# Patient Record
Sex: MALE | Race: WHITE | Employment: OTHER | URBAN - METROPOLITAN AREA
[De-identification: names, ages, dates, MRNs, and addresses within clinical notes are randomized per-mention and may not be internally consistent; named-entity substitution may affect disease eponyms.]

---

## 2017-01-05 ENCOUNTER — TELEPHONE (OUTPATIENT)
Dept: INTERNAL MEDICINE CLINIC | Age: 67
End: 2017-01-05

## 2017-01-30 ENCOUNTER — TELEPHONE (OUTPATIENT)
Dept: INTERNAL MEDICINE CLINIC | Age: 67
End: 2017-01-30

## 2017-01-30 DIAGNOSIS — M31.6 TEMPORAL ARTERITIS (HCC): Primary | ICD-10-CM

## 2017-02-07 ENCOUNTER — TELEPHONE (OUTPATIENT)
Dept: INTERNAL MEDICINE CLINIC | Age: 67
End: 2017-02-07

## 2017-02-07 RX ORDER — PREDNISONE 1 MG/1
TABLET ORAL
Qty: 60 TABLET | Refills: 3 | Status: SHIPPED | OUTPATIENT
Start: 2017-02-07 | End: 2017-03-23 | Stop reason: SDUPTHER

## 2017-02-08 RX ORDER — METHOCARBAMOL 750 MG/1
750 TABLET, FILM COATED ORAL 2 TIMES DAILY PRN
Qty: 30 TABLET | Refills: 1 | Status: SHIPPED | OUTPATIENT
Start: 2017-02-08 | End: 2017-02-18

## 2017-03-02 ENCOUNTER — OFFICE VISIT (OUTPATIENT)
Dept: INTERNAL MEDICINE CLINIC | Age: 67
End: 2017-03-02

## 2017-03-02 VITALS — HEART RATE: 70 BPM | SYSTOLIC BLOOD PRESSURE: 110 MMHG | DIASTOLIC BLOOD PRESSURE: 70 MMHG

## 2017-03-02 DIAGNOSIS — K21.9 GASTROESOPHAGEAL REFLUX DISEASE WITHOUT ESOPHAGITIS: ICD-10-CM

## 2017-03-02 DIAGNOSIS — R73.02 GLUCOSE INTOLERANCE (IMPAIRED GLUCOSE TOLERANCE): ICD-10-CM

## 2017-03-02 DIAGNOSIS — M31.6 TEMPORAL ARTERITIS (HCC): Primary | ICD-10-CM

## 2017-03-02 PROCEDURE — 99213 OFFICE O/P EST LOW 20 MIN: CPT | Performed by: INTERNAL MEDICINE

## 2017-03-02 RX ORDER — IPRATROPIUM BROMIDE 21 UG/1
2 SPRAY, METERED NASAL 2 TIMES DAILY
Qty: 3 BOTTLE | Refills: 3 | Status: SHIPPED | OUTPATIENT
Start: 2017-03-02 | End: 2019-11-14 | Stop reason: SDUPTHER

## 2017-03-20 LAB — SEDIMENTATION RATE, ERYTHROCYTE: 8 MM/HR (ref 0–20)

## 2017-03-23 RX ORDER — PREDNISONE 1 MG/1
TABLET ORAL
Qty: 60 TABLET | Refills: 3
Start: 2017-03-23 | End: 2017-09-25

## 2017-04-03 LAB — SEDIMENTATION RATE, ERYTHROCYTE: 9 MM/HR (ref 0–20)

## 2017-04-06 ENCOUNTER — TELEPHONE (OUTPATIENT)
Dept: INTERNAL MEDICINE CLINIC | Age: 67
End: 2017-04-06

## 2017-04-26 LAB — SEDIMENTATION RATE, ERYTHROCYTE: 13 MM/HR (ref 0–20)

## 2017-05-16 LAB — SEDIMENTATION RATE, ERYTHROCYTE: 10 MM/HR (ref 0–20)

## 2017-06-15 RX ORDER — CYCLOBENZAPRINE HCL 10 MG
TABLET ORAL
Qty: 30 TABLET | Refills: 5 | Status: SHIPPED | OUTPATIENT
Start: 2017-06-15 | End: 2017-09-25

## 2017-09-25 ENCOUNTER — OFFICE VISIT (OUTPATIENT)
Dept: INTERNAL MEDICINE CLINIC | Age: 67
End: 2017-09-25

## 2017-09-25 ENCOUNTER — HOSPITAL ENCOUNTER (OUTPATIENT)
Dept: GENERAL RADIOLOGY | Age: 67
Discharge: HOME | End: 2017-09-25
Attending: INTERNAL MEDICINE | Admitting: INTERNAL MEDICINE

## 2017-09-25 VITALS
SYSTOLIC BLOOD PRESSURE: 130 MMHG | HEART RATE: 76 BPM | HEIGHT: 67 IN | BODY MASS INDEX: 33.09 KG/M2 | DIASTOLIC BLOOD PRESSURE: 70 MMHG | WEIGHT: 210.8 LBS

## 2017-09-25 DIAGNOSIS — M31.6 TEMPORAL ARTERITIS (HCC): Primary | ICD-10-CM

## 2017-09-25 DIAGNOSIS — D50.9 IRON DEFICIENCY ANEMIA, UNSPECIFIED IRON DEFICIENCY ANEMIA TYPE: ICD-10-CM

## 2017-09-25 DIAGNOSIS — G89.29 CHRONIC RIGHT SHOULDER PAIN: ICD-10-CM

## 2017-09-25 DIAGNOSIS — M25.511 CHRONIC RIGHT SHOULDER PAIN: ICD-10-CM

## 2017-09-25 DIAGNOSIS — D12.6 ADENOMATOUS POLYP OF COLON, UNSPECIFIED PART OF COLON: ICD-10-CM

## 2017-09-25 DIAGNOSIS — R73.02 GLUCOSE INTOLERANCE (IMPAIRED GLUCOSE TOLERANCE): ICD-10-CM

## 2017-09-25 DIAGNOSIS — E03.4 HYPOTHYROIDISM DUE TO ACQUIRED ATROPHY OF THYROID: ICD-10-CM

## 2017-09-25 DIAGNOSIS — R00.2 PALPITATIONS: ICD-10-CM

## 2017-09-25 DIAGNOSIS — E55.9 VITAMIN D DEFICIENCY: ICD-10-CM

## 2017-09-25 DIAGNOSIS — E78.2 MIXED HYPERLIPIDEMIA: ICD-10-CM

## 2017-09-25 DIAGNOSIS — M48.061 LUMBAR SPINAL STENOSIS: ICD-10-CM

## 2017-09-25 DIAGNOSIS — K21.9 GASTROESOPHAGEAL REFLUX DISEASE WITHOUT ESOPHAGITIS: ICD-10-CM

## 2017-09-25 DIAGNOSIS — Z23 NEED FOR VACCINATION FOR PNEUMOCOCCUS: ICD-10-CM

## 2017-09-25 DIAGNOSIS — C61 PROSTATE CANCER (HCC): ICD-10-CM

## 2017-09-25 LAB
A/G RATIO: 1.2 (ref 1.1–2.2)
ALBUMIN SERPL-MCNC: 4 G/DL (ref 3.4–5)
ALP BLD-CCNC: 100 U/L (ref 40–129)
ALT SERPL-CCNC: 6 U/L (ref 10–40)
ANION GAP SERPL CALCULATED.3IONS-SCNC: 10 MMOL/L (ref 3–16)
AST SERPL-CCNC: 14 U/L (ref 15–37)
BASOPHILS ABSOLUTE: 0.1 K/UL (ref 0–0.2)
BASOPHILS RELATIVE PERCENT: 1.1 %
BILIRUB SERPL-MCNC: 0.5 MG/DL (ref 0–1)
BILIRUBIN URINE: NEGATIVE
BLOOD, URINE: NEGATIVE
BUN BLDV-MCNC: 15 MG/DL (ref 7–20)
CALCIUM SERPL-MCNC: 9.7 MG/DL (ref 8.3–10.6)
CHLORIDE BLD-SCNC: 103 MMOL/L (ref 99–110)
CHOLESTEROL, TOTAL: 150 MG/DL (ref 0–199)
CLARITY: CLEAR
CO2: 29 MMOL/L (ref 21–32)
COLOR: YELLOW
CREAT SERPL-MCNC: 1 MG/DL (ref 0.8–1.3)
EOSINOPHILS ABSOLUTE: 0.4 K/UL (ref 0–0.6)
EOSINOPHILS RELATIVE PERCENT: 5.7 %
EPITHELIAL CELLS, UA: 0 /HPF (ref 0–5)
GFR AFRICAN AMERICAN: >60
GFR NON-AFRICAN AMERICAN: >60
GLOBULIN: 3.3 G/DL
GLUCOSE BLD-MCNC: 88 MG/DL (ref 70–99)
GLUCOSE URINE: NEGATIVE MG/DL
HCT VFR BLD CALC: 40 % (ref 40.5–52.5)
HDLC SERPL-MCNC: 43 MG/DL (ref 40–60)
HEMOGLOBIN: 13.5 G/DL (ref 13.5–17.5)
HYALINE CASTS: 1 /LPF (ref 0–8)
KETONES, URINE: NEGATIVE MG/DL
LDL CHOLESTEROL CALCULATED: 90 MG/DL
LEUKOCYTE ESTERASE, URINE: NEGATIVE
LYMPHOCYTES ABSOLUTE: 1.8 K/UL (ref 1–5.1)
LYMPHOCYTES RELATIVE PERCENT: 26.2 %
MCH RBC QN AUTO: 30.5 PG (ref 26–34)
MCHC RBC AUTO-ENTMCNC: 33.8 G/DL (ref 31–36)
MCV RBC AUTO: 90.4 FL (ref 80–100)
MICROSCOPIC EXAMINATION: NORMAL
MONOCYTES ABSOLUTE: 0.6 K/UL (ref 0–1.3)
MONOCYTES RELATIVE PERCENT: 8.7 %
NEUTROPHILS ABSOLUTE: 3.9 K/UL (ref 1.7–7.7)
NEUTROPHILS RELATIVE PERCENT: 58.3 %
NITRITE, URINE: NEGATIVE
PDW BLD-RTO: 13.1 % (ref 12.4–15.4)
PH UA: 5.5
PLATELET # BLD: 222 K/UL (ref 135–450)
PMV BLD AUTO: 8.2 FL (ref 5–10.5)
POTASSIUM SERPL-SCNC: 4.3 MMOL/L (ref 3.5–5.1)
PROSTATE SPECIFIC ANTIGEN: 0.01 NG/ML (ref 0–4)
PROTEIN UA: NEGATIVE MG/DL
RBC # BLD: 4.43 M/UL (ref 4.2–5.9)
RBC UA: 2 /HPF (ref 0–4)
SEDIMENTATION RATE, ERYTHROCYTE: 39 MM/HR (ref 0–20)
SODIUM BLD-SCNC: 142 MMOL/L (ref 136–145)
SPECIFIC GRAVITY UA: 1.02
TOTAL PROTEIN: 7.3 G/DL (ref 6.4–8.2)
TRIGL SERPL-MCNC: 83 MG/DL (ref 0–150)
TSH SERPL DL<=0.05 MIU/L-ACNC: 1.71 UIU/ML (ref 0.27–4.2)
UROBILINOGEN, URINE: 0.2 E.U./DL
VITAMIN D 25-HYDROXY: 39.1 NG/ML
VLDLC SERPL CALC-MCNC: 17 MG/DL
WBC # BLD: 6.7 K/UL (ref 4–11)
WBC UA: 0 /HPF (ref 0–5)

## 2017-09-25 PROCEDURE — G0009 ADMIN PNEUMOCOCCAL VACCINE: HCPCS | Performed by: INTERNAL MEDICINE

## 2017-09-25 PROCEDURE — 90670 PCV13 VACCINE IM: CPT | Performed by: INTERNAL MEDICINE

## 2017-09-25 PROCEDURE — 81001 URINALYSIS AUTO W/SCOPE: CPT | Performed by: INTERNAL MEDICINE

## 2017-09-25 PROCEDURE — 36415 COLL VENOUS BLD VENIPUNCTURE: CPT | Performed by: INTERNAL MEDICINE

## 2017-09-25 PROCEDURE — 93000 ELECTROCARDIOGRAM COMPLETE: CPT | Performed by: INTERNAL MEDICINE

## 2017-09-25 PROCEDURE — 99215 OFFICE O/P EST HI 40 MIN: CPT | Performed by: INTERNAL MEDICINE

## 2017-09-25 RX ORDER — VITAMIN B COMPLEX
1 CAPSULE ORAL EVERY MORNING
COMMUNITY

## 2017-09-25 ASSESSMENT — PATIENT HEALTH QUESTIONNAIRE - PHQ9
SUM OF ALL RESPONSES TO PHQ QUESTIONS 1-9: 0
1. LITTLE INTEREST OR PLEASURE IN DOING THINGS: 0
2. FEELING DOWN, DEPRESSED OR HOPELESS: 0
SUM OF ALL RESPONSES TO PHQ9 QUESTIONS 1 & 2: 0

## 2017-09-25 NOTE — MR AVS SNAPSHOT
After Visit Summary             Patti Diego   2017 9:00 AM   Office Visit    Description:  Male : 1950   Provider:  Mariama Cotton MD   Department:  [facility] Internal Medicine              Your Follow-Up and Future Appointments         Below is a list of your follow-up and future appointments. This may not be a complete list as you may have made appointments directly with providers that we are not aware of or your providers may have made some for you. Please call your providers to confirm appointments. It is important to keep your appointments. Please bring your current insurance card, photo ID, co-pay, and all medication bottles to your appointment. If self-pay, payment is expected at the time of service. Your To-Do List     Future Appointments Provider Department Dept Phone    10/9/2017 9:40 AM Mariama Cotton MD [facility] Medicine [phone]    Please arrive 15 minutes prior to appointment, bring photo ID and insurance card. Information from Your Visit        Department     Name Address Phone Fax    [facility] Internal Medicine [facility] [facility] [address] [phone]      You Were Seen for:         Comments    Temporal arteritis [facility])   [[id number]]         Vital Signs     Blood Pressure Pulse Height Weight Body Mass Index Smoking Status    140/74 76 5' 6.75\" (1.695 m) 210 lb 12.8 oz (95.6 kg) 33.26 kg/m2 Never Smoker      Additional Information about your Body Mass Index (BMI)           Your BMI as listed above is considered obese (30 or more). BMI is an estimate of body fat, calculated from your height and weight. The higher your BMI, the greater your risk of heart disease, high blood pressure, type 2 diabetes, stroke, gallstones, arthritis, sleep apnea, and certain cancers. BMI is not perfect. It may overestimate body fat in athletes and people who are more muscular.   Even a small weight loss (between 5 and 10 percent of your current weight) by decreasing your calorie intake and becoming more physically active will help lower your risk of developing or worsening diseases associated with obesity. Learn more at: [employer]          Instructions    Debrox (ear wax softener) one drop to right ear at bedtime for one week            Today's Medication Changes          These changes are accurate as of: 9/25/17  9:40 AM.  If you have any questions, ask your nurse or doctor. STOP taking these medications           albuterol sulfate  (90 Base) MCG/ACT inhaler   Commonly known as:  PROAIR HFA   Stopped by:  Etelvina Hagan MD       cyclobenzaprine 10 MG tablet   Commonly known as:  FLEXERIL   Stopped by:  Etelvina Hagan MD       predniSONE 1 MG tablet   Commonly known as:  Liane Herb by:  Etelvina Hagan MD       predniSONE 10 MG tablet   Commonly known as:  Liane Herb by:  Etelvina Hagan MD       predniSONE 5 MG tablet   Commonly known as:  Liane Herb by:  Etelvina Hagan MD       promethazine-codeine 6.25-10 MG/5ML syrup   Commonly known as:  PHENERGAN with CODEINE   Stopped by:  Etelvina Hagan MD       triamcinolone 55 MCG/ACT nasal inhaler   Commonly known as:  NASACORT AQ   Stopped by:  Etelvina Hagan MD               Your Current Medications Are              b complex vitamins capsule Take 1 capsule by mouth daily    aspirin 81 MG tablet Take 81 mg by mouth daily    ipratropium (ATROVENT) 0.03 % nasal spray 2 sprays by Nasal route 2 times daily    PEPCID 20 MG tablet Take 1 tablet by mouth 2 times daily    Krill Oil 300 MG CAPS Take 1 capsule by mouth daily    Cholecalciferol (VITAMIN D3) 2000 UNITS CAPS Take  by mouth daily. ALLEGRA 180 MG tablet Take 1 tablet by mouth daily.       Allergies              Famotidine     Rash    Fexofenadine     Skin itches/feels prickly    Ranitidine Hcl     Swelling 3. Enter your Rad Access Code exactly as it appears below. You will not need to use this code after youve completed the sign-up process. If you do not sign up before the expiration date, you must request a new code. Rad Access Code: [email or web address]  Expires: 11/24/2017  9:40 AM    4. Enter your Social Security Number ([id number]) and Date of Birth (mm/dd/yyyy) as indicated and click Submit. You will be taken to the next sign-up page. 5. Create a datatrackert ID. This will be your Rad login ID and cannot be changed, so think of one that is secure and easy to remember. 6. Create a Rad password. You can change your password at any time. 7. Enter your Password Reset Question and Answer. This can be used at a later time if you forget your password. 8. Enter your e-mail address. You will receive e-mail notification when new information is available in [address]. 9. Click Sign Up. You can now view your medical record. Additional Information  If you have questions, please contact the physician practice where you receive care. Remember, Rad is NOT to be used for urgent needs. For medical emergencies, dial 911. For questions regarding your Rad account call [phone]. If you have a clinical question, please call your doctor's office.

## 2017-09-25 NOTE — PROGRESS NOTES
mouth daily.  ALLEGRA 180 MG tablet Take 1 tablet by mouth daily. 90 tablet 3     No current facility-administered medications for this visit. ALL:  Zantac angioedema, generic pepcid caused a rash. SH:  No smoking or alcohol history. Has two glasses of tea a day. FH:  Mother  age 80 of pancreatic cancer. Father is 80years old of dementia and prostate cancer. ROS: General: no weight loss or anorexia, no fever or night sweats            Head:       No headache, voice change, hoarseness, or swallowing difficulties            Resp:       No wheezing, coughing, or hemoptysis            CV:           No rest or exertional chest pain. No orthopnea or PND. He has noted palpitations. GI:            No abdominal pain, change in bowel habits, hematochezia, or melanotic stool. :          No frequency, urgency, dysuria, or hematuria. Neuro:     No symptoms of cranial nerve dysfunction, gait difficulties, or extremity weakness. Immun:    Up to date on TD, pneumovax, and influenza. Needs Prevnar & Zoster. PE:    Vitals:      Blood pressure 130/70, pulse 76, height 5' 6.75\" (1.695 m), weight 210 lb 12.8 oz (95.6 kg). GEN:       No acute distress, alert and oriented x 3. HENNT:  TMs and auditory canals are clear. Pupils equal and reactive to light. EOMs intact. Fundi are clear and discs are flat. Oropharynx is clear. No facial rash or lesions. Tenderness over both temporalis muscles. Temporal artery pulsation normal and not tender. NECK:     Tender posterior neck muscles without palpable lymph nodes. Thyroid exam is normal.            LUNGS:   Clear to auscultation. CV:          Regular pulse. Normal S1 & S2. No murmur. No carotid bruits. Axilla:      No palpable nodes. ABD:       Bowel sounds normal. There is no distention.  No abdominal bruits. No tenderness, guarding, rebound, masses, or organomegaly. BECKA:      No hernia or palpable lymphadenopathy. RECT:    Sphincter tone was normal. No masses palpated. Stool nonmelanotic and quaiac negative. EXT:       Bilateral trace edema with varicose veins. Distal pulses are normal. Right shoulder tenderness and crepitus. SKIN:      No abnormal skin lesions were seen. NEURO: Cranial nerves II-XII grossly intact. Gait was normal. Moving all extremities well. IMP:      1. Hyorthyroidism               2. Hyperlipidemia               3. Colonic polyps, adenoma               4. Prostate cancer s/p resection               5. GERD               6. Cervical spine DDD               7. Temporal arteritis                8. Adenomatous colonic polyp               9. Glucose intolerance      PLAN:  1.  Comp chem cbc tsh lipid ua psa vitamin D level sed rate and EKG              2. Hgba1c & sed rate              3. Right shoulder xray              4. Prevnar-13 immunization given               5. Return in two weeks

## 2017-09-26 LAB
ESTIMATED AVERAGE GLUCOSE: 114 MG/DL
HBA1C MFR BLD: 5.6 %

## 2017-10-06 ENCOUNTER — TELEPHONE (OUTPATIENT)
Dept: INTERNAL MEDICINE CLINIC | Age: 67
End: 2017-10-06

## 2017-10-09 ENCOUNTER — OFFICE VISIT (OUTPATIENT)
Dept: INTERNAL MEDICINE CLINIC | Age: 67
End: 2017-10-09

## 2017-10-09 VITALS — HEART RATE: 72 BPM | SYSTOLIC BLOOD PRESSURE: 110 MMHG | DIASTOLIC BLOOD PRESSURE: 70 MMHG

## 2017-10-09 DIAGNOSIS — Z23 NEEDS FLU SHOT: ICD-10-CM

## 2017-10-09 DIAGNOSIS — M19.011 DJD OF RIGHT AC (ACROMIOCLAVICULAR) JOINT: ICD-10-CM

## 2017-10-09 DIAGNOSIS — K21.9 GASTROESOPHAGEAL REFLUX DISEASE WITHOUT ESOPHAGITIS: ICD-10-CM

## 2017-10-09 DIAGNOSIS — R73.02 GLUCOSE INTOLERANCE (IMPAIRED GLUCOSE TOLERANCE): ICD-10-CM

## 2017-10-09 DIAGNOSIS — M31.6 TEMPORAL ARTERITIS (HCC): Primary | ICD-10-CM

## 2017-10-09 LAB — SEDIMENTATION RATE, ERYTHROCYTE: 40 MM/HR (ref 0–20)

## 2017-10-09 PROCEDURE — 99213 OFFICE O/P EST LOW 20 MIN: CPT | Performed by: INTERNAL MEDICINE

## 2017-10-09 PROCEDURE — 36415 COLL VENOUS BLD VENIPUNCTURE: CPT | Performed by: INTERNAL MEDICINE

## 2017-10-09 PROCEDURE — 90662 IIV NO PRSV INCREASED AG IM: CPT | Performed by: INTERNAL MEDICINE

## 2017-10-09 PROCEDURE — G0008 ADMIN INFLUENZA VIRUS VAC: HCPCS | Performed by: INTERNAL MEDICINE

## 2017-10-09 NOTE — PROGRESS NOTES
Vaccine Information Sheet, \"Influenza - Inactivated\"  given to Yuliya Read, or parent/legal guardian of  Yuliya Read and verbalized understanding. Patient responses:    Have you ever had a reaction to a flu vaccine? NO  Are you able to eat eggs without adverse effects? YES  Do you have any current illness? NO  Have you ever had Guillian Smith Syndrome? NO    Flu vaccine given per order. Please see immunization tab.     Dr Sánchez Cleverly ordered for a lab draw to be done-patient easily drawn from the left antecubital space-band-aid applied-tolerated well  1 lav ,tube sent to the lab

## 2017-10-13 ENCOUNTER — TELEPHONE (OUTPATIENT)
Dept: INTERNAL MEDICINE CLINIC | Age: 67
End: 2017-10-13

## 2017-10-13 NOTE — TELEPHONE ENCOUNTER
Patient stated that he is having tightness in the jaw area. He is not sure if this is anything to worry about. And he is having stiffness in feet and ankles mostly when he gets up but it is better after he starts to walk for a few min.

## 2017-10-19 NOTE — PROGRESS NOTES
S: Patient presents for followup on temporal arteritis, GERD, and glucose intolerance. He is currently off his prednisone and without symptoms. No headache, chest pain, or breathing difficulties. No fever, chills, or night sweats. No palpitations, dizziness, or syncope. He has been having right shoulder pain with no trauma. GERD symptoms controlled with no swallowing difficulties. O:Blood pressure 110/70, pulse 72. HEENT: TMs and canals were clear, oral pharynx clear      Neck: No palpable lymph nodes, normal thyroid examination. Lungs: clear      Cardio: reg pulse      Abd: non tender, BS normal, no distention      Ext: right shoulder anterior tenderness and crepitus.         Labs.: from 9/25/17 CBC & UA normal, Sed Rate 39, Hba1c 5.6%, LDL 90 HDL 43 Trig 83, Lytes Renal Liver normal, Glucose 88, PSA 0.01, TSH 1.71, Vit D 39        EKG:  normal         Right Shoulder Xray: moderate right AC joint DJD    A: Temporal arteritis hx off prednisone       Glucose intolerance      GERD      Right AC joint DJD      Prostate cancer s/p prostatectomy     P:  Copy of labs and EKG given       Repeat the sed rate       High Dose Flu immunization given       Compounded antiinflammatory Cream to shoulder tid       OV in 6M with Basic chem Hgba1c lipid liver sed rate cbc       H&P one year

## 2017-11-10 ENCOUNTER — TELEPHONE (OUTPATIENT)
Dept: INTERNAL MEDICINE CLINIC | Age: 67
End: 2017-11-10

## 2017-11-10 DIAGNOSIS — M31.6 TEMPORAL ARTERITIS (HCC): Primary | ICD-10-CM

## 2017-11-10 NOTE — TELEPHONE ENCOUNTER
Pt called in requesting lab orders for a sed rate. Orders pended to you.     Pt will  orders Monday 11/13

## 2017-11-13 LAB — SEDIMENTATION RATE, ERYTHROCYTE: 11 MM/HR (ref 0–20)

## 2017-12-26 RX ORDER — FAMOTIDINE 20 MG
20 TABLET ORAL 2 TIMES DAILY
Qty: 180 TABLET | Refills: 3 | Status: SHIPPED | OUTPATIENT
Start: 2017-12-26 | End: 2018-12-10 | Stop reason: SDUPTHER

## 2018-03-28 DIAGNOSIS — D50.9 IRON DEFICIENCY ANEMIA, UNSPECIFIED IRON DEFICIENCY ANEMIA TYPE: ICD-10-CM

## 2018-03-28 DIAGNOSIS — R73.02 GLUCOSE INTOLERANCE (IMPAIRED GLUCOSE TOLERANCE): Primary | ICD-10-CM

## 2018-03-28 DIAGNOSIS — E78.2 MIXED HYPERLIPIDEMIA: ICD-10-CM

## 2018-03-28 DIAGNOSIS — E03.4 HYPOTHYROIDISM DUE TO ACQUIRED ATROPHY OF THYROID: ICD-10-CM

## 2018-04-02 LAB
A/G RATIO: 1.4 (CALC) (ref 0.8–2.6)
ALBUMIN SERPL-MCNC: 4.3 GM/DL (ref 3.5–5.2)
ALP BLD-CCNC: 105 U/L (ref 23–144)
ALT SERPL-CCNC: 6 U/L (ref 0–60)
AST SERPL-CCNC: 15 U/L (ref 0–55)
BASOPHILS ABSOLUTE: 0.1 K/MM3 (ref 0–0.3)
BASOPHILS RELATIVE PERCENT: 0.8 % (ref 0–2)
BILIRUB SERPL-MCNC: 0.6 MG/DL (ref 0–1.2)
BILIRUBIN DIRECT: 0.1 MG/DL (ref 0–0.4)
BILIRUBIN, INDIRECT: 0.5 MG/DL (CALC) (ref 0–1.2)
BUN / CREAT RATIO: 12 (CALC) (ref 7–25)
BUN BLDV-MCNC: 13 MG/DL (ref 3–29)
CALCIUM SERPL-MCNC: 9.6 MG/DL (ref 8.5–10.5)
CHLORIDE BLD-SCNC: 103 MEQ/L (ref 96–110)
CHOLESTEROL, TOTAL: 162 MG/DL
CO2: 31 MEQ/L (ref 19–32)
CREAT SERPL-MCNC: 1.1 MG/DL
EOSINOPHILS ABSOLUTE: 0.5 K/MM3 (ref 0–0.6)
EOSINOPHILS RELATIVE PERCENT: 6.6 % (ref 0–7)
GFR SERPL CREATININE-BSD FRML MDRD: 68 ML/MIN/1.73M2
GLOBULIN: 3 GM/DL (CALC) (ref 1.9–3.6)
GLUCOSE BLD-MCNC: 87 MG/DL
HBA1C MFR BLD: 5.3 % TOTAL HGB
HCT VFR BLD CALC: 41.5 % (ref 41–50)
HDLC SERPL-MCNC: 47 MG/DL
HEMOGLOBIN: 13.9 G/DL (ref 13.8–17.2)
LDL CHOLESTEROL: 94 MG/DL (CALC)
LEUKOCYTES, UA: 8.2 K/MM3 (ref 3.8–10.8)
LYMPHOCYTES ABSOLUTE: 2.4 K/MM3 (ref 0.9–4.1)
LYMPHOCYTES RELATIVE PERCENT: 29 % (ref 14–51)
MCH RBC QN AUTO: 30.5 PG (ref 27–33)
MCHC RBC AUTO-ENTMCNC: 33.5 G/DL (ref 32–36)
MCV RBC AUTO: 91.1 FL (ref 80–100)
MONOCYTES ABSOLUTE: 0.7 K/MM3 (ref 0.2–1.1)
MONOCYTES RELATIVE PERCENT: 8.7 % (ref 0–14)
NEUTROPHILS ABSOLUTE: 4.5 K/MM3 (ref 1.5–7.8)
PDW BLD-RTO: 13.1 % (ref 9–15)
PLATELET # BLD: 239 K/MM3 (ref 130–400)
POTASSIUM SERPL-SCNC: 4.9 MEQ/L (ref 3.4–5.3)
RBC # BLD: 4.55 M/MM3 (ref 4.4–5.8)
SEDIMENTATION RATE, ERYTHROCYTE: 12 MM/HR (ref 0–20)
SEGMENTED NEUTROPHILS RELATIVE PERCENT: 54.9 % (ref 40–76)
SODIUM BLD-SCNC: 147 MEQ/L (ref 135–148)
TOTAL CK: 75 U/L (ref 0–250)
TOTAL PROTEIN: 7.3 GM/DL (ref 6–8.3)
TRIGL SERPL-MCNC: 105 MG/DL
VLDLC SERPL CALC-MCNC: 21 MG/DL (CALC) (ref 4–38)

## 2018-04-09 ENCOUNTER — OFFICE VISIT (OUTPATIENT)
Dept: INTERNAL MEDICINE CLINIC | Age: 68
End: 2018-04-09

## 2018-04-09 VITALS
HEART RATE: 84 BPM | SYSTOLIC BLOOD PRESSURE: 120 MMHG | BODY MASS INDEX: 33.55 KG/M2 | DIASTOLIC BLOOD PRESSURE: 70 MMHG | WEIGHT: 212.6 LBS

## 2018-04-09 DIAGNOSIS — E78.2 MIXED HYPERLIPIDEMIA: Primary | ICD-10-CM

## 2018-04-09 DIAGNOSIS — K21.9 GASTROESOPHAGEAL REFLUX DISEASE WITHOUT ESOPHAGITIS: ICD-10-CM

## 2018-04-09 DIAGNOSIS — R73.02 GLUCOSE INTOLERANCE (IMPAIRED GLUCOSE TOLERANCE): ICD-10-CM

## 2018-04-09 DIAGNOSIS — Z87.39 HISTORY OF TEMPORAL ARTERITIS: ICD-10-CM

## 2018-04-09 PROCEDURE — 1123F ACP DISCUSS/DSCN MKR DOCD: CPT | Performed by: INTERNAL MEDICINE

## 2018-04-09 PROCEDURE — 4040F PNEUMOC VAC/ADMIN/RCVD: CPT | Performed by: INTERNAL MEDICINE

## 2018-04-09 PROCEDURE — 1036F TOBACCO NON-USER: CPT | Performed by: INTERNAL MEDICINE

## 2018-04-09 PROCEDURE — 3017F COLORECTAL CA SCREEN DOC REV: CPT | Performed by: INTERNAL MEDICINE

## 2018-04-09 PROCEDURE — 99213 OFFICE O/P EST LOW 20 MIN: CPT | Performed by: INTERNAL MEDICINE

## 2018-04-09 PROCEDURE — G8427 DOCREV CUR MEDS BY ELIG CLIN: HCPCS | Performed by: INTERNAL MEDICINE

## 2018-04-09 PROCEDURE — G8417 CALC BMI ABV UP PARAM F/U: HCPCS | Performed by: INTERNAL MEDICINE

## 2018-10-16 ENCOUNTER — OFFICE VISIT (OUTPATIENT)
Dept: INTERNAL MEDICINE CLINIC | Age: 68
End: 2018-10-16
Payer: COMMERCIAL

## 2018-10-16 VITALS
WEIGHT: 213.2 LBS | BODY MASS INDEX: 32.31 KG/M2 | SYSTOLIC BLOOD PRESSURE: 110 MMHG | DIASTOLIC BLOOD PRESSURE: 70 MMHG | HEIGHT: 68 IN | HEART RATE: 68 BPM

## 2018-10-16 DIAGNOSIS — K21.9 GASTROESOPHAGEAL REFLUX DISEASE WITHOUT ESOPHAGITIS: Primary | ICD-10-CM

## 2018-10-16 DIAGNOSIS — R73.09 ELEVATED HEMOGLOBIN A1C: ICD-10-CM

## 2018-10-16 DIAGNOSIS — Z23 NEED FOR PROPHYLACTIC VACCINATION AND INOCULATION AGAINST VARICELLA: ICD-10-CM

## 2018-10-16 DIAGNOSIS — Z23 NEED FOR PROPHYLACTIC VACCINATION AGAINST DIPHTHERIA-TETANUS-PERTUSSIS (DTP): ICD-10-CM

## 2018-10-16 DIAGNOSIS — E03.4 HYPOTHYROIDISM DUE TO ACQUIRED ATROPHY OF THYROID: ICD-10-CM

## 2018-10-16 DIAGNOSIS — E55.9 VITAMIN D DEFICIENCY: ICD-10-CM

## 2018-10-16 DIAGNOSIS — M31.6 TEMPORAL ARTERITIS (HCC): ICD-10-CM

## 2018-10-16 DIAGNOSIS — R00.2 PALPITATIONS: ICD-10-CM

## 2018-10-16 DIAGNOSIS — C61 PROSTATE CANCER (HCC): ICD-10-CM

## 2018-10-16 DIAGNOSIS — Z23 NEEDS FLU SHOT: ICD-10-CM

## 2018-10-16 DIAGNOSIS — E78.2 MIXED HYPERLIPIDEMIA: ICD-10-CM

## 2018-10-16 DIAGNOSIS — D12.3 ADENOMATOUS POLYP OF TRANSVERSE COLON: ICD-10-CM

## 2018-10-16 LAB
A/G RATIO: 2 (ref 1.1–2.2)
ALBUMIN SERPL-MCNC: 4.9 G/DL (ref 3.4–5)
ALP BLD-CCNC: 83 U/L (ref 40–129)
ALT SERPL-CCNC: 8 U/L (ref 10–40)
ANION GAP SERPL CALCULATED.3IONS-SCNC: 15 MMOL/L (ref 3–16)
AST SERPL-CCNC: 18 U/L (ref 15–37)
BASOPHILS ABSOLUTE: 0.1 K/UL (ref 0–0.2)
BASOPHILS RELATIVE PERCENT: 1.2 %
BILIRUB SERPL-MCNC: 0.5 MG/DL (ref 0–1)
BILIRUBIN URINE: NEGATIVE
BLOOD, URINE: NEGATIVE
BUN BLDV-MCNC: 12 MG/DL (ref 7–20)
CALCIUM SERPL-MCNC: 9.6 MG/DL (ref 8.3–10.6)
CHLORIDE BLD-SCNC: 105 MMOL/L (ref 99–110)
CHOLESTEROL, TOTAL: 152 MG/DL (ref 0–199)
CLARITY: CLEAR
CO2: 26 MMOL/L (ref 21–32)
COLOR: YELLOW
CREAT SERPL-MCNC: 1.1 MG/DL (ref 0.8–1.3)
EOSINOPHILS ABSOLUTE: 0.5 K/UL (ref 0–0.6)
EOSINOPHILS RELATIVE PERCENT: 7.5 %
EPITHELIAL CELLS, UA: 0 /HPF (ref 0–5)
GFR AFRICAN AMERICAN: >60
GFR NON-AFRICAN AMERICAN: >60
GLOBULIN: 2.5 G/DL
GLUCOSE BLD-MCNC: 95 MG/DL (ref 70–99)
GLUCOSE URINE: NEGATIVE MG/DL
HCT VFR BLD CALC: 42 % (ref 40.5–52.5)
HDLC SERPL-MCNC: 50 MG/DL (ref 40–60)
HEMOGLOBIN: 14.1 G/DL (ref 13.5–17.5)
HYALINE CASTS: 0 /LPF (ref 0–8)
KETONES, URINE: NEGATIVE MG/DL
LDL CHOLESTEROL CALCULATED: 86 MG/DL
LEUKOCYTE ESTERASE, URINE: NEGATIVE
LYMPHOCYTES ABSOLUTE: 2.1 K/UL (ref 1–5.1)
LYMPHOCYTES RELATIVE PERCENT: 29.9 %
MCH RBC QN AUTO: 31.4 PG (ref 26–34)
MCHC RBC AUTO-ENTMCNC: 33.5 G/DL (ref 31–36)
MCV RBC AUTO: 93.7 FL (ref 80–100)
MICROSCOPIC EXAMINATION: ABNORMAL
MONOCYTES ABSOLUTE: 0.6 K/UL (ref 0–1.3)
MONOCYTES RELATIVE PERCENT: 7.8 %
NEUTROPHILS ABSOLUTE: 3.8 K/UL (ref 1.7–7.7)
NEUTROPHILS RELATIVE PERCENT: 53.6 %
NITRITE, URINE: NEGATIVE
PDW BLD-RTO: 13 % (ref 12.4–15.4)
PH UA: 5.5
PLATELET # BLD: 223 K/UL (ref 135–450)
PMV BLD AUTO: 8.3 FL (ref 5–10.5)
POTASSIUM SERPL-SCNC: 4.4 MMOL/L (ref 3.5–5.1)
PROSTATE SPECIFIC ANTIGEN: 0.01 NG/ML (ref 0–4)
PROTEIN UA: NEGATIVE MG/DL
RBC # BLD: 4.48 M/UL (ref 4.2–5.9)
RBC UA: 5 /HPF (ref 0–4)
SEDIMENTATION RATE, ERYTHROCYTE: 23 MM/HR (ref 0–20)
SODIUM BLD-SCNC: 146 MMOL/L (ref 136–145)
SPECIFIC GRAVITY UA: 1.02
TOTAL PROTEIN: 7.4 G/DL (ref 6.4–8.2)
TRIGL SERPL-MCNC: 80 MG/DL (ref 0–150)
TSH SERPL DL<=0.05 MIU/L-ACNC: 2.75 UIU/ML (ref 0.27–4.2)
UROBILINOGEN, URINE: 0.2 E.U./DL
VITAMIN D 25-HYDROXY: 41.8 NG/ML
VLDLC SERPL CALC-MCNC: 16 MG/DL
WBC # BLD: 7.1 K/UL (ref 4–11)
WBC UA: 1 /HPF (ref 0–5)

## 2018-10-16 PROCEDURE — 4040F PNEUMOC VAC/ADMIN/RCVD: CPT | Performed by: INTERNAL MEDICINE

## 2018-10-16 PROCEDURE — 90662 IIV NO PRSV INCREASED AG IM: CPT | Performed by: INTERNAL MEDICINE

## 2018-10-16 PROCEDURE — 81001 URINALYSIS AUTO W/SCOPE: CPT | Performed by: INTERNAL MEDICINE

## 2018-10-16 PROCEDURE — 1123F ACP DISCUSS/DSCN MKR DOCD: CPT | Performed by: INTERNAL MEDICINE

## 2018-10-16 PROCEDURE — G8482 FLU IMMUNIZE ORDER/ADMIN: HCPCS | Performed by: INTERNAL MEDICINE

## 2018-10-16 PROCEDURE — G8417 CALC BMI ABV UP PARAM F/U: HCPCS | Performed by: INTERNAL MEDICINE

## 2018-10-16 PROCEDURE — 99215 OFFICE O/P EST HI 40 MIN: CPT | Performed by: INTERNAL MEDICINE

## 2018-10-16 PROCEDURE — 3017F COLORECTAL CA SCREEN DOC REV: CPT | Performed by: INTERNAL MEDICINE

## 2018-10-16 PROCEDURE — 1101F PT FALLS ASSESS-DOCD LE1/YR: CPT | Performed by: INTERNAL MEDICINE

## 2018-10-16 PROCEDURE — 1036F TOBACCO NON-USER: CPT | Performed by: INTERNAL MEDICINE

## 2018-10-16 PROCEDURE — 36415 COLL VENOUS BLD VENIPUNCTURE: CPT | Performed by: INTERNAL MEDICINE

## 2018-10-16 PROCEDURE — 93000 ELECTROCARDIOGRAM COMPLETE: CPT | Performed by: INTERNAL MEDICINE

## 2018-10-16 PROCEDURE — 90471 IMMUNIZATION ADMIN: CPT | Performed by: INTERNAL MEDICINE

## 2018-10-16 PROCEDURE — G8427 DOCREV CUR MEDS BY ELIG CLIN: HCPCS | Performed by: INTERNAL MEDICINE

## 2018-10-16 ASSESSMENT — PATIENT HEALTH QUESTIONNAIRE - PHQ9
SUM OF ALL RESPONSES TO PHQ9 QUESTIONS 1 & 2: 0
2. FEELING DOWN, DEPRESSED OR HOPELESS: 0
SUM OF ALL RESPONSES TO PHQ QUESTIONS 1-9: 0
1. LITTLE INTEREST OR PLEASURE IN DOING THINGS: 0
SUM OF ALL RESPONSES TO PHQ QUESTIONS 1-9: 0

## 2018-10-16 NOTE — PROGRESS NOTES
Vaccine Information Sheet, \"Influenza - Inactivated\"  given to Lisa Needles, or parent/legal guardian of  Lisa Needles and verbalized understanding. Patient responses:    Have you ever had a reaction to a flu vaccine? NO  Are you able to eat eggs without adverse effects? YES  Do you have any current illness? NO  Have you ever had Guillian Rescue Syndrome? NO    Flu vaccine given per order. Please see immunization tab.

## 2018-10-16 NOTE — PROGRESS NOTES
Comprehensive Exam      PI:        The patient is a 77year old white male who presents with a problem of chronic allergic rhinitis that is controlled with Allegra 180 mg qday and Atrovent nasal spray. Eye exams with Dr Cecilio Reeves yearly have been normal.                There is a problem of prostate cancer treated by prostatectomy by Dr Gita Mohan in 8/03. PSAs since then has been <0.1. No symptoms of urinary incontinence and he is doing his Kegel exercises. He has a problem of colonic polyps that are adenomatous. Last colonoscopy was is 8/2013 by Dr Bridget Bratholomew where one adenomatous polyp was removed. A five year followup was recommended. No abdominal pain, change in bowel habits, hematochezia, or melanotic stools. His GERD is controlled with Pepcid and avoidance methods. . No swallowing difficulties or voice change. There was a problem of temporal arteritis on prednisone. His prednisone dose has been tapered with no return of symptoms. No fever, chills, or night sweats. No visual disturbance or headache. PMH:    History of temporal arteritis, GERD, colonic polyps, prostate cancer, lumbar spinal stenosis, and allergic rhinitis. No history of HTN, diabetes, cardiac, or pulmonary disorder. Surgical history of prostate resection, left rotator cuff repair, right inguinal hernia repair, right maxillary sinus surgery, and right testicular release. MED:   Current Outpatient Prescriptions   Medication Sig Dispense Refill    PEPCID 20 MG tablet TAKE 1 TABLET BY MOUTH 2 TIMES DAILY 180 tablet 3    b complex vitamins capsule Take 1 capsule by mouth daily      aspirin 81 MG tablet Take 81 mg by mouth daily      ipratropium (ATROVENT) 0.03 % nasal spray 2 sprays by Nasal route 2 times daily 3 Bottle 3    Krill Oil 300 MG CAPS Take 1 capsule by mouth daily      Cholecalciferol (VITAMIN D3) 2000 UNITS CAPS Take  by mouth daily.  ALLEGRA 180 MG tablet Take 1 tablet by mouth daily.  80

## 2018-10-17 LAB
ESTIMATED AVERAGE GLUCOSE: 105.4 MG/DL
HBA1C MFR BLD: 5.3 %

## 2018-10-30 ENCOUNTER — OFFICE VISIT (OUTPATIENT)
Dept: INTERNAL MEDICINE CLINIC | Age: 68
End: 2018-10-30
Payer: COMMERCIAL

## 2018-10-30 VITALS — HEART RATE: 64 BPM | SYSTOLIC BLOOD PRESSURE: 120 MMHG | DIASTOLIC BLOOD PRESSURE: 70 MMHG

## 2018-10-30 DIAGNOSIS — K21.9 GASTROESOPHAGEAL REFLUX DISEASE WITHOUT ESOPHAGITIS: Primary | ICD-10-CM

## 2018-10-30 DIAGNOSIS — C61 PROSTATE CANCER (HCC): ICD-10-CM

## 2018-10-30 DIAGNOSIS — E78.2 MIXED HYPERLIPIDEMIA: ICD-10-CM

## 2018-10-30 PROCEDURE — G8417 CALC BMI ABV UP PARAM F/U: HCPCS | Performed by: INTERNAL MEDICINE

## 2018-10-30 PROCEDURE — 99213 OFFICE O/P EST LOW 20 MIN: CPT | Performed by: INTERNAL MEDICINE

## 2018-10-30 PROCEDURE — G8482 FLU IMMUNIZE ORDER/ADMIN: HCPCS | Performed by: INTERNAL MEDICINE

## 2018-10-30 PROCEDURE — G8427 DOCREV CUR MEDS BY ELIG CLIN: HCPCS | Performed by: INTERNAL MEDICINE

## 2018-10-30 PROCEDURE — 1101F PT FALLS ASSESS-DOCD LE1/YR: CPT | Performed by: INTERNAL MEDICINE

## 2018-10-30 PROCEDURE — 4040F PNEUMOC VAC/ADMIN/RCVD: CPT | Performed by: INTERNAL MEDICINE

## 2018-10-30 PROCEDURE — 1036F TOBACCO NON-USER: CPT | Performed by: INTERNAL MEDICINE

## 2018-10-30 PROCEDURE — 3017F COLORECTAL CA SCREEN DOC REV: CPT | Performed by: INTERNAL MEDICINE

## 2018-10-30 PROCEDURE — 1123F ACP DISCUSS/DSCN MKR DOCD: CPT | Performed by: INTERNAL MEDICINE

## 2018-11-04 NOTE — PROGRESS NOTES
S: Patient presents for followup on temporal arteritis, GERD, Prostate cancer, hyperlipidemia, and glucose intolerance. He has been treated for temporal arteritis with prednisone. Since tapering off his prednisone he has been doing well with no fever, chills, or night sweats. No headache, chest pain, or breathing difficulties. GERD symptoms are controlled with pepcid and avoidance. No swallowing difficulties. O:Blood pressure 120/70, pulse 64. HEENT: TMs and canals were clear, oral pharynx clear      Neck: No palpable lymph nodes, normal thyroid examination. Lungs: clear      Cardio: reg pulse      Abd: non tender, BS normal, no distention      Ext: no edema         Labs.: from 10/16/18 CBC & UA normal, LDL 86 HDL 50 Trig 80, Vit D 42, Sed Rate 23, Hgba1c 5.3%, TSH 2.75, PSA 0.01, Lytes Renal Liver normal, Glucose 95   A: Temporal arteritis hx, off prednisone with normal sed rate        EKG: sinus bradycardia    A: Glucose intolerance controlled       GERD controlled      Hyperlipidemia controlled      Prostate Cancer history with PSA of 0.01      Temporal arteritis history with sed rate 23       BMI 33     P:  Copy of labs & EKG given       Continue present medications and diet.        Daily walking        Discussed need to reduce BMI <30       H&P in one year

## 2018-11-09 ENCOUNTER — ANESTHESIA EVENT (OUTPATIENT)
Dept: OPERATING ROOM | Age: 68
End: 2018-11-09
Payer: MEDICARE

## 2018-11-09 NOTE — ANESTHESIA PRE PROCEDURE
from Last 3 Encounters:   10/16/18 213 lb 3.2 oz (96.7 kg)   04/09/18 212 lb 9.6 oz (96.4 kg)   09/25/17 210 lb 12.8 oz (95.6 kg)     There is no height or weight on file to calculate BMI.    CBC:   Lab Results   Component Value Date    WBC 7.1 10/16/2018    RBC 4.48 10/16/2018    HGB 14.1 10/16/2018    HCT 42.0 10/16/2018    MCV 93.7 10/16/2018    RDW 13.0 10/16/2018     10/16/2018       CMP:   Lab Results   Component Value Date     10/16/2018    K 4.4 10/16/2018     10/16/2018    CO2 26 10/16/2018    BUN 12 10/16/2018    CREATININE 1.1 10/16/2018    GFRAA >60 10/16/2018    GFRAA >60 04/01/2013    AGRATIO 2.0 10/16/2018    LABGLOM >60 10/16/2018    LABGLOM 68 04/02/2018    GLUCOSE 95 10/16/2018    PROT 7.4 10/16/2018    PROT 6.7 01/27/2012    CALCIUM 9.6 10/16/2018    BILITOT 0.5 10/16/2018    ALKPHOS 83 10/16/2018    AST 18 10/16/2018    ALT 8 10/16/2018       POC Tests: No results for input(s): POCGLU, POCNA, POCK, POCCL, POCBUN, POCHEMO, POCHCT in the last 72 hours. Coags: No results found for: PROTIME, INR, APTT    HCG (If Applicable): No results found for: PREGTESTUR, PREGSERUM, HCG, HCGQUANT     ABGs: No results found for: PHART, PO2ART, PKU4GSF, UCW1YIW, BEART, H7NHPQKD     Type & Screen (If Applicable):  No results found for: LABABO, [address]    Anesthesia Evaluation  Patient summary reviewed   history of anesthetic complications: PONV. Airway: Mallampati: II  TM distance: >3 FB   Neck ROM: full  Mouth opening: > = 3 FB Dental: normal exam         Pulmonary:normal exam                               Cardiovascular:  Exercise tolerance: good (>4 METS),            Beta Blocker:  Not on Beta Blocker         Neuro/Psych:               GI/Hepatic/Renal:   (+) GERD:, bowel prep, morbid obesity          Endo/Other:    (+) malignancy/cancer (prostate cancer).                  Abdominal:   (+) obese,         Vascular:                                      Anesthesia Plan      MAC     ASA 3

## 2018-11-14 ENCOUNTER — ANESTHESIA (OUTPATIENT)
Dept: OPERATING ROOM | Age: 68
End: 2018-11-14
Payer: MEDICARE

## 2018-11-14 ENCOUNTER — HOSPITAL ENCOUNTER (OUTPATIENT)
Age: 68
Setting detail: OUTPATIENT SURGERY
Discharge: HOME | End: 2018-11-14
Attending: SPECIALIST | Admitting: SPECIALIST
Payer: MEDICARE

## 2018-11-14 VITALS
HEIGHT: 67 IN | BODY MASS INDEX: 32.8 KG/M2 | SYSTOLIC BLOOD PRESSURE: 111 MMHG | WEIGHT: 209 LBS | HEART RATE: 74 BPM | DIASTOLIC BLOOD PRESSURE: 81 MMHG | TEMPERATURE: 97.6 F | RESPIRATION RATE: 16 BRPM | OXYGEN SATURATION: 96 %

## 2018-11-14 VITALS
OXYGEN SATURATION: 96 % | TEMPERATURE: 98.6 F | DIASTOLIC BLOOD PRESSURE: 65 MMHG | SYSTOLIC BLOOD PRESSURE: 97 MMHG | RESPIRATION RATE: 12 BRPM

## 2018-11-14 PROCEDURE — 2500000003 HC RX 250 WO HCPCS: Performed by: SPECIALIST

## 2018-11-14 PROCEDURE — 7100000010 HC PHASE II RECOVERY - FIRST 15 MIN: Performed by: SPECIALIST

## 2018-11-14 PROCEDURE — 2580000003 HC RX 258: Performed by: SPECIALIST

## 2018-11-14 PROCEDURE — 2709999900 HC NON-CHARGEABLE SUPPLY: Performed by: SPECIALIST

## 2018-11-14 PROCEDURE — 88305 TISSUE EXAM BY PATHOLOGIST: CPT

## 2018-11-14 PROCEDURE — 7100000011 HC PHASE II RECOVERY - ADDTL 15 MIN: Performed by: SPECIALIST

## 2018-11-14 PROCEDURE — 2720000010 HC SURG SUPPLY STERILE: Performed by: SPECIALIST

## 2018-11-14 PROCEDURE — 3700000000 HC ANESTHESIA ATTENDED CARE: Performed by: SPECIALIST

## 2018-11-14 PROCEDURE — 3609009900 HC COLONOSCOPY W/CONTROL BLEEDING ANY METHOD: Performed by: SPECIALIST

## 2018-11-14 PROCEDURE — 6360000002 HC RX W HCPCS: Performed by: NURSE ANESTHETIST, CERTIFIED REGISTERED

## 2018-11-14 PROCEDURE — 2500000003 HC RX 250 WO HCPCS: Performed by: NURSE ANESTHETIST, CERTIFIED REGISTERED

## 2018-11-14 PROCEDURE — 3700000001 HC ADD 15 MINUTES (ANESTHESIA): Performed by: SPECIALIST

## 2018-11-14 RX ORDER — FENTANYL CITRATE 50 UG/ML
INJECTION, SOLUTION INTRAMUSCULAR; INTRAVENOUS PRN
Status: DISCONTINUED | OUTPATIENT
Start: 2018-11-14 | End: 2018-11-14 | Stop reason: SDUPTHER

## 2018-11-14 RX ORDER — SODIUM CHLORIDE, SODIUM LACTATE, POTASSIUM CHLORIDE, CALCIUM CHLORIDE 600; 310; 30; 20 MG/100ML; MG/100ML; MG/100ML; MG/100ML
INJECTION, SOLUTION INTRAVENOUS CONTINUOUS
Status: DISCONTINUED | OUTPATIENT
Start: 2018-11-14 | End: 2018-11-14 | Stop reason: HOSPADM

## 2018-11-14 RX ORDER — PROPOFOL 10 MG/ML
INJECTION, EMULSION INTRAVENOUS PRN
Status: DISCONTINUED | OUTPATIENT
Start: 2018-11-14 | End: 2018-11-14 | Stop reason: SDUPTHER

## 2018-11-14 RX ORDER — LIDOCAINE HYDROCHLORIDE 20 MG/ML
INJECTION, SOLUTION EPIDURAL; INFILTRATION; INTRACAUDAL; PERINEURAL PRN
Status: DISCONTINUED | OUTPATIENT
Start: 2018-11-14 | End: 2018-11-14 | Stop reason: SDUPTHER

## 2018-11-14 RX ADMIN — FENTANYL CITRATE 25 MCG: 50 INJECTION INTRAMUSCULAR; INTRAVENOUS at 13:14

## 2018-11-14 RX ADMIN — FENTANYL CITRATE 50 MCG: 50 INJECTION INTRAMUSCULAR; INTRAVENOUS at 13:09

## 2018-11-14 RX ADMIN — PROPOFOL 200 MG: 10 INJECTION, EMULSION INTRAVENOUS at 12:55

## 2018-11-14 RX ADMIN — SODIUM CHLORIDE, POTASSIUM CHLORIDE, SODIUM LACTATE AND CALCIUM CHLORIDE: 600; 310; 30; 20 INJECTION, SOLUTION INTRAVENOUS at 12:46

## 2018-11-14 RX ADMIN — LIDOCAINE HYDROCHLORIDE 100 MG: 20 INJECTION, SOLUTION EPIDURAL; INFILTRATION; INTRACAUDAL; PERINEURAL at 12:50

## 2018-11-14 RX ADMIN — FENTANYL CITRATE 25 MCG: 50 INJECTION INTRAMUSCULAR; INTRAVENOUS at 13:11

## 2018-11-14 RX ADMIN — SODIUM CHLORIDE, POTASSIUM CHLORIDE, SODIUM LACTATE AND CALCIUM CHLORIDE: 600; 310; 30; 20 INJECTION, SOLUTION INTRAVENOUS at 10:24

## 2018-11-14 RX ADMIN — PROPOFOL 60 MG: 10 INJECTION, EMULSION INTRAVENOUS at 12:50

## 2018-11-14 ASSESSMENT — PAIN - FUNCTIONAL ASSESSMENT: PAIN_FUNCTIONAL_ASSESSMENT: 0-10

## 2018-11-14 ASSESSMENT — PAIN SCALES - GENERAL: PAINLEVEL_OUTOF10: 0

## 2018-12-10 RX ORDER — FAMOTIDINE 20 MG
20 TABLET ORAL 2 TIMES DAILY
Qty: 180 TABLET | Refills: 3 | Status: SHIPPED | OUTPATIENT
Start: 2018-12-10 | End: 2019-10-17 | Stop reason: SDUPTHER

## 2019-08-07 ENCOUNTER — TELEPHONE (OUTPATIENT)
Dept: INTERNAL MEDICINE CLINIC | Age: 69
End: 2019-08-07

## 2019-08-07 RX ORDER — CEFUROXIME AXETIL 250 MG/1
250 TABLET ORAL 2 TIMES DAILY
Qty: 20 TABLET | Refills: 0 | Status: SHIPPED | OUTPATIENT
Start: 2019-08-07 | End: 2019-08-17

## 2019-10-17 RX ORDER — FAMOTIDINE 20 MG
20 TABLET ORAL 2 TIMES DAILY
Qty: 180 TABLET | Refills: 3 | Status: SHIPPED | OUTPATIENT
Start: 2019-10-17 | End: 2020-10-22

## 2019-11-04 ENCOUNTER — OFFICE VISIT (OUTPATIENT)
Dept: INTERNAL MEDICINE CLINIC | Age: 69
End: 2019-11-04
Payer: MEDICARE

## 2019-11-04 VITALS
WEIGHT: 209.4 LBS | HEIGHT: 68 IN | SYSTOLIC BLOOD PRESSURE: 125 MMHG | DIASTOLIC BLOOD PRESSURE: 76 MMHG | BODY MASS INDEX: 31.74 KG/M2 | HEART RATE: 62 BPM

## 2019-11-04 DIAGNOSIS — M48.061 SPINAL STENOSIS OF LUMBAR REGION WITHOUT NEUROGENIC CLAUDICATION: ICD-10-CM

## 2019-11-04 DIAGNOSIS — C61 PROSTATE CANCER (HCC): ICD-10-CM

## 2019-11-04 DIAGNOSIS — E78.2 MIXED HYPERLIPIDEMIA: ICD-10-CM

## 2019-11-04 DIAGNOSIS — D12.6 ADENOMATOUS POLYP OF COLON, UNSPECIFIED PART OF COLON: ICD-10-CM

## 2019-11-04 DIAGNOSIS — Z23 NEED FOR INFLUENZA VACCINATION: ICD-10-CM

## 2019-11-04 DIAGNOSIS — R00.2 PALPITATIONS: ICD-10-CM

## 2019-11-04 DIAGNOSIS — E55.9 VITAMIN D DEFICIENCY: ICD-10-CM

## 2019-11-04 DIAGNOSIS — R73.09 ELEVATED HEMOGLOBIN A1C: ICD-10-CM

## 2019-11-04 DIAGNOSIS — K21.9 GASTROESOPHAGEAL REFLUX DISEASE WITHOUT ESOPHAGITIS: ICD-10-CM

## 2019-11-04 DIAGNOSIS — M19.90 ARTHRITIS: Primary | ICD-10-CM

## 2019-11-04 DIAGNOSIS — D50.9 IRON DEFICIENCY ANEMIA, UNSPECIFIED IRON DEFICIENCY ANEMIA TYPE: ICD-10-CM

## 2019-11-04 PROCEDURE — G8417 CALC BMI ABV UP PARAM F/U: HCPCS | Performed by: INTERNAL MEDICINE

## 2019-11-04 PROCEDURE — 1123F ACP DISCUSS/DSCN MKR DOCD: CPT | Performed by: INTERNAL MEDICINE

## 2019-11-04 PROCEDURE — G8427 DOCREV CUR MEDS BY ELIG CLIN: HCPCS | Performed by: INTERNAL MEDICINE

## 2019-11-04 PROCEDURE — 93000 ELECTROCARDIOGRAM COMPLETE: CPT | Performed by: INTERNAL MEDICINE

## 2019-11-04 PROCEDURE — 99215 OFFICE O/P EST HI 40 MIN: CPT | Performed by: INTERNAL MEDICINE

## 2019-11-04 PROCEDURE — 4040F PNEUMOC VAC/ADMIN/RCVD: CPT | Performed by: INTERNAL MEDICINE

## 2019-11-04 PROCEDURE — 1036F TOBACCO NON-USER: CPT | Performed by: INTERNAL MEDICINE

## 2019-11-04 PROCEDURE — G8482 FLU IMMUNIZE ORDER/ADMIN: HCPCS | Performed by: INTERNAL MEDICINE

## 2019-11-04 PROCEDURE — 90653 IIV ADJUVANT VACCINE IM: CPT | Performed by: INTERNAL MEDICINE

## 2019-11-04 PROCEDURE — G0008 ADMIN INFLUENZA VIRUS VAC: HCPCS | Performed by: INTERNAL MEDICINE

## 2019-11-04 PROCEDURE — 3017F COLORECTAL CA SCREEN DOC REV: CPT | Performed by: INTERNAL MEDICINE

## 2019-11-04 ASSESSMENT — PATIENT HEALTH QUESTIONNAIRE - PHQ9
SUM OF ALL RESPONSES TO PHQ QUESTIONS 1-9: 0
2. FEELING DOWN, DEPRESSED OR HOPELESS: 0
SUM OF ALL RESPONSES TO PHQ QUESTIONS 1-9: 0
1. LITTLE INTEREST OR PLEASURE IN DOING THINGS: 0
SUM OF ALL RESPONSES TO PHQ9 QUESTIONS 1 & 2: 0

## 2019-11-06 DIAGNOSIS — K21.9 GASTROESOPHAGEAL REFLUX DISEASE WITHOUT ESOPHAGITIS: ICD-10-CM

## 2019-11-06 DIAGNOSIS — C61 PROSTATE CANCER (HCC): ICD-10-CM

## 2019-11-06 DIAGNOSIS — R00.2 PALPITATIONS: ICD-10-CM

## 2019-11-06 DIAGNOSIS — D50.9 IRON DEFICIENCY ANEMIA, UNSPECIFIED IRON DEFICIENCY ANEMIA TYPE: ICD-10-CM

## 2019-11-06 DIAGNOSIS — E78.2 MIXED HYPERLIPIDEMIA: ICD-10-CM

## 2019-11-06 DIAGNOSIS — E55.9 VITAMIN D DEFICIENCY: ICD-10-CM

## 2019-11-06 DIAGNOSIS — R73.09 ELEVATED HEMOGLOBIN A1C: ICD-10-CM

## 2019-11-06 DIAGNOSIS — M19.90 ARTHRITIS: ICD-10-CM

## 2019-11-06 LAB
A/G RATIO: 2 (ref 1.1–2.2)
ALBUMIN SERPL-MCNC: 4.5 G/DL (ref 3.4–5)
ALP BLD-CCNC: 74 U/L (ref 40–129)
ALT SERPL-CCNC: <5 U/L (ref 10–40)
ANION GAP SERPL CALCULATED.3IONS-SCNC: 13 MMOL/L (ref 3–16)
AST SERPL-CCNC: 13 U/L (ref 15–37)
BASOPHILS ABSOLUTE: 0.1 K/UL (ref 0–0.2)
BASOPHILS RELATIVE PERCENT: 1.1 %
BILIRUB SERPL-MCNC: 0.5 MG/DL (ref 0–1)
BILIRUBIN URINE: NEGATIVE
BLOOD, URINE: NEGATIVE
BUN BLDV-MCNC: 18 MG/DL (ref 7–20)
CALCIUM SERPL-MCNC: 9.4 MG/DL (ref 8.3–10.6)
CHLORIDE BLD-SCNC: 102 MMOL/L (ref 99–110)
CHOLESTEROL, TOTAL: 152 MG/DL (ref 0–199)
CLARITY: CLEAR
CO2: 25 MMOL/L (ref 21–32)
COLOR: YELLOW
CREAT SERPL-MCNC: 1.1 MG/DL (ref 0.8–1.3)
EOSINOPHILS ABSOLUTE: 0.5 K/UL (ref 0–0.6)
EOSINOPHILS RELATIVE PERCENT: 7.3 %
EPITHELIAL CELLS, UA: 0 /HPF (ref 0–5)
GFR AFRICAN AMERICAN: >60
GFR NON-AFRICAN AMERICAN: >60
GLOBULIN: 2.2 G/DL
GLUCOSE BLD-MCNC: 92 MG/DL (ref 70–99)
GLUCOSE URINE: NEGATIVE MG/DL
HCT VFR BLD CALC: 40 % (ref 40.5–52.5)
HDLC SERPL-MCNC: 51 MG/DL (ref 40–60)
HEMOGLOBIN: 13.8 G/DL (ref 13.5–17.5)
HYALINE CASTS: 0 /LPF (ref 0–8)
KETONES, URINE: NEGATIVE MG/DL
LDL CHOLESTEROL CALCULATED: 85 MG/DL
LEUKOCYTE ESTERASE, URINE: NEGATIVE
LYMPHOCYTES ABSOLUTE: 2.1 K/UL (ref 1–5.1)
LYMPHOCYTES RELATIVE PERCENT: 31.7 %
MCH RBC QN AUTO: 32 PG (ref 26–34)
MCHC RBC AUTO-ENTMCNC: 34.5 G/DL (ref 31–36)
MCV RBC AUTO: 92.8 FL (ref 80–100)
MICROSCOPIC EXAMINATION: NORMAL
MONOCYTES ABSOLUTE: 0.6 K/UL (ref 0–1.3)
MONOCYTES RELATIVE PERCENT: 8.9 %
NEUTROPHILS ABSOLUTE: 3.4 K/UL (ref 1.7–7.7)
NEUTROPHILS RELATIVE PERCENT: 51 %
NITRITE, URINE: NEGATIVE
PDW BLD-RTO: 12.9 % (ref 12.4–15.4)
PH UA: 6 (ref 5–8)
PLATELET # BLD: 190 K/UL (ref 135–450)
PMV BLD AUTO: 8.2 FL (ref 5–10.5)
POTASSIUM SERPL-SCNC: 4.6 MMOL/L (ref 3.5–5.1)
PROSTATE SPECIFIC ANTIGEN: <0.01 NG/ML (ref 0–4)
PROTEIN UA: NEGATIVE MG/DL
RBC # BLD: 4.31 M/UL (ref 4.2–5.9)
RBC UA: 2 /HPF (ref 0–4)
RHEUMATOID FACTOR: <10 IU/ML
SEDIMENTATION RATE, ERYTHROCYTE: 15 MM/HR (ref 0–20)
SODIUM BLD-SCNC: 140 MMOL/L (ref 136–145)
SPECIFIC GRAVITY UA: 1.01 (ref 1–1.03)
TOTAL PROTEIN: 6.7 G/DL (ref 6.4–8.2)
TRIGL SERPL-MCNC: 80 MG/DL (ref 0–150)
TSH SERPL DL<=0.05 MIU/L-ACNC: 2.07 UIU/ML (ref 0.27–4.2)
URIC ACID, SERUM: 5.6 MG/DL (ref 3.5–7.2)
URINE TYPE: NORMAL
UROBILINOGEN, URINE: 0.2 E.U./DL
VITAMIN D 25-HYDROXY: 40.8 NG/ML
VLDLC SERPL CALC-MCNC: 16 MG/DL
WBC # BLD: 6.6 K/UL (ref 4–11)
WBC UA: 0 /HPF (ref 0–5)

## 2019-11-07 LAB
ESTIMATED AVERAGE GLUCOSE: 102.5 MG/DL
HBA1C MFR BLD: 5.2 %

## 2019-11-14 ENCOUNTER — OFFICE VISIT (OUTPATIENT)
Dept: INTERNAL MEDICINE CLINIC | Age: 69
End: 2019-11-14
Payer: MEDICARE

## 2019-11-14 VITALS — HEART RATE: 62 BPM | DIASTOLIC BLOOD PRESSURE: 70 MMHG | SYSTOLIC BLOOD PRESSURE: 114 MMHG

## 2019-11-14 DIAGNOSIS — E74.39 GLUCOSE INTOLERANCE: ICD-10-CM

## 2019-11-14 DIAGNOSIS — K21.9 GASTROESOPHAGEAL REFLUX DISEASE WITHOUT ESOPHAGITIS: Primary | ICD-10-CM

## 2019-11-14 DIAGNOSIS — E78.2 MIXED HYPERLIPIDEMIA: ICD-10-CM

## 2019-11-14 DIAGNOSIS — R09.81 SINUS CONGESTION: ICD-10-CM

## 2019-11-14 PROCEDURE — G8427 DOCREV CUR MEDS BY ELIG CLIN: HCPCS | Performed by: INTERNAL MEDICINE

## 2019-11-14 PROCEDURE — 1123F ACP DISCUSS/DSCN MKR DOCD: CPT | Performed by: INTERNAL MEDICINE

## 2019-11-14 PROCEDURE — 4040F PNEUMOC VAC/ADMIN/RCVD: CPT | Performed by: INTERNAL MEDICINE

## 2019-11-14 PROCEDURE — 1036F TOBACCO NON-USER: CPT | Performed by: INTERNAL MEDICINE

## 2019-11-14 PROCEDURE — G8482 FLU IMMUNIZE ORDER/ADMIN: HCPCS | Performed by: INTERNAL MEDICINE

## 2019-11-14 PROCEDURE — 99213 OFFICE O/P EST LOW 20 MIN: CPT | Performed by: INTERNAL MEDICINE

## 2019-11-14 PROCEDURE — 3017F COLORECTAL CA SCREEN DOC REV: CPT | Performed by: INTERNAL MEDICINE

## 2019-11-14 PROCEDURE — G8417 CALC BMI ABV UP PARAM F/U: HCPCS | Performed by: INTERNAL MEDICINE

## 2019-11-14 RX ORDER — IPRATROPIUM BROMIDE 21 UG/1
2 SPRAY, METERED NASAL 2 TIMES DAILY
Qty: 3 BOTTLE | Refills: 3 | Status: SHIPPED | OUTPATIENT
Start: 2019-11-14 | End: 2020-11-18 | Stop reason: SDUPTHER

## 2019-11-20 PROBLEM — D12.6 ADENOMATOUS COLON POLYP: Status: ACTIVE | Noted: 2019-11-20

## 2019-11-20 PROBLEM — E55.9 VITAMIN D DEFICIENCY: Status: ACTIVE | Noted: 2019-11-20

## 2020-11-18 RX ORDER — IPRATROPIUM BROMIDE 21 UG/1
2 SPRAY, METERED NASAL 2 TIMES DAILY
Qty: 3 BOTTLE | Refills: 0 | Status: SHIPPED | OUTPATIENT
Start: 2020-11-18 | End: 2021-02-11

## 2020-11-30 ENCOUNTER — OFFICE VISIT (OUTPATIENT)
Dept: INTERNAL MEDICINE CLINIC | Age: 70
End: 2020-11-30
Payer: MEDICARE

## 2020-11-30 VITALS
HEIGHT: 68 IN | HEART RATE: 66 BPM | WEIGHT: 208 LBS | TEMPERATURE: 97.3 F | DIASTOLIC BLOOD PRESSURE: 78 MMHG | SYSTOLIC BLOOD PRESSURE: 138 MMHG | BODY MASS INDEX: 31.52 KG/M2 | OXYGEN SATURATION: 98 %

## 2020-11-30 PROCEDURE — G8484 FLU IMMUNIZE NO ADMIN: HCPCS | Performed by: FAMILY MEDICINE

## 2020-11-30 PROCEDURE — G8417 CALC BMI ABV UP PARAM F/U: HCPCS | Performed by: FAMILY MEDICINE

## 2020-11-30 PROCEDURE — 4040F PNEUMOC VAC/ADMIN/RCVD: CPT | Performed by: FAMILY MEDICINE

## 2020-11-30 PROCEDURE — 90694 VACC AIIV4 NO PRSRV 0.5ML IM: CPT | Performed by: FAMILY MEDICINE

## 2020-11-30 PROCEDURE — G8427 DOCREV CUR MEDS BY ELIG CLIN: HCPCS | Performed by: FAMILY MEDICINE

## 2020-11-30 PROCEDURE — 1123F ACP DISCUSS/DSCN MKR DOCD: CPT | Performed by: FAMILY MEDICINE

## 2020-11-30 PROCEDURE — G0008 ADMIN INFLUENZA VIRUS VAC: HCPCS | Performed by: FAMILY MEDICINE

## 2020-11-30 PROCEDURE — 3017F COLORECTAL CA SCREEN DOC REV: CPT | Performed by: FAMILY MEDICINE

## 2020-11-30 PROCEDURE — 99204 OFFICE O/P NEW MOD 45 MIN: CPT | Performed by: FAMILY MEDICINE

## 2020-11-30 PROCEDURE — 1036F TOBACCO NON-USER: CPT | Performed by: FAMILY MEDICINE

## 2020-11-30 ASSESSMENT — PATIENT HEALTH QUESTIONNAIRE - PHQ9
SUM OF ALL RESPONSES TO PHQ9 QUESTIONS 1 & 2: 0
SUM OF ALL RESPONSES TO PHQ QUESTIONS 1-9: 0
2. FEELING DOWN, DEPRESSED OR HOPELESS: 0
SUM OF ALL RESPONSES TO PHQ QUESTIONS 1-9: 0
SUM OF ALL RESPONSES TO PHQ QUESTIONS 1-9: 0
1. LITTLE INTEREST OR PLEASURE IN DOING THINGS: 0

## 2020-11-30 ASSESSMENT — ENCOUNTER SYMPTOMS
CONSTIPATION: 0
ABDOMINAL PAIN: 0
DIARRHEA: 0
CHEST TIGHTNESS: 0
NAUSEA: 0
EYES NEGATIVE: 1
SHORTNESS OF BREATH: 0
BLOOD IN STOOL: 0

## 2020-11-30 NOTE — PROGRESS NOTES
Aguilar Waller  1950  65 y.o.  male    Chief Complaint   Patient presents with    [facility]    H&P         History of Present Illness  Aguilar Waller is a 65 y.o. male who presents today for new patient visit/physical. Previous pt of Dr. Linnea Key. Last seen 11/2019. Patient Active Problem List    Diagnosis Date Noted    History of temporal arteritis 12/06/2020    Vasomotor rhinitis     Vitamin D deficiency 11/20/2019    Adenomatous colon polyp 11/20/2019    Allergic rhinitis 12/15/2011    GERD  12/15/2011    Lumbar spinal stenosis 12/15/2011    Prostate cancer-s/p resection 8-2003 12/15/2011   -Pt with history of prostate cancer. He was seen by Dr. Rose Mary Lewis- Urology.   -He had a colonoscopy 11/2018 with Dr. Jian Persaud. Mixed tubular adenoma/hyperplastic polyp. Recall 3 years  -Hx of temporal arteritis. He has been off of Prednisone for about 3 years and his sed rate has been stable. He had a positive biopsy in 2014  -GERD-Stable on Brand Pepcid. He can't take the generic   Vitamin D def., Allergic rhinitis- stable  -Chronic R knee pain. He has noticed more symptoms. No known injury  -DDD-lumbar. Lumbar spinal stenosis per chart. He has chronic back pain with current slight increase in symptoms. He will feel pain into the posterior R buttocks     Review of Systems   Constitutional: Negative for chills, diaphoresis and fever. HENT: Negative. Eyes: Negative. Respiratory: Negative for cough, chest tightness and shortness of breath. Cardiovascular: Negative for chest pain and palpitations. Gastrointestinal: Negative for abdominal pain, blood in stool, constipation, diarrhea and nausea. Genitourinary: Negative for difficulty urinating and dysuria. Musculoskeletal: Positive for arthralgias and back pain. Negative for neck pain. Skin: Negative. Neurological: Negative for dizziness, light-headedness and headaches. Psychiatric/Behavioral: Negative for dysphoric mood.        Allergies Allergen Reactions    Famotidine      Rash    Fexofenadine      Skin itches/feels prickly    Ranitidine Hcl      Swelling       Past Medical History:   Diagnosis Date    Allergic rhinitis     Colon polyp     adenoma    GERD (gastroesophageal reflux disease) 7/1996    by UGI    One undescended testicle     right testicle as a child    PONV (postoperative nausea and vomiting)     Prostate cancer ([facility] [address] 8/2003    S/P resection    Spinal stenosis, lumbar 11/1994    by MRI    Temporal arteritis ([facility] [address] 6/2014    Vasomotor rhinitis        Past Surgical History:   Procedure Laterality Date    ARTERY BIOPSY  6/2/2014    Temporal artery    COLONOSCOPY  8/20/2013,5/16/2008, 2004    COLONOSCOPY  11/14/2018    6mm sessile residual poluyp in hepatic flexure, grade 2 hemorrhoids    COLONOSCOPY N/A 11/14/2018    COLONOSCOPY CONTROL HEMORRHAGE with eleview injection and polypectomy with application of two hemoclips performed by Francois Palma MD at [address] [facility] [address]      right    OTHER SURGICAL HISTORY      pt had abscesses on on and leg removed and drain placed, years ago   [facility]  2/2005    left    SINUS SURGERY      right maxillary    TESTICLE SURGERY      right testicular release       Family History   Problem Relation Age of Onset    Cancer Mother         pancreatic     Cancer Father         prostate    Dementia Father        Social History     Tobacco Use    Smoking status: Never Smoker    Smokeless tobacco: Never Used   Substance Use Topics    Alcohol use: No    Drug use: No       Current Outpatient Medications   Medication Sig Dispense Refill    ipratropium (ATROVENT) 0.03 % nasal spray 2 sprays by Nasal route 2 times daily 3 Bottle 0    PEPCID 20 MG tablet TAKE 1 TABLET BY MOUTH TWICE A  tablet 0    b complex vitamins capsule Take 1 capsule by mouth every morning       aspirin 81 MG tablet Take 81 mg by mouth three times a week  Krill Oil 300 MG CAPS Take 1 capsule by mouth nightly       Cholecalciferol (VITAMIN D3) 2000 UNITS CAPS Take by mouth every morning       ALLEGRA 180 MG tablet Take 1 tablet by mouth daily. (Patient taking differently: Take 180 mg by mouth nightly ) 90 tablet 3     No current facility-administered medications for this visit. OBJECTIVE:    /78   Pulse 66   Temp 97.3 °F (36.3 °C)   Ht 5' 8\" (1.727 m)   Wt 208 lb (94.3 kg)   SpO2 98%   BMI 31.63 kg/m²     Physical Exam  Vitals signs reviewed. Constitutional:       General: He is not in acute distress. Appearance: He is well-developed. HENT:      Head: Normocephalic and atraumatic. Right Ear: Tympanic membrane normal.      Left Ear: Tympanic membrane normal.      Nose: Nose normal.      Mouth/Throat:      Mouth: Mucous membranes are moist.   Eyes:      Extraocular Movements: Extraocular movements intact. Pupils: Pupils are equal, round, and reactive to light. Neck:      Musculoskeletal: Neck supple. Cardiovascular:      Rate and Rhythm: Normal rate and regular rhythm. Pulmonary:      Effort: Pulmonary effort is normal. No respiratory distress. Breath sounds: Normal breath sounds. Abdominal:      General: Bowel sounds are normal. There is no distension. Palpations: Abdomen is soft. Tenderness: There is no abdominal tenderness. Musculoskeletal:         General: Tenderness (R knee and lower back) present. Right lower leg: No edema. Left lower leg: No edema. Skin:     General: Skin is warm and dry. Neurological:      Mental Status: He is alert and oriented to person, place, and time. Cranial Nerves: No cranial nerve deficit. Sensory: No sensory deficit. Motor: No weakness. Psychiatric:         Mood and Affect: Mood normal.         ASSESSMENT:  1. Gastroesophageal reflux disease without esophagitis    2. DDD (degenerative disc disease), lumbar    3.  Chronic pain of right knee 4. Arthralgia, unspecified joint    5. Non-seasonal allergic rhinitis, unspecified trigger    6. Vitamin D deficiency    7. Personal history of prostate cancer    8. Need for immunization against influenza        PLAN:    Orders Placed This Encounter   Procedures    XR KNEE RIGHT (3 VIEWS)    XR LUMBAR SPINE (MIN 4 VIEWS)    INFLUENZA, QUADV, ADJUVANTED, 65 YRS =, IM, PF, PREFILL SYR, 0.5ML (FLUAD)    SEDIMENTATION RATE    CBC Auto Differential    Comprehensive Metabolic Panel    Urinalysis with Microscopic    VITAMIN D 25 HYDROXY    PSA, Prostatic Specific Antigen   Recent Epic reports reviewed/summarized  Obtain lab  Obtain Xrays  Continue medications  The patient is asked to make an attempt to improve diet and exercise patterns to aid in medical management of this problem.   Persist RTO or call             Return for Follow up in January for physical.    Electronically Signed by Doroteo Cortes DO

## 2020-12-02 ENCOUNTER — HOSPITAL ENCOUNTER (OUTPATIENT)
Dept: GENERAL RADIOLOGY | Age: 70
Discharge: HOME | End: 2020-12-02
Payer: MEDICARE

## 2020-12-02 ENCOUNTER — HOSPITAL ENCOUNTER (OUTPATIENT)
Age: 70
Discharge: HOME | End: 2020-12-02
Payer: MEDICARE

## 2020-12-02 LAB
ALBUMIN SERPL-MCNC: 4.5 GM/DL (ref 3.4–5)
ALP BLD-CCNC: 73 IU/L (ref 40–129)
ALT SERPL-CCNC: 6 U/L (ref 10–40)
ANION GAP SERPL CALCULATED.3IONS-SCNC: 11 MMOL/L (ref 4–16)
AST SERPL-CCNC: 16 IU/L (ref 15–37)
BACTERIA: NEGATIVE /HPF
BASOPHILS ABSOLUTE: 0.1 K/CU MM
BASOPHILS RELATIVE PERCENT: 0.9 % (ref 0–1)
BILIRUB SERPL-MCNC: 0.7 MG/DL (ref 0–1)
BILIRUBIN URINE: NEGATIVE MG/DL
BLOOD, URINE: NEGATIVE
BUN BLDV-MCNC: 13 MG/DL (ref 6–23)
CALCIUM SERPL-MCNC: 9.2 MG/DL (ref 8.3–10.6)
CHLORIDE BLD-SCNC: 101 MMOL/L (ref 99–110)
CLARITY: CLEAR
CO2: 27 MMOL/L (ref 21–32)
COLOR: YELLOW
CREAT SERPL-MCNC: 1.1 MG/DL (ref 0.9–1.3)
DIFFERENTIAL TYPE: ABNORMAL
EOSINOPHILS ABSOLUTE: 0.4 K/CU MM
EOSINOPHILS RELATIVE PERCENT: 6.3 % (ref 0–3)
ERYTHROCYTE SEDIMENTATION RATE: 20 MM/HR (ref 0–20)
GFR AFRICAN AMERICAN: >60 ML/MIN/1.73M2
GFR NON-AFRICAN AMERICAN: >60 ML/MIN/1.73M2
GLUCOSE BLD-MCNC: 86 MG/DL (ref 70–99)
GLUCOSE, URINE: NEGATIVE MG/DL
HCT VFR BLD CALC: 43.9 % (ref 42–52)
HEMOGLOBIN: 14.6 GM/DL (ref 13.5–18)
IMMATURE NEUTROPHIL %: 0.1 % (ref 0–0.43)
KETONES, URINE: NEGATIVE MG/DL
LEUKOCYTE ESTERASE, URINE: NEGATIVE
LYMPHOCYTES ABSOLUTE: 2.4 K/CU MM
LYMPHOCYTES RELATIVE PERCENT: 34.5 % (ref 24–44)
MCH RBC QN AUTO: 31.5 PG (ref 27–31)
MCHC RBC AUTO-ENTMCNC: 33.3 % (ref 32–36)
MCV RBC AUTO: 94.6 FL (ref 78–100)
MONOCYTES ABSOLUTE: 0.5 K/CU MM
MONOCYTES RELATIVE PERCENT: 7.9 % (ref 0–4)
MUCUS: ABNORMAL HPF
NITRITE URINE, QUANTITATIVE: NEGATIVE
NUCLEATED RBC %: 0 %
PDW BLD-RTO: 11.9 % (ref 11.7–14.9)
PH, URINE: 6 (ref 5–8)
PLATELET # BLD: 221 K/CU MM (ref 140–440)
PMV BLD AUTO: 10.2 FL (ref 7.5–11.1)
POTASSIUM SERPL-SCNC: 4.1 MMOL/L (ref 3.5–5.1)
PROSTATE SPECIFIC ANTIGEN: 0.01 NG/ML (ref 0–4)
PROTEIN UA: NEGATIVE MG/DL
RBC # BLD: 4.64 M/CU MM (ref 4.6–6.2)
RBC URINE: <1 /HPF (ref 0–3)
SEGMENTED NEUTROPHILS ABSOLUTE COUNT: 3.5 K/CU MM
SEGMENTED NEUTROPHILS RELATIVE PERCENT: 50.3 % (ref 36–66)
SODIUM BLD-SCNC: 139 MMOL/L (ref 135–145)
SPECIFIC GRAVITY UA: 1.01 (ref 1–1.03)
SQUAMOUS EPITHELIAL: <1 /HPF
TOTAL IMMATURE NEUTOROPHIL: 0.01 K/CU MM
TOTAL NUCLEATED RBC: 0 K/CU MM
TOTAL PROTEIN: 7.2 GM/DL (ref 6.4–8.2)
TRICHOMONAS: ABNORMAL /HPF
UROBILINOGEN, URINE: NORMAL MG/DL (ref 0.2–1)
VITAMIN D 25-HYDROXY: 35.49 NG/ML
WBC # BLD: 6.9 K/CU MM (ref 4–10.5)
WBC UA: <1 /HPF (ref 0–2)

## 2020-12-02 PROCEDURE — 85025 COMPLETE CBC W/AUTO DIFF WBC: CPT

## 2020-12-02 PROCEDURE — 36415 COLL VENOUS BLD VENIPUNCTURE: CPT

## 2020-12-02 PROCEDURE — 85652 RBC SED RATE AUTOMATED: CPT

## 2020-12-02 PROCEDURE — 82306 VITAMIN D 25 HYDROXY: CPT

## 2020-12-02 PROCEDURE — 72110 X-RAY EXAM L-2 SPINE 4/>VWS: CPT

## 2020-12-02 PROCEDURE — 80053 COMPREHEN METABOLIC PANEL: CPT

## 2020-12-02 PROCEDURE — G0103 PSA SCREENING: HCPCS

## 2020-12-02 PROCEDURE — 81001 URINALYSIS AUTO W/SCOPE: CPT

## 2020-12-02 PROCEDURE — 73562 X-RAY EXAM OF KNEE 3: CPT

## 2020-12-06 PROBLEM — Z87.39 HISTORY OF TEMPORAL ARTERITIS: Status: ACTIVE | Noted: 2020-12-06

## 2020-12-06 ASSESSMENT — ENCOUNTER SYMPTOMS
BACK PAIN: 1
COUGH: 0

## 2021-01-19 ENCOUNTER — OFFICE VISIT (OUTPATIENT)
Dept: INTERNAL MEDICINE CLINIC | Age: 71
End: 2021-01-19
Payer: MEDICARE

## 2021-01-19 VITALS
OXYGEN SATURATION: 98 % | BODY MASS INDEX: 32.65 KG/M2 | HEIGHT: 67 IN | SYSTOLIC BLOOD PRESSURE: 118 MMHG | WEIGHT: 208 LBS | HEART RATE: 74 BPM | DIASTOLIC BLOOD PRESSURE: 72 MMHG | TEMPERATURE: 97.5 F

## 2021-01-19 DIAGNOSIS — R94.31 ABNORMAL EKG: ICD-10-CM

## 2021-01-19 DIAGNOSIS — M51.36 DDD (DEGENERATIVE DISC DISEASE), LUMBAR: ICD-10-CM

## 2021-01-19 DIAGNOSIS — R19.00 REDUCIBLE BULGE OF ABDOMINAL WALL: ICD-10-CM

## 2021-01-19 DIAGNOSIS — K21.9 GASTROESOPHAGEAL REFLUX DISEASE WITHOUT ESOPHAGITIS: Primary | ICD-10-CM

## 2021-01-19 DIAGNOSIS — J30.89 NON-SEASONAL ALLERGIC RHINITIS, UNSPECIFIED TRIGGER: ICD-10-CM

## 2021-01-19 DIAGNOSIS — E55.9 VITAMIN D DEFICIENCY: ICD-10-CM

## 2021-01-19 DIAGNOSIS — Z85.46 PERSONAL HISTORY OF PROSTATE CANCER: ICD-10-CM

## 2021-01-19 PROCEDURE — G8417 CALC BMI ABV UP PARAM F/U: HCPCS | Performed by: FAMILY MEDICINE

## 2021-01-19 PROCEDURE — 1123F ACP DISCUSS/DSCN MKR DOCD: CPT | Performed by: FAMILY MEDICINE

## 2021-01-19 PROCEDURE — 4040F PNEUMOC VAC/ADMIN/RCVD: CPT | Performed by: FAMILY MEDICINE

## 2021-01-19 PROCEDURE — G8484 FLU IMMUNIZE NO ADMIN: HCPCS | Performed by: FAMILY MEDICINE

## 2021-01-19 PROCEDURE — 99214 OFFICE O/P EST MOD 30 MIN: CPT | Performed by: FAMILY MEDICINE

## 2021-01-19 PROCEDURE — 3017F COLORECTAL CA SCREEN DOC REV: CPT | Performed by: FAMILY MEDICINE

## 2021-01-19 PROCEDURE — G8427 DOCREV CUR MEDS BY ELIG CLIN: HCPCS | Performed by: FAMILY MEDICINE

## 2021-01-19 PROCEDURE — 1036F TOBACCO NON-USER: CPT | Performed by: FAMILY MEDICINE

## 2021-01-19 PROCEDURE — 93000 ELECTROCARDIOGRAM COMPLETE: CPT | Performed by: FAMILY MEDICINE

## 2021-01-19 ASSESSMENT — ENCOUNTER SYMPTOMS
ABDOMINAL PAIN: 0
CONSTIPATION: 0
DIARRHEA: 0
BACK PAIN: 0
NAUSEA: 0
COUGH: 0
BLOOD IN STOOL: 0
SHORTNESS OF BREATH: 0
EYES NEGATIVE: 1

## 2021-01-19 ASSESSMENT — PATIENT HEALTH QUESTIONNAIRE - PHQ9
SUM OF ALL RESPONSES TO PHQ9 QUESTIONS 1 & 2: 0
SUM OF ALL RESPONSES TO PHQ QUESTIONS 1-9: 0
1. LITTLE INTEREST OR PLEASURE IN DOING THINGS: 0

## 2021-01-19 NOTE — PROGRESS NOTES
Zac Ponce  1950  65 y.o.  male    Chief Complaint   Patient presents with    H&P         History of Present Illness  Zac Ponce is a 65 y.o. male who presents today for a physical. Last C-scope 11/2018 with Dr. Dm Mcgill. +Polyps. Recall 3 years. Last labs reviewed. Hx of prostate cancer and resection  Patient Active Problem List    Diagnosis Date Noted    History of temporal arteritis 12/06/2020    Vasomotor rhinitis     Vitamin D deficiency 11/20/2019    Adenomatous colon polyp 11/20/2019    Allergic rhinitis 12/15/2011    GERD  12/15/2011    Lumbar spinal stenosis 12/15/2011    Prostate cancer-s/p resection 8-2003 12/15/2011   - Pt requesting EKG as his previous PCP monitored for 'changes'. No symptoms  -GERD, Vitamin D deficiency, DDD, allergies-stable  -He c/o of a bulge in his abdomen when he raise from laying down. Marialuisa Aguilar He is interested in surgical correction    Review of Systems   Constitutional: Negative for chills, diaphoresis and fever. HENT: Negative. Eyes: Negative. Respiratory: Negative for cough and shortness of breath. Cardiovascular: Negative for chest pain and palpitations. Gastrointestinal: Negative for abdominal pain, blood in stool, constipation, diarrhea and nausea. Genitourinary: Negative for dysuria. Musculoskeletal: Negative for back pain. Neurological: Negative for dizziness, light-headedness and headaches. Psychiatric/Behavioral: Negative for dysphoric mood.        Allergies   Allergen Reactions    Famotidine      Rash    Fexofenadine      Skin itches/feels prickly    Ranitidine Hcl      Swelling       Past Medical History:   Diagnosis Date    Allergic rhinitis     Colon polyp     adenoma    GERD (gastroesophageal reflux disease) 7/1996    by UGI    One undescended testicle     right testicle as a child    PONV (postoperative nausea and vomiting)     Prostate cancer ([facility] [address] 8/2003    S/P resection    Spinal stenosis, lumbar 11/1994    by MRI    Temporal arteritis ([facility] [address] 6/2014    Vasomotor rhinitis        Past Surgical History:   Procedure Laterality Date    ARTERY BIOPSY  6/2/2014    Temporal artery    COLONOSCOPY  8/20/2013,5/16/2008, 2004    COLONOSCOPY  11/14/2018    6mm sessile residual poluyp in hepatic flexure, grade 2 hemorrhoids    COLONOSCOPY N/A 11/14/2018    COLONOSCOPY CONTROL HEMORRHAGE with eleview injection and polypectomy with application of two hemoclips performed by Roro Jensen MD at [address] [facility] [address] [facility] [address]      right    OTHER SURGICAL HISTORY      pt had abscesses on on and leg removed and drain placed, years ago   [facility]  2/2005    left    SINUS SURGERY      right maxillary    TESTICLE SURGERY      right testicular release       Family History   Problem Relation Age of Onset    Cancer Mother         pancreatic     Cancer Father         prostate    Dementia Father        Social History     Tobacco Use    Smoking status: Never Smoker    Smokeless tobacco: Never Used   Substance Use Topics    Alcohol use: No    Drug use: No       Current Outpatient Medications   Medication Sig Dispense Refill    Zinc Sulfate (ZINC 15 PO) Take by mouth      ipratropium (ATROVENT) 0.03 % nasal spray 2 sprays by Nasal route 2 times daily 3 Bottle 0    PEPCID 20 MG tablet TAKE 1 TABLET BY MOUTH TWICE A  tablet 0    b complex vitamins capsule Take 1 capsule by mouth every morning       aspirin 81 MG tablet Take 81 mg by mouth three times a week       Krill Oil 300 MG CAPS Take 1 capsule by mouth nightly       Cholecalciferol (VITAMIN D3) 2000 UNITS CAPS Take by mouth every morning       ALLEGRA 180 MG tablet Take 1 tablet by mouth daily. (Patient taking differently: Take 180 mg by mouth nightly ) 90 tablet 3     No current facility-administered medications for this visit.         OBJECTIVE:    /72   Pulse 74   Temp 97.5 °F (36.4 °C)   Ht 5' 7\" (1.702 m)   Wt 208 lb (94.3 kg)   SpO2 98%   BMI 32.58 kg/m²     Physical Exam  Vitals signs reviewed. Constitutional:       General: He is not in acute distress. HENT:      Right Ear: Tympanic membrane normal.      Left Ear: Tympanic membrane normal.      Nose: Nose normal.      Mouth/Throat:      Mouth: Mucous membranes are moist.   Eyes:      Extraocular Movements: Extraocular movements intact. Pupils: Pupils are equal, round, and reactive to light. Neck:      Musculoskeletal: Neck supple. Cardiovascular:      Rate and Rhythm: Normal rate and regular rhythm. Pulmonary:      Effort: Pulmonary effort is normal. No respiratory distress. Breath sounds: Normal breath sounds. Abdominal:      General: Bowel sounds are normal. There is no distension. Palpations: Abdomen is soft. Tenderness: There is no abdominal tenderness. Comments: Linear bulge in abdomen -midline   Musculoskeletal:      Right lower leg: No edema. Left lower leg: No edema. Skin:     General: Skin is warm and dry. Neurological:      Mental Status: He is alert and oriented to person, place, and time. Cranial Nerves: No cranial nerve deficit. Psychiatric:         Mood and Affect: Mood normal.         ASSESSMENT:  1. Gastroesophageal reflux disease without esophagitis    2. DDD (degenerative disc disease), lumbar    3. Non-seasonal allergic rhinitis, unspecified trigger    4. Vitamin D deficiency    5. Reducible bulge of abdominal wall    6. Abnormal EKG    7. Personal history of prostate cancer        PLAN:    Orders Placed This Encounter   Procedures    EKG 12 Lead   Per orders  Continue medications  Pt will like to see a surgeon-will refer to a provider on his plan. He may have RAD +/- hernia  The patient is asked to make an attempt to improve diet and exercise patterns to aid in medical management of this problem. Return in about 6 months (around 7/19/2021) for Check up.     Electronically Signed by Hawa Pacheco Héctor Thorne

## 2021-01-24 ENCOUNTER — TELEPHONE (OUTPATIENT)
Dept: INTERNAL MEDICINE CLINIC | Age: 71
End: 2021-01-24

## 2021-01-24 DIAGNOSIS — K43.9 HERNIA OF ABDOMINAL WALL: Primary | ICD-10-CM

## 2021-02-04 ENCOUNTER — HOSPITAL ENCOUNTER (OUTPATIENT)
Dept: CT IMAGING | Age: 71
Discharge: HOME | End: 2021-02-04
Payer: MEDICARE

## 2021-02-04 DIAGNOSIS — K43.9 HERNIA OF ABDOMINAL WALL: ICD-10-CM

## 2021-02-04 PROCEDURE — 74176 CT ABD & PELVIS W/O CONTRAST: CPT

## 2021-02-07 DIAGNOSIS — R19.00 REDUCIBLE BULGE OF ABDOMINAL WALL: Primary | ICD-10-CM

## 2021-02-11 RX ORDER — IPRATROPIUM BROMIDE 21 UG/1
SPRAY, METERED NASAL
Qty: 1 BOTTLE | Refills: 2 | Status: SHIPPED | OUTPATIENT
Start: 2021-02-11

## 2021-07-20 ENCOUNTER — OFFICE VISIT (OUTPATIENT)
Dept: INTERNAL MEDICINE CLINIC | Age: 71
End: 2021-07-20
Payer: MEDICARE

## 2021-07-20 VITALS
SYSTOLIC BLOOD PRESSURE: 134 MMHG | HEIGHT: 67 IN | BODY MASS INDEX: 32.08 KG/M2 | WEIGHT: 204.4 LBS | DIASTOLIC BLOOD PRESSURE: 80 MMHG | HEART RATE: 64 BPM | OXYGEN SATURATION: 98 %

## 2021-07-20 DIAGNOSIS — K76.89 LIVER CYST: ICD-10-CM

## 2021-07-20 DIAGNOSIS — J30.89 NON-SEASONAL ALLERGIC RHINITIS, UNSPECIFIED TRIGGER: ICD-10-CM

## 2021-07-20 DIAGNOSIS — K21.9 GASTROESOPHAGEAL REFLUX DISEASE WITHOUT ESOPHAGITIS: Primary | ICD-10-CM

## 2021-07-20 DIAGNOSIS — K42.9 UMBILICAL HERNIA WITHOUT OBSTRUCTION AND WITHOUT GANGRENE: ICD-10-CM

## 2021-07-20 DIAGNOSIS — K40.90 UNILATERAL INGUINAL HERNIA WITHOUT OBSTRUCTION OR GANGRENE, RECURRENCE NOT SPECIFIED: ICD-10-CM

## 2021-07-20 PROCEDURE — G8417 CALC BMI ABV UP PARAM F/U: HCPCS | Performed by: FAMILY MEDICINE

## 2021-07-20 PROCEDURE — G8427 DOCREV CUR MEDS BY ELIG CLIN: HCPCS | Performed by: FAMILY MEDICINE

## 2021-07-20 PROCEDURE — 4040F PNEUMOC VAC/ADMIN/RCVD: CPT | Performed by: FAMILY MEDICINE

## 2021-07-20 PROCEDURE — 99213 OFFICE O/P EST LOW 20 MIN: CPT | Performed by: FAMILY MEDICINE

## 2021-07-20 PROCEDURE — 1036F TOBACCO NON-USER: CPT | Performed by: FAMILY MEDICINE

## 2021-07-20 PROCEDURE — 1123F ACP DISCUSS/DSCN MKR DOCD: CPT | Performed by: FAMILY MEDICINE

## 2021-07-20 PROCEDURE — 3017F COLORECTAL CA SCREEN DOC REV: CPT | Performed by: FAMILY MEDICINE

## 2021-07-20 SDOH — ECONOMIC STABILITY: FOOD INSECURITY: WITHIN THE PAST 12 MONTHS, THE FOOD YOU BOUGHT JUST DIDN'T LAST AND YOU DIDN'T HAVE MONEY TO GET MORE.: NEVER TRUE

## 2021-07-20 SDOH — ECONOMIC STABILITY: FOOD INSECURITY: WITHIN THE PAST 12 MONTHS, YOU WORRIED THAT YOUR FOOD WOULD RUN OUT BEFORE YOU GOT MONEY TO BUY MORE.: NEVER TRUE

## 2021-07-20 ASSESSMENT — ENCOUNTER SYMPTOMS
CHEST TIGHTNESS: 0
COUGH: 0
ABDOMINAL PAIN: 0
BACK PAIN: 0
SHORTNESS OF BREATH: 0
NAUSEA: 0

## 2021-07-20 ASSESSMENT — PATIENT HEALTH QUESTIONNAIRE - PHQ9
SUM OF ALL RESPONSES TO PHQ QUESTIONS 1-9: 0
SUM OF ALL RESPONSES TO PHQ9 QUESTIONS 1 & 2: 0
SUM OF ALL RESPONSES TO PHQ QUESTIONS 1-9: 0
2. FEELING DOWN, DEPRESSED OR HOPELESS: 0
SUM OF ALL RESPONSES TO PHQ QUESTIONS 1-9: 0
1. LITTLE INTEREST OR PLEASURE IN DOING THINGS: 0

## 2021-07-20 ASSESSMENT — SOCIAL DETERMINANTS OF HEALTH (SDOH): HOW HARD IS IT FOR YOU TO PAY FOR THE VERY BASICS LIKE FOOD, HOUSING, MEDICAL CARE, AND HEATING?: NOT HARD AT ALL

## 2021-07-20 NOTE — PROGRESS NOTES
palpitations. Gastrointestinal: Negative for abdominal pain and nausea. Genitourinary: Negative for difficulty urinating. Musculoskeletal: Negative for back pain. Neurological: Negative for dizziness and headaches. Psychiatric/Behavioral: Negative for dysphoric mood. Objective    Vitals:    07/20/21 1113   BP: 134/80   Site: Right Upper Arm   Position: Sitting   Cuff Size: Medium Adult   Pulse: 64   SpO2: 98%   Weight: 204 lb 6.4 oz (92.7 kg)   Height: 5' 7\" (1.702 m)         Physical Exam  Vitals reviewed. Constitutional:       General: He is not in acute distress. Eyes:      Conjunctiva/sclera: Conjunctivae normal.   Cardiovascular:      Rate and Rhythm: Normal rate and regular rhythm. Pulmonary:      Effort: Pulmonary effort is normal. No respiratory distress. Breath sounds: Normal breath sounds. Abdominal:      General: Bowel sounds are normal.      Palpations: Abdomen is soft. Tenderness: There is no abdominal tenderness. Hernia: A hernia is present. Musculoskeletal:      Cervical back: Neck supple. Right lower leg: No edema. Left lower leg: No edema. Neurological:      Mental Status: He is alert and oriented to person, place, and time. Cranial Nerves: No cranial nerve deficit. Psychiatric:         Mood and Affect: Mood normal.              An electronic signature was used to authenticate this note.     --Wilbur Friday, DO

## 2021-09-07 ENCOUNTER — TELEPHONE (OUTPATIENT)
Dept: INTERNAL MEDICINE CLINIC | Age: 71
End: 2021-09-07

## 2021-09-07 NOTE — TELEPHONE ENCOUNTER
[facility] on [address] called him and said they were going to change his Pepcid to something else and were sending you a message about it. He does not want anything else, says he still has some left and will wait for them to get.  pLEASE CALL

## 2021-12-08 RX ORDER — FAMOTIDINE 20 MG
TABLET ORAL
Qty: 180 TABLET | Refills: 3 | Status: SHIPPED | OUTPATIENT
Start: 2021-12-08 | End: 2022-01-05 | Stop reason: SDUPTHER

## 2022-01-03 ENCOUNTER — HOSPITAL ENCOUNTER (OUTPATIENT)
Age: 72
Setting detail: SPECIMEN
Discharge: HOME | End: 2022-01-03
Payer: MEDICARE

## 2022-01-03 ENCOUNTER — TELEPHONE (OUTPATIENT)
Dept: INTERNAL MEDICINE CLINIC | Age: 72
End: 2022-01-03

## 2022-01-03 DIAGNOSIS — R53.81 MALAISE: ICD-10-CM

## 2022-01-03 DIAGNOSIS — R09.89 CHEST CONGESTION: Primary | ICD-10-CM

## 2022-01-03 PROCEDURE — U0003 INFECTIOUS AGENT DETECTION BY NUCLEIC ACID (DNA OR RNA); SEVERE ACUTE RESPIRATORY SYNDROME CORONAVIRUS 2 (SARS-COV-2) (CORONAVIRUS DISEASE [COVID-19]), AMPLIFIED PROBE TECHNIQUE, MAKING USE OF HIGH THROUGHPUT TECHNOLOGIES AS DESCRIBED BY CMS-2020-01-R: HCPCS

## 2022-01-03 PROCEDURE — U0005 INFEC AGEN DETEC AMPLI PROBE: HCPCS

## 2022-01-04 LAB
SARS-COV-2: NOT DETECTED
SOURCE: NORMAL

## 2022-01-05 RX ORDER — FAMOTIDINE 20 MG
TABLET ORAL
Qty: 180 TABLET | Refills: 0 | Status: SHIPPED | OUTPATIENT
Start: 2022-01-05 | End: 2022-01-12 | Stop reason: SDUPTHER

## 2022-01-12 ENCOUNTER — OFFICE VISIT (OUTPATIENT)
Dept: INTERNAL MEDICINE CLINIC | Age: 72
End: 2022-01-12
Payer: MEDICARE

## 2022-01-12 VITALS
DIASTOLIC BLOOD PRESSURE: 70 MMHG | SYSTOLIC BLOOD PRESSURE: 120 MMHG | HEIGHT: 67 IN | TEMPERATURE: 97.7 F | WEIGHT: 208 LBS | OXYGEN SATURATION: 98 % | BODY MASS INDEX: 32.65 KG/M2 | HEART RATE: 62 BPM

## 2022-01-12 DIAGNOSIS — Z85.46 PERSONAL HISTORY OF PROSTATE CANCER: ICD-10-CM

## 2022-01-12 DIAGNOSIS — J01.90 ACUTE BACTERIAL SINUSITIS: ICD-10-CM

## 2022-01-12 DIAGNOSIS — M25.50 ARTHRALGIA, UNSPECIFIED JOINT: ICD-10-CM

## 2022-01-12 DIAGNOSIS — J30.0 VASOMOTOR RHINITIS: ICD-10-CM

## 2022-01-12 DIAGNOSIS — R94.31 ABNORMAL EKG: ICD-10-CM

## 2022-01-12 DIAGNOSIS — K21.9 GASTROESOPHAGEAL REFLUX DISEASE WITHOUT ESOPHAGITIS: Primary | ICD-10-CM

## 2022-01-12 DIAGNOSIS — Z79.899 LONG TERM USE OF DRUG: ICD-10-CM

## 2022-01-12 DIAGNOSIS — E55.9 VITAMIN D DEFICIENCY: ICD-10-CM

## 2022-01-12 DIAGNOSIS — B96.89 ACUTE BACTERIAL SINUSITIS: ICD-10-CM

## 2022-01-12 DIAGNOSIS — M51.36 DDD (DEGENERATIVE DISC DISEASE), LUMBAR: ICD-10-CM

## 2022-01-12 DIAGNOSIS — Z86.010 HISTORY OF COLON POLYPS: ICD-10-CM

## 2022-01-12 LAB
BASOPHILS ABSOLUTE: 0 K/UL (ref 0–0.2)
BASOPHILS RELATIVE PERCENT: 0.7 %
EOSINOPHILS ABSOLUTE: 0.4 K/UL (ref 0–0.6)
EOSINOPHILS RELATIVE PERCENT: 5.1 %
HCT VFR BLD CALC: 40.5 % (ref 40.5–52.5)
HEMOGLOBIN: 14 G/DL (ref 13.5–17.5)
LYMPHOCYTES ABSOLUTE: 2 K/UL (ref 1–5.1)
LYMPHOCYTES RELATIVE PERCENT: 28.1 %
MCH RBC QN AUTO: 31.7 PG (ref 26–34)
MCHC RBC AUTO-ENTMCNC: 34.7 G/DL (ref 31–36)
MCV RBC AUTO: 91.5 FL (ref 80–100)
MONOCYTES ABSOLUTE: 0.6 K/UL (ref 0–1.3)
MONOCYTES RELATIVE PERCENT: 7.9 %
NEUTROPHILS ABSOLUTE: 4.1 K/UL (ref 1.7–7.7)
NEUTROPHILS RELATIVE PERCENT: 58.2 %
PDW BLD-RTO: 13 % (ref 12.4–15.4)
PLATELET # BLD: 196 K/UL (ref 135–450)
PMV BLD AUTO: 8.5 FL (ref 5–10.5)
RBC # BLD: 4.42 M/UL (ref 4.2–5.9)
SEDIMENTATION RATE, ERYTHROCYTE: 18 MM/HR (ref 0–20)
WBC # BLD: 7.1 K/UL (ref 4–11)

## 2022-01-12 PROCEDURE — 4040F PNEUMOC VAC/ADMIN/RCVD: CPT | Performed by: FAMILY MEDICINE

## 2022-01-12 PROCEDURE — 1123F ACP DISCUSS/DSCN MKR DOCD: CPT | Performed by: FAMILY MEDICINE

## 2022-01-12 PROCEDURE — G8427 DOCREV CUR MEDS BY ELIG CLIN: HCPCS | Performed by: FAMILY MEDICINE

## 2022-01-12 PROCEDURE — 1036F TOBACCO NON-USER: CPT | Performed by: FAMILY MEDICINE

## 2022-01-12 PROCEDURE — 3017F COLORECTAL CA SCREEN DOC REV: CPT | Performed by: FAMILY MEDICINE

## 2022-01-12 PROCEDURE — 93000 ELECTROCARDIOGRAM COMPLETE: CPT | Performed by: FAMILY MEDICINE

## 2022-01-12 PROCEDURE — 99214 OFFICE O/P EST MOD 30 MIN: CPT | Performed by: FAMILY MEDICINE

## 2022-01-12 PROCEDURE — 36415 COLL VENOUS BLD VENIPUNCTURE: CPT | Performed by: FAMILY MEDICINE

## 2022-01-12 PROCEDURE — G8417 CALC BMI ABV UP PARAM F/U: HCPCS | Performed by: FAMILY MEDICINE

## 2022-01-12 PROCEDURE — G8484 FLU IMMUNIZE NO ADMIN: HCPCS | Performed by: FAMILY MEDICINE

## 2022-01-12 RX ORDER — AZITHROMYCIN 250 MG/1
250 TABLET, FILM COATED ORAL SEE ADMIN INSTRUCTIONS
Qty: 6 TABLET | Refills: 0 | Status: SHIPPED | OUTPATIENT
Start: 2022-01-12 | End: 2022-01-17

## 2022-01-12 RX ORDER — ZINC GLUCONATE 50 MG
50 TABLET ORAL DAILY
COMMUNITY
End: 2022-10-10 | Stop reason: ALTCHOICE

## 2022-01-12 RX ORDER — FAMOTIDINE 20 MG
TABLET ORAL
Qty: 180 TABLET | Refills: 2 | Status: SHIPPED | OUTPATIENT
Start: 2022-01-12 | End: 2022-04-25 | Stop reason: SDUPTHER

## 2022-01-12 NOTE — PROGRESS NOTES
Rasheed Baird (:  1950) is a 66 y.o. male,Established patient, here for evaluation of the following chief complaint(s):  H&P, Gastroesophageal Reflux, and Other (chronic conditions)         ASSESSMENT/PLAN:  1. Gastroesophageal reflux disease without esophagitis, chronic  -     PEPCID 20 MG tablet; TAKE 1 TABLET BY MOUTH TWICE A DAY, Disp-180 tablet, R-2, DAWNormal  2. Acute bacterial sinusitis  -     azithromycin (ZITHROMAX) 250 MG tablet; Take 1 tablet by mouth See Admin Instructions for 5 days 500mg on day 1 followed by 250mg on days 2 - 5, Disp-6 tablet, R-0Normal  3. Vitamin D deficiency, chronic  -     VITAMIN D 25 HYDROXY  4. Personal history of prostate cancer  -     PSA, Prostatic Specific Antigen; Future  5. Vasomotor rhinitis  Continue medications  6. DDD (degenerative disc disease), lumbar  7. Arthralgia, unspecified joint  -     Sedimentation Rate  8. History of colon polyps  -     Wanda Quintero MD, Gastroenterology, [facility]  9. Long term use of drug  -     CBC Auto Differential  -     COMPREHENSIVE METABOLIC PANEL  10. Abnormal EKG  -     EKG 12 Lead    Keep f/u with specialists  On this date 2022 I have spent 30 minutes reviewing previous notes, test results and face to face with the patient discussing the diagnosis and importance of compliance with the treatment plan as well as documenting on the day of the visit. Return in about 1 year (around 2023) for physical.         Subjective   SUBJECTIVE/OBJECTIVE:  HPI: This  65 yo M here for the following  Patient Active Problem List    Diagnosis Date Noted    History of temporal arteritis 2020    Vasomotor rhinitis     Vitamin D deficiency 2019    Adenomatous colon polyp 2019    Allergic rhinitis 12/15/2011    GERD  12/15/2011    Lumbar spinal stenosis 12/15/2011     GERD- Stable on Pepcid (brand)  He c/o of sinus pressure, congestion, drainage for over a week.  No fever, chills, cough or other  Vasomotor rhinitis  Vitamin D deficiency  DDD- lumbar  Personal history of prostate cancer  Hx of abnl EKG. He would like to repeat today. No Cp or Sob. Review of Systems   Constitutional: Negative for chills, diaphoresis and fever. HENT: Positive for congestion, rhinorrhea and sinus pressure. Negative for sore throat. Eyes: Negative. Respiratory: Negative for cough and shortness of breath. Cardiovascular: Negative for chest pain and palpitations. Gastrointestinal: Negative for abdominal pain, blood in stool, constipation, diarrhea and nausea. Genitourinary: Negative for difficulty urinating and dysuria. Musculoskeletal: Positive for arthralgias and back pain (chronic-stable). Neurological: Negative for dizziness, light-headedness and headaches. Psychiatric/Behavioral: Negative for dysphoric mood. Allergies   Allergen Reactions    Famotidine      Rash    Fexofenadine      Skin itches/feels prickly    Ranitidine Hcl      Swelling     Current Outpatient Medications   Medication Sig Dispense Refill    zinc gluconate 50 MG tablet Take 50 mg by mouth daily      azithromycin (ZITHROMAX) 250 MG tablet Take 1 tablet by mouth See Admin Instructions for 5 days 500mg on day 1 followed by 250mg on days 2 - 5 6 tablet 0    PEPCID 20 MG tablet TAKE 1 TABLET BY MOUTH TWICE A  tablet 2    ipratropium (ATROVENT) 0.03 % nasal spray USE 2 SPRAYS IN EACH NOSTRIL TWICE A DAY 1 Bottle 2    b complex vitamins capsule Take 1 capsule by mouth every morning       aspirin 81 MG tablet Take 81 mg by mouth three times a week       Krill Oil 300 MG CAPS Take 1 capsule by mouth nightly       Cholecalciferol (VITAMIN D3) 2000 UNITS CAPS Take by mouth every morning       ALLEGRA 180 MG tablet Take 1 tablet by mouth daily. (Patient taking differently: Take 180 mg by mouth nightly ) 90 tablet 3     No current facility-administered medications for this visit.      Past Medical History: Diagnosis Date    Allergic rhinitis     Colon polyp     adenoma    Diverticulosis     GERD (gastroesophageal reflux disease) 07/1996    by UGI    Liver cyst     One undescended testicle     right testicle as a child    Periumbilical hernia     PONV (postoperative nausea and vomiting)     Prostate cancer ([facility] [address] 08/2003    S/P resection    Spinal stenosis, lumbar 11/1994    by MRI    Temporal arteritis ([facility] [address] 06/2014    Vasomotor rhinitis      Past Surgical History:   Procedure Laterality Date    ARTERY BIOPSY  6/2/2014    Temporal artery    COLONOSCOPY  8/20/2013,5/16/2008, 2004    COLONOSCOPY  11/14/2018    6mm sessile residual poluyp in hepatic flexure, grade 2 hemorrhoids    COLONOSCOPY N/A 11/14/2018    COLONOSCOPY CONTROL HEMORRHAGE with eleview injection and polypectomy with application of two hemoclips performed by Layla Knapp MD at [address] [facility] [address]      right    OTHER SURGICAL HISTORY      pt had abscesses on on and leg removed and drain placed, years ago    PROSTATECTOMY      ROTATOR CUFF REPAIR  2/2005    left    SINUS SURGERY      right maxillary    TESTICLE SURGERY      right testicular release     Social History     Tobacco Use    Smoking status: Never Smoker    Smokeless tobacco: Never Used   Vaping Use    Vaping Use: Never used   Substance Use Topics    Alcohol use: No    Drug use: No            Vitals:    01/12/22 1028   BP: 120/70   Site: Right Upper Arm   Position: Sitting   Cuff Size: Large Adult   Pulse: 62   Temp: 97.7 °F (36.5 °C)   SpO2: 98%   Weight: 208 lb (94.3 kg)   Height: 5' 7\" (1.702 m)     Objective   Physical Exam  Vitals reviewed. Constitutional:       General: He is not in acute distress. HENT:      Right Ear: Tympanic membrane normal.      Left Ear: Tympanic membrane normal.      Nose: Congestion (sinus pressure) present. Eyes:      Extraocular Movements: Extraocular movements intact.       Conjunctiva/sclera: Conjunctivae normal.      Pupils: Pupils are equal, round, and reactive to light. Cardiovascular:      Rate and Rhythm: Normal rate and regular rhythm. Pulmonary:      Effort: Pulmonary effort is normal. No respiratory distress. Breath sounds: Normal breath sounds. Abdominal:      General: Bowel sounds are normal.      Palpations: Abdomen is soft. Tenderness: There is no abdominal tenderness. Musculoskeletal:      Cervical back: Neck supple. Right lower leg: No edema. Left lower leg: No edema. Skin:     Findings: No rash. Neurological:      General: No focal deficit present. Mental Status: He is alert and oriented to person, place, and time. Psychiatric:         Mood and Affect: Mood normal.                An electronic signature was used to authenticate this note.     --Emilio Franco, DO

## 2022-01-13 ENCOUNTER — TELEPHONE (OUTPATIENT)
Dept: GASTROENTEROLOGY | Age: 72
End: 2022-01-13

## 2022-01-13 LAB
A/G RATIO: 1.7 (ref 1.1–2.2)
ALBUMIN SERPL-MCNC: 4.5 G/DL (ref 3.4–5)
ALP BLD-CCNC: 85 U/L (ref 40–129)
ALT SERPL-CCNC: <5 U/L (ref 10–40)
ANION GAP SERPL CALCULATED.3IONS-SCNC: 11 MMOL/L (ref 3–16)
AST SERPL-CCNC: 15 U/L (ref 15–37)
BILIRUB SERPL-MCNC: 0.7 MG/DL (ref 0–1)
BUN BLDV-MCNC: 16 MG/DL (ref 7–20)
CALCIUM SERPL-MCNC: 9.3 MG/DL (ref 8.3–10.6)
CHLORIDE BLD-SCNC: 103 MMOL/L (ref 99–110)
CO2: 26 MMOL/L (ref 21–32)
CREAT SERPL-MCNC: 1.3 MG/DL (ref 0.8–1.3)
GFR AFRICAN AMERICAN: >60
GFR NON-AFRICAN AMERICAN: 54
GLUCOSE BLD-MCNC: 92 MG/DL (ref 70–99)
POTASSIUM SERPL-SCNC: 3.9 MMOL/L (ref 3.5–5.1)
PROSTATE SPECIFIC ANTIGEN: <0.01 NG/ML (ref 0–4)
SODIUM BLD-SCNC: 140 MMOL/L (ref 136–145)
TOTAL PROTEIN: 7.1 G/DL (ref 6.4–8.2)
VITAMIN D 25-HYDROXY: 41.9 NG/ML

## 2022-01-16 ASSESSMENT — ENCOUNTER SYMPTOMS
RHINORRHEA: 1
BACK PAIN: 1
COUGH: 0
ABDOMINAL PAIN: 0
SINUS PRESSURE: 1
SHORTNESS OF BREATH: 0
EYES NEGATIVE: 1
NAUSEA: 0
DIARRHEA: 0
SORE THROAT: 0
BLOOD IN STOOL: 0
CONSTIPATION: 0

## 2022-01-20 ENCOUNTER — TELEPHONE (OUTPATIENT)
Dept: GASTROENTEROLOGY | Age: 72
End: 2022-01-20

## 2022-01-20 DIAGNOSIS — R94.31 ABNORMAL EKG: ICD-10-CM

## 2022-01-20 DIAGNOSIS — R06.09 DOE (DYSPNEA ON EXERTION): Primary | ICD-10-CM

## 2022-01-20 DIAGNOSIS — R94.4 DECREASED GFR: ICD-10-CM

## 2022-01-20 NOTE — TELEPHONE ENCOUNTER
Pt. retuning call regarding a referral for a colon screening. Completed H&P and informed pt. They would be called in 1-2 weeks to be scheduled. Pt. Stated understanding and H&P put in blue [facility] folder.

## 2022-01-21 ENCOUNTER — TELEPHONE (OUTPATIENT)
Dept: CARDIOLOGY CLINIC | Age: 72
End: 2022-01-21

## 2022-01-24 ENCOUNTER — TELEPHONE (OUTPATIENT)
Dept: CARDIOLOGY CLINIC | Age: 72
End: 2022-01-24

## 2022-01-26 ENCOUNTER — TELEPHONE (OUTPATIENT)
Dept: CARDIOLOGY CLINIC | Age: 72
End: 2022-01-26

## 2022-01-27 ENCOUNTER — TELEPHONE (OUTPATIENT)
Dept: GASTROENTEROLOGY | Age: 72
End: 2022-01-27

## 2022-01-28 ENCOUNTER — INITIAL CONSULT (OUTPATIENT)
Dept: CARDIOLOGY CLINIC | Age: 72
End: 2022-01-28
Payer: MEDICARE

## 2022-01-28 VITALS
BODY MASS INDEX: 32.07 KG/M2 | DIASTOLIC BLOOD PRESSURE: 72 MMHG | HEIGHT: 68 IN | WEIGHT: 211.6 LBS | SYSTOLIC BLOOD PRESSURE: 118 MMHG

## 2022-01-28 DIAGNOSIS — R06.09 DOE (DYSPNEA ON EXERTION): Primary | ICD-10-CM

## 2022-01-28 PROCEDURE — 4040F PNEUMOC VAC/ADMIN/RCVD: CPT | Performed by: INTERNAL MEDICINE

## 2022-01-28 PROCEDURE — G8427 DOCREV CUR MEDS BY ELIG CLIN: HCPCS | Performed by: INTERNAL MEDICINE

## 2022-01-28 PROCEDURE — 1036F TOBACCO NON-USER: CPT | Performed by: INTERNAL MEDICINE

## 2022-01-28 PROCEDURE — 1123F ACP DISCUSS/DSCN MKR DOCD: CPT | Performed by: INTERNAL MEDICINE

## 2022-01-28 PROCEDURE — G8484 FLU IMMUNIZE NO ADMIN: HCPCS | Performed by: INTERNAL MEDICINE

## 2022-01-28 PROCEDURE — 93000 ELECTROCARDIOGRAM COMPLETE: CPT | Performed by: INTERNAL MEDICINE

## 2022-01-28 PROCEDURE — 99204 OFFICE O/P NEW MOD 45 MIN: CPT | Performed by: INTERNAL MEDICINE

## 2022-01-28 PROCEDURE — G8417 CALC BMI ABV UP PARAM F/U: HCPCS | Performed by: INTERNAL MEDICINE

## 2022-01-28 PROCEDURE — 3017F COLORECTAL CA SCREEN DOC REV: CPT | Performed by: INTERNAL MEDICINE

## 2022-01-28 NOTE — PROGRESS NOTES
CARDIOLOGY NOTE      1/28/2022    RE: Elba Bay  (1950)                               TO:  Dr. Isis Samuel DO            Felicita Siegel is a 66 y.o. male who was seen today for management of  Abn ekg                                    HPI:                   Pt has h/o abnormal EKG, GERD seen today for seen by PCP referred here with an abnormal EKG. Pt has ears are coming up help out which per patient is chronic has no chest pain however saw his PCP and had an EKG done which showed an abnormality has been referred here patient has no cardiac risk factors that is no hypertension hyperlipidemia or diabetes has no history of smoking or any family history of heart disease    Elba Bay has the following history recorded in care path:  Patient Active Problem List    Diagnosis Date Noted    History of temporal arteritis 12/06/2020    Vasomotor rhinitis     Vitamin D deficiency 11/20/2019    Adenomatous colon polyp 11/20/2019    Allergic rhinitis 12/15/2011    GERD  12/15/2011    Lumbar spinal stenosis 12/15/2011     Current Outpatient Medications   Medication Sig Dispense Refill    zinc gluconate 50 MG tablet Take 50 mg by mouth daily      PEPCID 20 MG tablet TAKE 1 TABLET BY MOUTH TWICE A  tablet 2    ipratropium (ATROVENT) 0.03 % nasal spray USE 2 SPRAYS IN EACH NOSTRIL TWICE A DAY 1 Bottle 2    b complex vitamins capsule Take 1 capsule by mouth every morning       aspirin 81 MG tablet Take 81 mg by mouth three times a week       Krill Oil 300 MG CAPS Take 1 capsule by mouth nightly       Cholecalciferol (VITAMIN D3) 2000 UNITS CAPS Take by mouth every morning       ALLEGRA 180 MG tablet Take 1 tablet by mouth daily. (Patient taking differently: Take 180 mg by mouth nightly ) 90 tablet 3     No current facility-administered medications for this visit.      Allergies: Famotidine, Fexofenadine, and Ranitidine hcl  Past Medical History:   Diagnosis Date    Allergic rhinitis     Colon polyp     adenoma    Diverticulosis     GERD (gastroesophageal reflux disease) 07/1996    by UGI    Liver cyst     One undescended testicle     right testicle as a child    Periumbilical hernia     PONV (postoperative nausea and vomiting)     Prostate cancer ([facility] [address] 08/2003    S/P resection    Spinal stenosis, lumbar 11/1994    by MRI    Temporal arteritis ([facility] [address] 06/2014    Vasomotor rhinitis      Past Surgical History:   Procedure Laterality Date    ARTERY BIOPSY  6/2/2014    Temporal artery    COLONOSCOPY  8/20/2013,5/16/2008, 2004    COLONOSCOPY  11/14/2018    6mm sessile residual poluyp in hepatic flexure, grade 2 hemorrhoids    COLONOSCOPY N/A 11/14/2018    COLONOSCOPY CONTROL HEMORRHAGE with eleview injection and polypectomy with application of two hemoclips performed by Jairon Steele MD at [address] [facility] [address]      right    OTHER SURGICAL HISTORY      pt had abscesses on on and leg removed and drain placed, years ago   [facility]  2/2005    left    SINUS SURGERY      right maxillary    TESTICLE SURGERY      right testicular release      As reviewed   Family History   Problem Relation Age of Onset    Cancer Mother         pancreatic     Cancer Father         prostate    Dementia Father      Social History     Tobacco Use    Smoking status: Never Smoker    Smokeless tobacco: Never Used   Substance Use Topics    Alcohol use: No        Objective:    Vitals:    01/28/22 1037   BP: 118/72   Weight: 211 lb 9.6 oz (96 kg)   Height: 5' 8\" (1.727 m)     /72   Ht 5' 8\" (1.727 m)   Wt 211 lb 9.6 oz (96 kg)   BMI 32.17 kg/m²     No flowsheet data found. Wt Readings from Last 3 Encounters:   01/28/22 211 lb 9.6 oz (96 kg)   01/12/22 208 lb (94.3 kg)   07/20/21 204 lb 6.4 oz (92.7 kg)     Body mass index is 32.17 kg/m². GENERAL - Alert, oriented, pleasant, in no apparent distress.   EYES: No jaundice, no conjunctival pallor. SKIN: It is warm & dry. No rashes. No Echhymosis    HEENT  No clinically significant abnormalities seen. Neck - Supple. No jugular venous distention noted. No carotid bruits. Cardiovascular  Normal S1 and S2 without obvious murmur or gallop. Extremities - No cyanosis, clubbing, or significant edema. Pulmonary  No respiratory distress. No wheezes or rales. Abdomen  No masses, tenderness, or organomegaly. Musculoskeletal  No significant edema. No joint deformities. No muscle wasting. Neurologic  Cranial nerves II through XII are grossly intact. There were no gross focal neurologic abnormalities.     Lab Review   Lab Results   Component Value Date    CKTOTAL 75 04/02/2018     BNP:  No results found for: BNP  PT/INR:  No results found for: INR  Lab Results   Component Value Date    LABA1C 5.2 11/06/2019    LABA1C 5.3 10/16/2018     Lab Results   Component Value Date    WBC 7.1 01/12/2022    HCT 40.5 01/12/2022    MCV 91.5 01/12/2022     01/12/2022     Lab Results   Component Value Date    CHOL 152 11/06/2019    TRIG 80 11/06/2019    HDL 51 11/06/2019    LDLCALC 85 11/06/2019     Lab Results   Component Value Date    ALT <5 (L) 01/13/2022    AST 15 01/13/2022     BMP:    Lab Results   Component Value Date     01/13/2022    K 3.9 01/13/2022     01/13/2022    CO2 26 01/13/2022    BUN 16 01/13/2022    CREATININE 1.3 01/13/2022     CMP:   Lab Results   Component Value Date     01/13/2022    K 3.9 01/13/2022     01/13/2022    CO2 26 01/13/2022    BUN 16 01/13/2022    PROT 7.1 01/13/2022    PROT 6.7 01/27/2012     TSH:    Lab Results   Component Value Date    TSH 2.07 11/06/2019    TSHHS 0.020 12/23/2010           Assessment & Plan:    -Abnormal EKG  Patient has an abnormal EKG as mentioned above being referred and has been having exertional dyspnea has no chest pain however  Will obtain a regular treadmill stress test and echocardiogram for further clarification    -Repeat EKG done here showed that the patient has right bundle branch block    -Exertional dyspnea work-up will obtain an echocardiogram to evaluate his left ventricular systolic function and also for  any significant valvular heart disease    -Patient had a history of possible gastroesophageal reflux disease and has been on PPI however this can be an anginal equivalent hence stress test will further clarify if the regular treadmill stress test shows an abnormality he might need a nuclear scintigraphic study    Genaro Joseph MD    [facility]

## 2022-01-28 NOTE — PATIENT INSTRUCTIONS
**It is YOUR responsibilty to bring medication bottles and/or updated medication list to [address] [place] [address]. This will allow us to better serve you and all your healthcare needs**  Please be informed that if you contact our office outside of normal business hours the physician on call cannot help with any scheduling or rescheduling issues, procedure instruction questions or any type of medication issue. We advise you for any urgent/emergency that you go to the nearest emergency room! PLEASE CALL OUR OFFICE DURING NORMAL BUSINESS HOURS    Monday - Friday   8 am to 5 pm    [address]: [phone]    [place]:  [phone]  Please hold on to these instructions the  will call you within 1-9 business days when we receive authorization from your insurance. Echocardiogram    WHAT TO EXPECT:   ? This test will take approximately 45 minutes. ? It is an ultrasound of the heart. ? It can look at the valves and chambers inside the heart   ? There is no special instructions for this test.     If you are unable to keep this appointment, please notify us 24 hours prior to test at [phone]. Please hold on to these instructions the  will call you within 1-9 business days when we receive authorization from your insurance. Please hold on to these instructions the  will call you within 1-9 business days when we receive authorization from your insurance. Treadmill Stress test    WHAT TO EXPECT:     The exercise stress test is a test used to provide information about how the heart responds to exertion. It involves walking on a treadmill at increasing levels of difficulty, while electrocardiogram, heart rate, and blood pressure are monitored. ? This test will take approximately 1 hours: Please arrive at the office 5-10 min before the scheduled testing time.     ? Once you are taken back to the stress lab you will be asked to read and sign a consent form before proceeding with the test. At this time feel free to ask any question that you may have as the procedure is explained to you.   ? You will be attached to the EKG monitoring equipment, your blood pressure will be taken, and you will begin walking on the treadmill. The treadmill starts off slowly and every 3 minutes the treadmill speeds up and the elevation increases. The average person usually walks for a period of 6-8min. PREPARATION FOR TEST:    ? Eat a light meal such as juice and toast at least 2 hours prior to the procedure. ? AVOID CAFFEINE 24 HOURS PRIOR TO THE TEST: Including coffee, Tea, Iliana and other soft drinks even those labeled  caffeine free or decaffeinated. ? Please wear loose comfortable clothing and comfortable walking shoes. Please wear a short sleeved shirt. ? Please shower or bathe and do not apply powder or lotion to the skin prior to testing, as the electrodes will adhere better giving us a clearer visual EKG recording.

## 2022-01-31 ENCOUNTER — PROCEDURE VISIT (OUTPATIENT)
Dept: CARDIOLOGY CLINIC | Age: 72
End: 2022-01-31
Payer: MEDICARE

## 2022-01-31 DIAGNOSIS — R06.09 DOE (DYSPNEA ON EXERTION): ICD-10-CM

## 2022-01-31 LAB
LV EF: 58 %
LVEF MODALITY: NORMAL

## 2022-01-31 PROCEDURE — 93306 TTE W/DOPPLER COMPLETE: CPT | Performed by: INTERNAL MEDICINE

## 2022-02-08 ENCOUNTER — PROCEDURE VISIT (OUTPATIENT)
Dept: CARDIOLOGY CLINIC | Age: 72
End: 2022-02-08
Payer: MEDICARE

## 2022-02-08 DIAGNOSIS — R06.09 DOE (DYSPNEA ON EXERTION): ICD-10-CM

## 2022-02-08 PROCEDURE — 93015 CV STRESS TEST SUPVJ I&R: CPT | Performed by: INTERNAL MEDICINE

## 2022-02-09 ENCOUNTER — TELEPHONE (OUTPATIENT)
Dept: GASTROENTEROLOGY | Age: 72
End: 2022-02-09

## 2022-03-10 ENCOUNTER — OFFICE VISIT (OUTPATIENT)
Dept: CARDIOLOGY CLINIC | Age: 72
End: 2022-03-10
Payer: MEDICARE

## 2022-03-10 VITALS
DIASTOLIC BLOOD PRESSURE: 72 MMHG | HEIGHT: 68 IN | SYSTOLIC BLOOD PRESSURE: 116 MMHG | WEIGHT: 211.6 LBS | BODY MASS INDEX: 32.07 KG/M2 | HEART RATE: 63 BPM

## 2022-03-10 DIAGNOSIS — I77.810 DILATED AORTIC ROOT (HCC): Primary | ICD-10-CM

## 2022-03-10 PROCEDURE — 1036F TOBACCO NON-USER: CPT | Performed by: INTERNAL MEDICINE

## 2022-03-10 PROCEDURE — 4040F PNEUMOC VAC/ADMIN/RCVD: CPT | Performed by: INTERNAL MEDICINE

## 2022-03-10 PROCEDURE — 99214 OFFICE O/P EST MOD 30 MIN: CPT | Performed by: INTERNAL MEDICINE

## 2022-03-10 PROCEDURE — G8417 CALC BMI ABV UP PARAM F/U: HCPCS | Performed by: INTERNAL MEDICINE

## 2022-03-10 PROCEDURE — G8427 DOCREV CUR MEDS BY ELIG CLIN: HCPCS | Performed by: INTERNAL MEDICINE

## 2022-03-10 PROCEDURE — 3017F COLORECTAL CA SCREEN DOC REV: CPT | Performed by: INTERNAL MEDICINE

## 2022-03-10 PROCEDURE — G8484 FLU IMMUNIZE NO ADMIN: HCPCS | Performed by: INTERNAL MEDICINE

## 2022-03-10 PROCEDURE — 1123F ACP DISCUSS/DSCN MKR DOCD: CPT | Performed by: INTERNAL MEDICINE

## 2022-03-10 RX ORDER — MAGNESIUM OXIDE 400 MG/1
400 TABLET ORAL DAILY
Qty: 30 TABLET | Refills: 1 | Status: SHIPPED | OUTPATIENT
Start: 2022-03-10 | End: 2022-04-25

## 2022-03-11 ENCOUNTER — TELEPHONE (OUTPATIENT)
Dept: CARDIOLOGY CLINIC | Age: 72
End: 2022-03-11

## 2022-03-11 DIAGNOSIS — R06.09 DOE (DYSPNEA ON EXERTION): Primary | ICD-10-CM

## 2022-03-11 NOTE — TELEPHONE ENCOUNTER
When I spoke to Maria Esther Robles to give him the time/day of his CTA Chest, he had a question for me that he didn't ask when he was here in office. He wanted to know what he can/can't do with an enlarged aorta. If someone could give him a call back with a clinical answer, I'm sure he would appreciate it.

## 2022-03-11 NOTE — TELEPHONE ENCOUNTER
Per Rachael Funk NP, patient advised to avoid bearing down, avoid holding breath, no lifting over 40 lb

## 2022-03-15 ENCOUNTER — HOSPITAL ENCOUNTER (OUTPATIENT)
Dept: CT IMAGING | Age: 72
Discharge: HOME | End: 2022-03-15
Payer: MEDICARE

## 2022-03-15 DIAGNOSIS — I77.810 DILATED AORTIC ROOT (HCC): ICD-10-CM

## 2022-03-15 LAB
GFR AFRICAN AMERICAN: >60 ML/MIN/1.73M2
GFR NON-AFRICAN AMERICAN: 58 ML/MIN/1.73M2
POC CREATININE: 1.2 MG/DL (ref 0.9–1.3)

## 2022-03-15 PROCEDURE — 6360000004 HC RX CONTRAST MEDICATION: Performed by: INTERNAL MEDICINE

## 2022-03-15 PROCEDURE — 2580000003 HC RX 258: Performed by: INTERNAL MEDICINE

## 2022-03-15 PROCEDURE — 71275 CT ANGIOGRAPHY CHEST: CPT

## 2022-03-15 RX ORDER — SODIUM CHLORIDE 0.9 % (FLUSH) 0.9 %
10 SYRINGE (ML) INJECTION PRN
Status: DISCONTINUED | OUTPATIENT
Start: 2022-03-15 | End: 2022-03-16 | Stop reason: HOSPADM

## 2022-03-15 RX ADMIN — IOPAMIDOL 95 ML: 755 INJECTION, SOLUTION INTRAVENOUS at 12:58

## 2022-03-15 RX ADMIN — SODIUM CHLORIDE, PRESERVATIVE FREE 10 ML: 5 INJECTION INTRAVENOUS at 12:55

## 2022-03-21 ENCOUNTER — TELEPHONE (OUTPATIENT)
Dept: CARDIOLOGY CLINIC | Age: 72
End: 2022-03-21

## 2022-03-23 ENCOUNTER — OFFICE VISIT (OUTPATIENT)
Dept: CARDIOLOGY CLINIC | Age: 72
End: 2022-03-23
Payer: MEDICARE

## 2022-03-23 VITALS
HEIGHT: 68 IN | DIASTOLIC BLOOD PRESSURE: 70 MMHG | HEART RATE: 80 BPM | OXYGEN SATURATION: 95 % | SYSTOLIC BLOOD PRESSURE: 122 MMHG | BODY MASS INDEX: 31.67 KG/M2 | WEIGHT: 209 LBS

## 2022-03-23 DIAGNOSIS — I71.21 ANEURYSM OF ASCENDING AORTA: ICD-10-CM

## 2022-03-23 PROCEDURE — G8427 DOCREV CUR MEDS BY ELIG CLIN: HCPCS | Performed by: NURSE PRACTITIONER

## 2022-03-23 PROCEDURE — 99212 OFFICE O/P EST SF 10 MIN: CPT | Performed by: NURSE PRACTITIONER

## 2022-03-23 PROCEDURE — 1123F ACP DISCUSS/DSCN MKR DOCD: CPT | Performed by: NURSE PRACTITIONER

## 2022-03-23 PROCEDURE — 1036F TOBACCO NON-USER: CPT | Performed by: NURSE PRACTITIONER

## 2022-03-23 PROCEDURE — 3017F COLORECTAL CA SCREEN DOC REV: CPT | Performed by: NURSE PRACTITIONER

## 2022-03-23 PROCEDURE — G8484 FLU IMMUNIZE NO ADMIN: HCPCS | Performed by: NURSE PRACTITIONER

## 2022-03-23 PROCEDURE — 4040F PNEUMOC VAC/ADMIN/RCVD: CPT | Performed by: NURSE PRACTITIONER

## 2022-03-23 PROCEDURE — G8417 CALC BMI ABV UP PARAM F/U: HCPCS | Performed by: NURSE PRACTITIONER

## 2022-03-23 ASSESSMENT — ENCOUNTER SYMPTOMS
ORTHOPNEA: 0
SHORTNESS OF BREATH: 1

## 2022-03-23 NOTE — LETTER
Natalee Mena Led  1950  [id number]    Have you had any Chest Pain that is not new? - No    Have you had any Shortness of Breath - Yes  If Yes - When at rest and on exertion    Have you had any dizziness - No    Have you had any palpitations that are not new?  - No    Do you have any edema - swelling in ankles    If Yes - CHECK TO SEE IF THE EDEMA IS PITTING  How long have they been having edema - Years  If Yes - Have they worn compression stockings No    When did you have your last labs drawn 1/13/22    Do you have a surgery or procedure scheduled in the near future - No

## 2022-03-23 NOTE — PATIENT INSTRUCTIONS
Please be informed that if you contact our office outside of normal business hours the physician on call cannot help with any scheduling or rescheduling issues, procedure instruction questions or any type of medication issue. We advise you for any urgent/emergency that you go to the nearest emergency room! PLEASE CALL OUR OFFICE DURING NORMAL BUSINESS HOURS    Monday - Friday   8 am to 5 pm    [place]: [place]: [phone]    [place]:  [phone]      **It is YOUR responsibilty to bring medication bottles and/or updated medication list to [address] [place] [address].  This will allow us to better serve you and all your healthcare needs**

## 2022-03-23 NOTE — PROGRESS NOTES
3/23/2022  Primary cardiologist: Dr. Christina Aparicio  is an established 74 y.o.  male here for follow-up on CT results       SUBJECTIVE/OBJECTIVE:    HPI : Barber Leroy is a pleasant 57-year-old gentleman with a history of an abnormal EKG and GERD. He was initially seen as a consult in January 2002 for abnormal EKG and exertional shortness of breath. EKG showed right bundle branch block    She underwent noninvasive testing with echocardiogram that demonstrated normal left ventricular systolic function EF 55 to 60%. There is dilatation of the aortic root (3.7 cm (and the ascending aorta (5.2 cm). Exercise treadmill stress test was essentially normal.  He was noted to have a rare PVC he had appropriate rise in blood pressure and heart rate during exercise. No ischemia noted. Barber Leroy reports overall he is feeling well. He denies chest pain. He continues to have mild shortness of breath with rest and with exertion. Review of Systems   Constitutional: Negative for diaphoresis and malaise/fatigue. Cardiovascular: Positive for dyspnea on exertion. Negative for chest pain, claudication, irregular heartbeat, leg swelling, near-syncope, orthopnea, palpitations and paroxysmal nocturnal dyspnea. Respiratory: Positive for shortness of breath. Neurological: Negative for dizziness and light-headedness. Vitals:    03/23/22 1119   BP: 122/70   Site: Left Upper Arm   Position: Sitting   Cuff Size: Medium Adult   Pulse: 80   SpO2: 95%   Weight: 209 lb (94.8 kg)   Height: 5' 8\" (1.727 m)     No flowsheet data found. Wt Readings from Last 3 Encounters:   03/23/22 209 lb (94.8 kg)   03/10/22 211 lb 9.6 oz (96 kg)   01/28/22 211 lb 9.6 oz (96 kg)     Body mass index is 31.78 kg/m². Physical Exam  Vitals reviewed. Constitutional:       Appearance: Normal appearance. HENT:      Head: Atraumatic. Mouth/Throat:      Mouth: Mucous membranes are moist.   Neck:      Vascular: No carotid bruit.    Cardiovascular: Rate and Rhythm: Normal rate and regular rhythm. Pulses: Normal pulses. Heart sounds: Normal heart sounds. Pulmonary:      Effort: Pulmonary effort is normal.      Breath sounds: Normal breath sounds. Abdominal:      General: There is no distension. Palpations: Abdomen is soft. Tenderness: There is no abdominal tenderness. Musculoskeletal:      Right lower leg: No edema. Left lower leg: No edema. Skin:     Capillary Refill: Capillary refill takes less than 2 seconds. Neurological:      General: No focal deficit present. Mental Status: He is alert. Psychiatric:         Mood and Affect: Mood normal.                Current Outpatient Medications   Medication Sig Dispense Refill    magnesium oxide (MAG-OX) 400 MG tablet Take 1 tablet by mouth daily 30 tablet 1    zinc gluconate 50 MG tablet Take 50 mg by mouth daily      PEPCID 20 MG tablet TAKE 1 TABLET BY MOUTH TWICE A  tablet 2    ipratropium (ATROVENT) 0.03 % nasal spray USE 2 SPRAYS IN EACH NOSTRIL TWICE A DAY 1 Bottle 2    b complex vitamins capsule Take 1 capsule by mouth every morning       aspirin 81 MG tablet Take 81 mg by mouth three times a week       Krill Oil 300 MG CAPS Take 1 capsule by mouth nightly       Cholecalciferol (VITAMIN D3) 2000 UNITS CAPS Take by mouth every morning       ALLEGRA 180 MG tablet Take 1 tablet by mouth daily. (Patient taking differently: Take 180 mg by mouth nightly ) 90 tablet 3     No current facility-administered medications for this visit. All pertinent data reviewed and discussed with patient       ASSESSMENT/PLAN:    Abnormal EKG  Right bundle branch block  Noted to have PVCs during stress test and started on magnesium    Dilated aorta  Dilated aorta noted per echocardiogram:  Dilation of the aortic root (3.7cm)and the ascending aorta(5.2cm).     CTA of the chest with contrast March 15, 2022  Ascending aortic aneurysm measuring 5 cm.  No evidence of dissection or   other acute abnormality.  No evidence of occlusive disease. Recommend ongoing surveillance with every 6-month CT or MRI ( to decrease exposure to radiation). Medications reviewed and confirmed with patient     Tests ordered:  none      Follow-up  6 months      Signed:  CECILLE Howard CNP, 3/23/2022, 11:27 AM    An electronic signature was used to authenticate this note. Please note this report has been partially produced using speech recognition software and may contain errors related to that system including errors in grammar, punctuation, and spelling, as well as words and phrases that may be inappropriate. If there are any questions or concerns please feel free to contact the dictating provider for clarification.

## 2022-04-01 RX ORDER — MAGNESIUM OXIDE 400 MG/1
TABLET ORAL
Qty: 30 TABLET | Refills: 1 | OUTPATIENT
Start: 2022-04-01

## 2022-04-25 DIAGNOSIS — K21.9 GASTROESOPHAGEAL REFLUX DISEASE WITHOUT ESOPHAGITIS: ICD-10-CM

## 2022-04-25 RX ORDER — FAMOTIDINE 20 MG
TABLET ORAL
Qty: 180 TABLET | Refills: 2 | Status: SHIPPED | OUTPATIENT
Start: 2022-04-25

## 2022-04-25 RX ORDER — LANOLIN ALCOHOL/MO/W.PET/CERES
400 CREAM (GRAM) TOPICAL DAILY
Qty: 30 TABLET | Refills: 5 | Status: SHIPPED | OUTPATIENT
Start: 2022-04-25 | End: 2022-10-25

## 2022-09-27 ENCOUNTER — NURSE ONLY (OUTPATIENT)
Dept: INTERNAL MEDICINE CLINIC | Age: 72
End: 2022-09-27
Payer: MEDICARE

## 2022-09-27 DIAGNOSIS — R94.4 DECREASED GFR: Primary | ICD-10-CM

## 2022-09-27 DIAGNOSIS — R06.09 DOE (DYSPNEA ON EXERTION): ICD-10-CM

## 2022-09-27 LAB
ANION GAP SERPL CALCULATED.3IONS-SCNC: 13 MMOL/L (ref 3–16)
BUN BLDV-MCNC: 14 MG/DL (ref 7–20)
CALCIUM SERPL-MCNC: 9.1 MG/DL (ref 8.3–10.6)
CHLORIDE BLD-SCNC: 107 MMOL/L (ref 99–110)
CO2: 24 MMOL/L (ref 21–32)
CREAT SERPL-MCNC: 1.1 MG/DL (ref 0.8–1.3)
GFR AFRICAN AMERICAN: >60
GFR NON-AFRICAN AMERICAN: >60
GLUCOSE BLD-MCNC: 91 MG/DL (ref 70–99)
POTASSIUM SERPL-SCNC: 4.4 MMOL/L (ref 3.5–5.1)
SODIUM BLD-SCNC: 144 MMOL/L (ref 136–145)

## 2022-09-27 PROCEDURE — 36415 COLL VENOUS BLD VENIPUNCTURE: CPT | Performed by: FAMILY MEDICINE

## 2022-09-27 PROCEDURE — 99999 PR OFFICE/OUTPT VISIT,PROCEDURE ONLY: CPT | Performed by: FAMILY MEDICINE

## 2022-10-10 ENCOUNTER — OFFICE VISIT (OUTPATIENT)
Dept: CARDIOLOGY CLINIC | Age: 72
End: 2022-10-10
Payer: MEDICARE

## 2022-10-10 VITALS
HEART RATE: 96 BPM | WEIGHT: 206.6 LBS | DIASTOLIC BLOOD PRESSURE: 68 MMHG | RESPIRATION RATE: 18 BRPM | BODY MASS INDEX: 31.41 KG/M2 | SYSTOLIC BLOOD PRESSURE: 112 MMHG

## 2022-10-10 DIAGNOSIS — I71.21 ANEURYSM OF ASCENDING AORTA WITHOUT RUPTURE: Primary | ICD-10-CM

## 2022-10-10 PROCEDURE — G8484 FLU IMMUNIZE NO ADMIN: HCPCS | Performed by: INTERNAL MEDICINE

## 2022-10-10 PROCEDURE — G8427 DOCREV CUR MEDS BY ELIG CLIN: HCPCS | Performed by: INTERNAL MEDICINE

## 2022-10-10 PROCEDURE — 1123F ACP DISCUSS/DSCN MKR DOCD: CPT | Performed by: INTERNAL MEDICINE

## 2022-10-10 PROCEDURE — 1036F TOBACCO NON-USER: CPT | Performed by: INTERNAL MEDICINE

## 2022-10-10 PROCEDURE — G8417 CALC BMI ABV UP PARAM F/U: HCPCS | Performed by: INTERNAL MEDICINE

## 2022-10-10 PROCEDURE — 99214 OFFICE O/P EST MOD 30 MIN: CPT | Performed by: INTERNAL MEDICINE

## 2022-10-10 PROCEDURE — 3017F COLORECTAL CA SCREEN DOC REV: CPT | Performed by: INTERNAL MEDICINE

## 2022-10-10 NOTE — PROGRESS NOTES
CARDIOLOGY NOTE      10/10/2022    RE: Ricardo Huffman  (1950)                               TO:  Dr. Dara Espinal, DO Nic Tang is a 74 y.o. male who was seen today for management of  Abn ekg                                  For follow-up   HPI:                   Pt has h/o abnormal EKG, GERD seen today for seen by PCP referred here with an abnormal EKG. Had CT chest had 5 cm TAA here for FU has no CP, SOB  Patient was initially referred for some atypical symptoms had a treadmill stress test and then also had a echocardiogram which showed that the patient had dilated aorta we got CT chest showed that the patient has a 5 cm thoracic aortic aneurysm which will be monitored on a 6 monthly basis given that the patient is asymptomatic  Ricardo Huffman has the following history recorded in care path:  Patient Active Problem List    Diagnosis Date Noted    Aneurysm of ascending aorta 01/31/2022    History of temporal arteritis 12/06/2020    Vasomotor rhinitis     Vitamin D deficiency 11/20/2019    Adenomatous colon polyp 11/20/2019    Allergic rhinitis 12/15/2011    GERD  12/15/2011    Lumbar spinal stenosis 12/15/2011     Current Outpatient Medications   Medication Sig Dispense Refill    magnesium oxide (MAG-OX) 400 (240 Mg) MG tablet Take 1 tablet by mouth daily 30 tablet 5    PEPCID 20 MG tablet TAKE 1 TABLET BY MOUTH TWICE A  tablet 2    ipratropium (ATROVENT) 0.03 % nasal spray USE 2 SPRAYS IN EACH NOSTRIL TWICE A DAY 1 Bottle 2    b complex vitamins capsule Take 1 capsule by mouth every morning       aspirin 81 MG tablet Take 81 mg by mouth three times a week       Krill Oil 300 MG CAPS Take 1 capsule by mouth nightly       Cholecalciferol (VITAMIN D3) 2000 UNITS CAPS Take by mouth every morning       ALLEGRA 180 MG tablet Take 1 tablet by mouth daily.  (Patient taking differently: Take 180 mg by mouth nightly) 90 tablet 3     No current facility-administered medications for this visit. Allergies: Famotidine, Fexofenadine, and Ranitidine hcl  Past Medical History:   Diagnosis Date    Allergic rhinitis     Aneurysm of ascending aorta 01/31/2022    ascending aorta(5.2cm). Colon polyp     adenoma    Diverticulosis     GERD (gastroesophageal reflux disease) 07/1996    by UGI    Liver cyst     One undescended testicle     right testicle as a child    Periumbilical hernia     PONV (postoperative nausea and vomiting)     Prostate cancer ([facility] [address] 08/2003    S/P resection    Spinal stenosis, lumbar 11/1994    by MRI    Temporal arteritis ([facility] [address] 06/2014    Vasomotor rhinitis      Past Surgical History:   Procedure Laterality Date    ARTERY BIOPSY  6/2/2014    Temporal artery    COLONOSCOPY  8/20/2013,5/16/2008, 2004    COLONOSCOPY  11/14/2018    6mm sessile residual poluyp in hepatic flexure, grade 2 hemorrhoids    COLONOSCOPY N/A 11/14/2018    COLONOSCOPY CONTROL HEMORRHAGE with eleview injection and polypectomy with application of two hemoclips performed by Sherice Sullivan MD at [address]       right    OTHER SURGICAL HISTORY      pt had abscesses on on and leg removed and drain placed, years ago    [address] [place] [address] Blvd  2/2005    left    SINUS SURGERY      right maxillary    TESTICLE SURGERY      right testicular release      As reviewed   Family History   Problem Relation Age of Onset    Cancer Mother         pancreatic     Cancer Father         prostate    Dementia Father      Social History     Tobacco Use    Smoking status: Never    Smokeless tobacco: Never   Substance Use Topics    Alcohol use: No        Objective:    Vitals:    10/10/22 1204   BP: 112/68   Pulse: 96   Resp: 18   Weight: 206 lb 9.6 oz (93.7 kg)     /68   Pulse 96   Resp 18   Wt 206 lb 9.6 oz (93.7 kg)   BMI 31.41 kg/m²     No flowsheet data found.      Wt Readings from Last 3 Encounters:   10/10/22 206 lb 9.6 oz (93.7 kg)   03/23/22 209 lb (94.8 kg)   03/10/22 211 lb 9.6 oz (96 kg)     Body mass index is 31.41 kg/m². GENERAL - Alert, oriented, pleasant, in no apparent distress. EYES: No jaundice, no conjunctival pallor. SKIN: It is warm & dry. No rashes. No Echhymosis    HEENT - No clinically significant abnormalities seen. Neck - Supple. No jugular venous distention noted. No carotid bruits. Cardiovascular - Normal S1 and S2 without obvious murmur or gallop. Extremities - No cyanosis, clubbing, or significant edema. Pulmonary - No respiratory distress. No wheezes or rales. Abdomen - No masses, tenderness, or organomegaly. Musculoskeletal - No significant edema. No joint deformities. No muscle wasting. Neurologic - Cranial nerves II through XII are grossly intact. There were no gross focal neurologic abnormalities.     Lab Review   Lab Results   Component Value Date/Time    CKTOTAL 75 04/02/2018 10:48 AM     BNP:  No results found for: BNP  PT/INR:  No results found for: INR  Lab Results   Component Value Date    LABA1C 5.2 11/06/2019    LABA1C 5.3 10/16/2018     Lab Results   Component Value Date    WBC 7.1 01/12/2022    HCT 40.5 01/12/2022    MCV 91.5 01/12/2022     01/12/2022     Lab Results   Component Value Date    CHOL 152 11/06/2019    TRIG 80 11/06/2019    HDL 51 11/06/2019    LDLCALC 85 11/06/2019     Lab Results   Component Value Date    ALT <5 (L) 01/13/2022    AST 15 01/13/2022     BMP:    Lab Results   Component Value Date/Time     09/27/2022 10:10 AM    K 4.4 09/27/2022 10:10 AM     09/27/2022 10:10 AM    CO2 24 09/27/2022 10:10 AM    BUN 14 09/27/2022 10:10 AM    CREATININE 1.1 09/27/2022 10:10 AM     CMP:   Lab Results   Component Value Date/Time     09/27/2022 10:10 AM    K 4.4 09/27/2022 10:10 AM     09/27/2022 10:10 AM    CO2 24 09/27/2022 10:10 AM    BUN 14 09/27/2022 10:10 AM    PROT 7.1 01/13/2022 10:23 AM    PROT 6.7 01/27/2012 04:00 PM     TSH:    Lab Results   Component Value Date/Time    TSH 2.07 11/06/2019 08:34 AM    [facility] 0.020 12/23/2010 04:00 PM           Assessment & Plan:    -Abnormal EKG  Patient has an abnormal EKG as mentioned above being referred and has been having exertional dyspnea has no chest pain however  Stress test as follows    Summary    Overall Impression:    Normal Exercise Stress test DUKE Score +5 , low risk    Shortness of breath at early stage of exercise , indicating deconditioning    Rare PVCs noted      -Has a dilated aorta as mentioned in the echo below will need yearly follow-up  I will get a CT chest for further clarification  Guidelines for management of aortic aneurysm are as follows    -Has interatrial septal aneurysm which usually is of no clinical significance unless the patient is any other neurologic findings    -Exertional dyspnea ; echo as follows  Summary   Left ventricular function and size is normal, EF is estimated at 55-60%. Mild left ventricular hypertrophy. Normal diastolic filling pattern for age. No regional wall motion abnormalities were detected. Mildly dilated left atrium. Aneurysmal interatrial septum. Mild mitral and tricuspid regurgitation is present. RVSP is 34 mmHg. Dilation of the aortic root (3.7cm)and the ascending aorta(5.2cm). No evidence of pericardial effusion. yearly check on aorta      Signature      ------------------------------------------------------------------   Electronically signed by Diana Roy MD   (Interpreting physician) on 01/31/2022 at 09:40 AM        Megan Robertson MD    [facility]    Ascending aortic aneurysm measuring 5 cm. No evidence of dissection or   other acute abnormality. No evidence of occlusive disease. 2. No obvious aortic valve calcification. No evidence of mediastinal   lymphadenopathy.    3. No active lung parenchyma or pleural disease   Re ct in 6 mos

## 2022-10-26 RX ORDER — LANOLIN ALCOHOL/MO/W.PET/CERES
400 CREAM (GRAM) TOPICAL DAILY
Qty: 90 TABLET | Refills: 1 | Status: SHIPPED | OUTPATIENT
Start: 2022-10-26

## 2022-11-23 ENCOUNTER — HOSPITAL ENCOUNTER (OUTPATIENT)
Facility: HOSPITAL | Age: 72
Setting detail: OBSERVATION
Discharge: HOME | End: 2022-11-24
Attending: EMERGENCY MEDICINE | Admitting: EMERGENCY MEDICINE

## 2022-11-23 ENCOUNTER — APPOINTMENT (OUTPATIENT)
Dept: CT IMAGING | Facility: HOSPITAL | Age: 72
End: 2022-11-23

## 2022-11-23 DIAGNOSIS — R10.9 RIGHT FLANK PAIN: Primary | ICD-10-CM

## 2022-11-23 DIAGNOSIS — R31.1 BENIGN ESSENTIAL MICROSCOPIC HEMATURIA: ICD-10-CM

## 2022-11-23 DIAGNOSIS — K76.89 LIVER CYST: ICD-10-CM

## 2022-11-23 LAB
ALBUMIN SERPL-MCNC: 4.7 G/DL (ref 3.5–5.2)
ALBUMIN/GLOB SERPL: 1.7 G/DL
ALP SERPL-CCNC: 94 U/L (ref 39–117)
ALT SERPL W P-5'-P-CCNC: 6 U/L (ref 1–41)
ANION GAP SERPL CALCULATED.3IONS-SCNC: 11 MMOL/L (ref 5–15)
AST SERPL-CCNC: 18 U/L (ref 1–40)
BACTERIA UR QL AUTO: ABNORMAL /HPF
BASOPHILS # BLD AUTO: 0.07 10*3/MM3 (ref 0–0.2)
BASOPHILS NFR BLD AUTO: 0.6 % (ref 0–1.5)
BILIRUB SERPL-MCNC: 0.9 MG/DL (ref 0–1.2)
BILIRUB UR QL STRIP: NEGATIVE
BUN SERPL-MCNC: 12 MG/DL (ref 8–23)
BUN/CREAT SERPL: 8.5 (ref 7–25)
CALCIUM SPEC-SCNC: 9.8 MG/DL (ref 8.6–10.5)
CHLORIDE SERPL-SCNC: 102 MMOL/L (ref 98–107)
CLARITY UR: CLEAR
CO2 SERPL-SCNC: 27 MMOL/L (ref 22–29)
COLOR UR: YELLOW
CREAT SERPL-MCNC: 1.41 MG/DL (ref 0.76–1.27)
D-LACTATE SERPL-SCNC: 1.6 MMOL/L (ref 0.5–2)
DEPRECATED RDW RBC AUTO: 39.8 FL (ref 37–54)
EGFRCR SERPLBLD CKD-EPI 2021: 52.3 ML/MIN/1.73
EOSINOPHIL # BLD AUTO: 0.28 10*3/MM3 (ref 0–0.4)
EOSINOPHIL NFR BLD AUTO: 2.5 % (ref 0.3–6.2)
ERYTHROCYTE [DISTWIDTH] IN BLOOD BY AUTOMATED COUNT: 11.8 % (ref 12.3–15.4)
GLOBULIN UR ELPH-MCNC: 2.7 GM/DL
GLUCOSE SERPL-MCNC: 149 MG/DL (ref 65–99)
GLUCOSE UR STRIP-MCNC: NEGATIVE MG/DL
HAV IGM SERPL QL IA: NORMAL
HBV CORE IGM SERPL QL IA: NORMAL
HBV SURFACE AG SERPL QL IA: NORMAL
HCT VFR BLD AUTO: 41.1 % (ref 37.5–51)
HCV AB SER DONR QL: NORMAL
HGB BLD-MCNC: 14.1 G/DL (ref 13–17.7)
HGB UR QL STRIP.AUTO: ABNORMAL
HOLD SPECIMEN: NORMAL
HOLD SPECIMEN: NORMAL
HYALINE CASTS UR QL AUTO: ABNORMAL /LPF
IMM GRANULOCYTES # BLD AUTO: 0.04 10*3/MM3 (ref 0–0.05)
IMM GRANULOCYTES NFR BLD AUTO: 0.4 % (ref 0–0.5)
KETONES UR QL STRIP: ABNORMAL
LEUKOCYTE ESTERASE UR QL STRIP.AUTO: NEGATIVE
LIPASE SERPL-CCNC: 13 U/L (ref 13–60)
LYMPHOCYTES # BLD AUTO: 1.63 10*3/MM3 (ref 0.7–3.1)
LYMPHOCYTES NFR BLD AUTO: 14.7 % (ref 19.6–45.3)
MCH RBC QN AUTO: 30.9 PG (ref 26.6–33)
MCHC RBC AUTO-ENTMCNC: 34.3 G/DL (ref 31.5–35.7)
MCV RBC AUTO: 90.1 FL (ref 79–97)
MONOCYTES # BLD AUTO: 0.93 10*3/MM3 (ref 0.1–0.9)
MONOCYTES NFR BLD AUTO: 8.4 % (ref 5–12)
NEUTROPHILS NFR BLD AUTO: 73.4 % (ref 42.7–76)
NEUTROPHILS NFR BLD AUTO: 8.15 10*3/MM3 (ref 1.7–7)
NITRITE UR QL STRIP: NEGATIVE
NRBC BLD AUTO-RTO: 0 /100 WBC (ref 0–0.2)
PH UR STRIP.AUTO: 5.5 [PH] (ref 5–8)
PLATELET # BLD AUTO: 215 10*3/MM3 (ref 140–450)
PMV BLD AUTO: 9.3 FL (ref 6–12)
POTASSIUM SERPL-SCNC: 4.6 MMOL/L (ref 3.5–5.2)
PROT SERPL-MCNC: 7.4 G/DL (ref 6–8.5)
PROT UR QL STRIP: NEGATIVE
RBC # BLD AUTO: 4.56 10*6/MM3 (ref 4.14–5.8)
RBC # UR STRIP: ABNORMAL /HPF
REF LAB TEST METHOD: ABNORMAL
SODIUM SERPL-SCNC: 140 MMOL/L (ref 136–145)
SP GR UR STRIP: 1.01 (ref 1–1.03)
SQUAMOUS #/AREA URNS HPF: ABNORMAL /HPF
UROBILINOGEN UR QL STRIP: ABNORMAL
WBC # UR STRIP: ABNORMAL /HPF
WBC NRBC COR # BLD: 11.1 10*3/MM3 (ref 3.4–10.8)
WHOLE BLOOD HOLD COAG: NORMAL
WHOLE BLOOD HOLD SPECIMEN: NORMAL

## 2022-11-23 PROCEDURE — G0378 HOSPITAL OBSERVATION PER HR: HCPCS

## 2022-11-23 PROCEDURE — 80053 COMPREHEN METABOLIC PANEL: CPT | Performed by: EMERGENCY MEDICINE

## 2022-11-23 PROCEDURE — 25010000002 MORPHINE PER 10 MG: Performed by: NURSE PRACTITIONER

## 2022-11-23 PROCEDURE — 96361 HYDRATE IV INFUSION ADD-ON: CPT

## 2022-11-23 PROCEDURE — 81001 URINALYSIS AUTO W/SCOPE: CPT | Performed by: EMERGENCY MEDICINE

## 2022-11-23 PROCEDURE — 85025 COMPLETE CBC W/AUTO DIFF WBC: CPT | Performed by: EMERGENCY MEDICINE

## 2022-11-23 PROCEDURE — 83605 ASSAY OF LACTIC ACID: CPT | Performed by: EMERGENCY MEDICINE

## 2022-11-23 PROCEDURE — 25010000002 KETOROLAC TROMETHAMINE PER 15 MG: Performed by: NURSE PRACTITIONER

## 2022-11-23 PROCEDURE — 63710000001 ONDANSETRON ODT 4 MG TABLET DISPERSIBLE: Performed by: NURSE PRACTITIONER

## 2022-11-23 PROCEDURE — 74176 CT ABD & PELVIS W/O CONTRAST: CPT

## 2022-11-23 PROCEDURE — 83690 ASSAY OF LIPASE: CPT | Performed by: EMERGENCY MEDICINE

## 2022-11-23 PROCEDURE — 99284 EMERGENCY DEPT VISIT MOD MDM: CPT

## 2022-11-23 PROCEDURE — 96375 TX/PRO/DX INJ NEW DRUG ADDON: CPT

## 2022-11-23 PROCEDURE — 80074 ACUTE HEPATITIS PANEL: CPT | Performed by: NURSE PRACTITIONER

## 2022-11-23 PROCEDURE — 96374 THER/PROPH/DIAG INJ IV PUSH: CPT

## 2022-11-23 PROCEDURE — 36415 COLL VENOUS BLD VENIPUNCTURE: CPT | Performed by: EMERGENCY MEDICINE

## 2022-11-23 RX ORDER — ASPIRIN 81 MG/1
81 TABLET, CHEWABLE ORAL NIGHTLY
COMMUNITY

## 2022-11-23 RX ORDER — NALOXONE HCL 0.4 MG/ML
0.4 VIAL (ML) INJECTION
Status: DISCONTINUED | OUTPATIENT
Start: 2022-11-23 | End: 2022-11-24 | Stop reason: HOSPADM

## 2022-11-23 RX ORDER — SODIUM CHLORIDE 0.9 % (FLUSH) 0.9 %
10 SYRINGE (ML) INJECTION AS NEEDED
Status: DISCONTINUED | OUTPATIENT
Start: 2022-11-23 | End: 2022-11-24 | Stop reason: HOSPADM

## 2022-11-23 RX ORDER — KETOROLAC TROMETHAMINE 15 MG/ML
15 INJECTION, SOLUTION INTRAMUSCULAR; INTRAVENOUS ONCE
Status: COMPLETED | OUTPATIENT
Start: 2022-11-23 | End: 2022-11-23

## 2022-11-23 RX ORDER — MORPHINE SULFATE 2 MG/ML
2 INJECTION, SOLUTION INTRAMUSCULAR; INTRAVENOUS EVERY 4 HOURS PRN
Status: DISCONTINUED | OUTPATIENT
Start: 2022-11-23 | End: 2022-11-24 | Stop reason: HOSPADM

## 2022-11-23 RX ORDER — ONDANSETRON 4 MG/1
4 TABLET, FILM COATED ORAL EVERY 6 HOURS PRN
Status: DISCONTINUED | OUTPATIENT
Start: 2022-11-23 | End: 2022-11-24 | Stop reason: HOSPADM

## 2022-11-23 RX ORDER — MORPHINE SULFATE 2 MG/ML
4 INJECTION, SOLUTION INTRAMUSCULAR; INTRAVENOUS ONCE
Status: COMPLETED | OUTPATIENT
Start: 2022-11-23 | End: 2022-11-23

## 2022-11-23 RX ORDER — ONDANSETRON 4 MG/1
4 TABLET, ORALLY DISINTEGRATING ORAL ONCE
Status: COMPLETED | OUTPATIENT
Start: 2022-11-23 | End: 2022-11-23

## 2022-11-23 RX ORDER — FEXOFENADINE HCL 180 MG/1
180 TABLET ORAL NIGHTLY
COMMUNITY

## 2022-11-23 RX ORDER — CHOLECALCIFEROL (VITAMIN D3) 125 MCG
5 CAPSULE ORAL NIGHTLY PRN
Status: DISCONTINUED | OUTPATIENT
Start: 2022-11-23 | End: 2022-11-24 | Stop reason: HOSPADM

## 2022-11-23 RX ORDER — SODIUM CHLORIDE 9 MG/ML
100 INJECTION, SOLUTION INTRAVENOUS CONTINUOUS
Status: DISCONTINUED | OUTPATIENT
Start: 2022-11-23 | End: 2022-11-24 | Stop reason: HOSPADM

## 2022-11-23 RX ORDER — HYDROCODONE BITARTRATE AND ACETAMINOPHEN 5; 325 MG/1; MG/1
1 TABLET ORAL EVERY 4 HOURS PRN
Status: DISCONTINUED | OUTPATIENT
Start: 2022-11-23 | End: 2022-11-24 | Stop reason: HOSPADM

## 2022-11-23 RX ORDER — ONDANSETRON 2 MG/ML
4 INJECTION INTRAMUSCULAR; INTRAVENOUS EVERY 6 HOURS PRN
Status: DISCONTINUED | OUTPATIENT
Start: 2022-11-23 | End: 2022-11-24 | Stop reason: HOSPADM

## 2022-11-23 RX ORDER — FAMOTIDINE 20 MG/1
20 TABLET, FILM COATED ORAL 2 TIMES DAILY
Status: DISCONTINUED | OUTPATIENT
Start: 2022-11-23 | End: 2022-11-24 | Stop reason: HOSPADM

## 2022-11-23 RX ORDER — SODIUM CHLORIDE 0.9 % (FLUSH) 0.9 %
10 SYRINGE (ML) INJECTION EVERY 12 HOURS SCHEDULED
Status: DISCONTINUED | OUTPATIENT
Start: 2022-11-23 | End: 2022-11-24 | Stop reason: HOSPADM

## 2022-11-23 RX ORDER — SODIUM CHLORIDE 9 MG/ML
40 INJECTION, SOLUTION INTRAVENOUS AS NEEDED
Status: DISCONTINUED | OUTPATIENT
Start: 2022-11-23 | End: 2022-11-24 | Stop reason: HOSPADM

## 2022-11-23 RX ORDER — ASPIRIN 81 MG/1
81 TABLET, CHEWABLE ORAL NIGHTLY
Status: DISCONTINUED | OUTPATIENT
Start: 2022-11-23 | End: 2022-11-24 | Stop reason: HOSPADM

## 2022-11-23 RX ORDER — MELATONIN
5000 DAILY
Status: DISCONTINUED | OUTPATIENT
Start: 2022-11-24 | End: 2022-11-24 | Stop reason: HOSPADM

## 2022-11-23 RX ORDER — NITROGLYCERIN 0.4 MG/1
0.4 TABLET SUBLINGUAL
Status: DISCONTINUED | OUTPATIENT
Start: 2022-11-23 | End: 2022-11-24 | Stop reason: HOSPADM

## 2022-11-23 RX ADMIN — SODIUM CHLORIDE 1000 ML: 9 INJECTION, SOLUTION INTRAVENOUS at 18:36

## 2022-11-23 RX ADMIN — ONDANSETRON 4 MG: 4 TABLET, ORALLY DISINTEGRATING ORAL at 18:32

## 2022-11-23 RX ADMIN — Medication 10 ML: at 21:02

## 2022-11-23 RX ADMIN — FAMOTIDINE 20 MG: 20 TABLET ORAL at 22:42

## 2022-11-23 RX ADMIN — MORPHINE SULFATE 4 MG: 2 INJECTION, SOLUTION INTRAMUSCULAR; INTRAVENOUS at 18:32

## 2022-11-23 RX ADMIN — ASPIRIN 81 MG: 81 TABLET, CHEWABLE ORAL at 22:42

## 2022-11-23 RX ADMIN — SODIUM CHLORIDE 100 ML/HR: 9 INJECTION, SOLUTION INTRAVENOUS at 22:42

## 2022-11-23 RX ADMIN — KETOROLAC TROMETHAMINE 15 MG: 15 INJECTION, SOLUTION INTRAMUSCULAR; INTRAVENOUS at 18:32

## 2022-11-24 ENCOUNTER — APPOINTMENT (OUTPATIENT)
Dept: ULTRASOUND IMAGING | Facility: HOSPITAL | Age: 72
End: 2022-11-24

## 2022-11-24 VITALS
BODY MASS INDEX: 30.69 KG/M2 | OXYGEN SATURATION: 97 % | SYSTOLIC BLOOD PRESSURE: 125 MMHG | RESPIRATION RATE: 16 BRPM | TEMPERATURE: 98.2 F | DIASTOLIC BLOOD PRESSURE: 68 MMHG | HEART RATE: 64 BPM | HEIGHT: 68 IN | WEIGHT: 202.5 LBS

## 2022-11-24 LAB
ALBUMIN SERPL-MCNC: 3.4 G/DL (ref 3.5–5.2)
ALBUMIN/GLOB SERPL: 1.5 G/DL
ALP SERPL-CCNC: 67 U/L (ref 39–117)
ALT SERPL W P-5'-P-CCNC: 5 U/L (ref 1–41)
ANION GAP SERPL CALCULATED.3IONS-SCNC: 9 MMOL/L (ref 5–15)
AST SERPL-CCNC: 13 U/L (ref 1–40)
BILIRUB SERPL-MCNC: 0.8 MG/DL (ref 0–1.2)
BUN SERPL-MCNC: 13 MG/DL (ref 8–23)
BUN/CREAT SERPL: 11.5 (ref 7–25)
CALCIUM SPEC-SCNC: 8.4 MG/DL (ref 8.6–10.5)
CHLORIDE SERPL-SCNC: 107 MMOL/L (ref 98–107)
CO2 SERPL-SCNC: 23 MMOL/L (ref 22–29)
CREAT SERPL-MCNC: 1.13 MG/DL (ref 0.76–1.27)
DEPRECATED RDW RBC AUTO: 41.6 FL (ref 37–54)
EGFRCR SERPLBLD CKD-EPI 2021: 68.2 ML/MIN/1.73
ERYTHROCYTE [DISTWIDTH] IN BLOOD BY AUTOMATED COUNT: 12 % (ref 12.3–15.4)
GLOBULIN UR ELPH-MCNC: 2.2 GM/DL
GLUCOSE SERPL-MCNC: 87 MG/DL (ref 65–99)
HCT VFR BLD AUTO: 33.9 % (ref 37.5–51)
HGB BLD-MCNC: 11.4 G/DL (ref 13–17.7)
MCH RBC QN AUTO: 31.3 PG (ref 26.6–33)
MCHC RBC AUTO-ENTMCNC: 33.6 G/DL (ref 31.5–35.7)
MCV RBC AUTO: 93.1 FL (ref 79–97)
PLATELET # BLD AUTO: 165 10*3/MM3 (ref 140–450)
PMV BLD AUTO: 9.6 FL (ref 6–12)
POTASSIUM SERPL-SCNC: 4.1 MMOL/L (ref 3.5–5.2)
PROT SERPL-MCNC: 5.6 G/DL (ref 6–8.5)
RBC # BLD AUTO: 3.64 10*6/MM3 (ref 4.14–5.8)
SODIUM SERPL-SCNC: 139 MMOL/L (ref 136–145)
WBC NRBC COR # BLD: 7.33 10*3/MM3 (ref 3.4–10.8)

## 2022-11-24 PROCEDURE — 85027 COMPLETE CBC AUTOMATED: CPT | Performed by: EMERGENCY MEDICINE

## 2022-11-24 PROCEDURE — 76705 ECHO EXAM OF ABDOMEN: CPT

## 2022-11-24 PROCEDURE — G0378 HOSPITAL OBSERVATION PER HR: HCPCS

## 2022-11-24 PROCEDURE — 80053 COMPREHEN METABOLIC PANEL: CPT | Performed by: EMERGENCY MEDICINE

## 2022-11-24 PROCEDURE — 96361 HYDRATE IV INFUSION ADD-ON: CPT

## 2022-11-24 PROCEDURE — 99203 OFFICE O/P NEW LOW 30 MIN: CPT | Performed by: SURGERY

## 2022-11-24 RX ADMIN — SODIUM CHLORIDE 100 ML/HR: 9 INJECTION, SOLUTION INTRAVENOUS at 08:41

## 2022-11-24 RX ADMIN — Medication 10 ML: at 08:41

## 2023-01-18 ENCOUNTER — OFFICE VISIT (OUTPATIENT)
Dept: INTERNAL MEDICINE CLINIC | Age: 73
End: 2023-01-18

## 2023-01-18 ENCOUNTER — HOSPITAL ENCOUNTER (OUTPATIENT)
Age: 73
Discharge: HOME | End: 2023-01-18
Payer: MEDICARE

## 2023-01-18 ENCOUNTER — HOSPITAL ENCOUNTER (OUTPATIENT)
Dept: GENERAL RADIOLOGY | Age: 73
Discharge: HOME | End: 2023-01-18
Payer: MEDICARE

## 2023-01-18 VITALS
DIASTOLIC BLOOD PRESSURE: 80 MMHG | WEIGHT: 208.2 LBS | OXYGEN SATURATION: 96 % | SYSTOLIC BLOOD PRESSURE: 114 MMHG | HEART RATE: 75 BPM | BODY MASS INDEX: 31.55 KG/M2 | HEIGHT: 68 IN

## 2023-01-18 DIAGNOSIS — I71.20 THORACIC AORTIC ANEURYSM WITHOUT RUPTURE, UNSPECIFIED PART: Primary | ICD-10-CM

## 2023-01-18 DIAGNOSIS — R35.1 NOCTURIA: ICD-10-CM

## 2023-01-18 DIAGNOSIS — M25.561 RIGHT KNEE PAIN, UNSPECIFIED CHRONICITY: ICD-10-CM

## 2023-01-18 DIAGNOSIS — J30.0 VASOMOTOR RHINITIS: ICD-10-CM

## 2023-01-18 DIAGNOSIS — Z79.899 LONG TERM USE OF DRUG: ICD-10-CM

## 2023-01-18 DIAGNOSIS — E55.9 VITAMIN D DEFICIENCY: ICD-10-CM

## 2023-01-18 DIAGNOSIS — K21.9 GASTROESOPHAGEAL REFLUX DISEASE WITHOUT ESOPHAGITIS: ICD-10-CM

## 2023-01-18 DIAGNOSIS — K76.89 LIVER CYST: ICD-10-CM

## 2023-01-18 DIAGNOSIS — D64.9 ANEMIA, UNSPECIFIED TYPE: ICD-10-CM

## 2023-01-18 PROBLEM — I71.40 ABDOMINAL AORTIC ANEURYSM (AAA) WITHOUT RUPTURE, UNSPECIFIED PART (HCC): Status: ACTIVE | Noted: 2023-01-18

## 2023-01-18 LAB
A/G RATIO: 1.9 (ref 1.1–2.2)
ALBUMIN SERPL-MCNC: 4.5 G/DL (ref 3.4–5)
ALP BLD-CCNC: 90 U/L (ref 40–129)
ALT SERPL-CCNC: 7 U/L (ref 10–40)
ANION GAP SERPL CALCULATED.3IONS-SCNC: 12 MMOL/L (ref 3–16)
AST SERPL-CCNC: 21 U/L (ref 15–37)
BASOPHILS ABSOLUTE: 0.1 K/UL (ref 0–0.2)
BASOPHILS RELATIVE PERCENT: 0.9 %
BILIRUB SERPL-MCNC: 0.8 MG/DL (ref 0–1)
BUN BLDV-MCNC: 17 MG/DL (ref 7–20)
CALCIUM SERPL-MCNC: 9.5 MG/DL (ref 8.3–10.6)
CHLORIDE BLD-SCNC: 101 MMOL/L (ref 99–110)
CO2: 25 MMOL/L (ref 21–32)
CREAT SERPL-MCNC: 1.2 MG/DL (ref 0.8–1.3)
EOSINOPHILS ABSOLUTE: 0.3 K/UL (ref 0–0.6)
EOSINOPHILS RELATIVE PERCENT: 5.6 %
FERRITIN: 270.7 NG/ML (ref 30–400)
GFR SERPL CREATININE-BSD FRML MDRD: >60 ML/MIN/{1.73_M2}
GLUCOSE BLD-MCNC: 94 MG/DL (ref 70–99)
HCT VFR BLD CALC: 41.7 % (ref 40.5–52.5)
HEMOGLOBIN: 13.7 G/DL (ref 13.5–17.5)
IRON SATURATION: 38 % (ref 20–50)
IRON: 124 UG/DL (ref 59–158)
LYMPHOCYTES ABSOLUTE: 1.9 K/UL (ref 1–5.1)
LYMPHOCYTES RELATIVE PERCENT: 32.6 %
MCH RBC QN AUTO: 30.4 PG (ref 26–34)
MCHC RBC AUTO-ENTMCNC: 33 G/DL (ref 31–36)
MCV RBC AUTO: 92.3 FL (ref 80–100)
MONOCYTES ABSOLUTE: 0.5 K/UL (ref 0–1.3)
MONOCYTES RELATIVE PERCENT: 8.4 %
NEUTROPHILS ABSOLUTE: 3 K/UL (ref 1.7–7.7)
NEUTROPHILS RELATIVE PERCENT: 52.5 %
PDW BLD-RTO: 12.8 % (ref 12.4–15.4)
PLATELET # BLD: 219 K/UL (ref 135–450)
PMV BLD AUTO: 8.3 FL (ref 5–10.5)
POTASSIUM SERPL-SCNC: 4.2 MMOL/L (ref 3.5–5.1)
PROSTATE SPECIFIC ANTIGEN: <0.01 NG/ML (ref 0–4)
RBC # BLD: 4.51 M/UL (ref 4.2–5.9)
SEDIMENTATION RATE, ERYTHROCYTE: 14 MM/HR (ref 0–20)
SODIUM BLD-SCNC: 138 MMOL/L (ref 136–145)
TOTAL IRON BINDING CAPACITY: 330 UG/DL (ref 260–445)
TOTAL PROTEIN: 6.9 G/DL (ref 6.4–8.2)
VITAMIN D 25-HYDROXY: 35.1 NG/ML
WBC # BLD: 5.8 K/UL (ref 4–11)

## 2023-01-18 PROCEDURE — 73562 X-RAY EXAM OF KNEE 3: CPT

## 2023-01-18 RX ORDER — IPRATROPIUM BROMIDE 21 UG/1
SPRAY, METERED NASAL
Qty: 1 EACH | Refills: 5 | Status: SHIPPED | OUTPATIENT
Start: 2023-01-18

## 2023-01-18 RX ORDER — PYRAZINAMIDE 500 MG/1
1 TABLET ORAL EVERY 6 HOURS PRN
Qty: 12 TABLET | Refills: 0 | Status: SHIPPED | OUTPATIENT
Start: 2023-01-18 | End: 2023-01-21

## 2023-01-18 SDOH — ECONOMIC STABILITY: FOOD INSECURITY: WITHIN THE PAST 12 MONTHS, THE FOOD YOU BOUGHT JUST DIDN'T LAST AND YOU DIDN'T HAVE MONEY TO GET MORE.: NEVER TRUE

## 2023-01-18 SDOH — ECONOMIC STABILITY: FOOD INSECURITY: WITHIN THE PAST 12 MONTHS, YOU WORRIED THAT YOUR FOOD WOULD RUN OUT BEFORE YOU GOT MONEY TO BUY MORE.: NEVER TRUE

## 2023-01-18 ASSESSMENT — ENCOUNTER SYMPTOMS
SHORTNESS OF BREATH: 0
COUGH: 0
ABDOMINAL PAIN: 0
CONSTIPATION: 0
BLOOD IN STOOL: 0
NAUSEA: 0
BACK PAIN: 0
DIARRHEA: 0

## 2023-01-18 ASSESSMENT — PATIENT HEALTH QUESTIONNAIRE - PHQ9
2. FEELING DOWN, DEPRESSED OR HOPELESS: 0
SUM OF ALL RESPONSES TO PHQ QUESTIONS 1-9: 0
1. LITTLE INTEREST OR PLEASURE IN DOING THINGS: 0
SUM OF ALL RESPONSES TO PHQ QUESTIONS 1-9: 0
SUM OF ALL RESPONSES TO PHQ QUESTIONS 1-9: 0
SUM OF ALL RESPONSES TO PHQ9 QUESTIONS 1 & 2: 0
SUM OF ALL RESPONSES TO PHQ QUESTIONS 1-9: 0

## 2023-01-18 ASSESSMENT — SOCIAL DETERMINANTS OF HEALTH (SDOH): HOW HARD IS IT FOR YOU TO PAY FOR THE VERY BASICS LIKE FOOD, HOUSING, MEDICAL CARE, AND HEATING?: NOT HARD AT ALL

## 2023-01-18 NOTE — PROGRESS NOTES
Well Adult Note  Name: Sharla Armas Date: 2023   MRN: [id number] Sex: Male   Age: 74 y.o. Ethnicity: Non- / Non    : 1950 Race: White (non-)      Linda Melgar is here for well adult exam.  History:  Patient Active Problem List    Diagnosis Date Noted    Abdominal aortic aneurysm (AAA) without rupture, unspecified part 2023    Aneurysm of ascending aorta 2022    History of temporal arteritis 2020    Vasomotor rhinitis     Vitamin D deficiency 2019    Adenomatous colon polyp 2019    Allergic rhinitis 12/15/2011    GERD  12/15/2011    Lumbar spinal stenosis 12/15/2011     Thoracic AA - recent CT imaging([facility])  Large liver simple cyst increased to 13.3 cm on imaging out of the area. He was told no intervention needed at this time  Anemia on lab [place] [facility]  GERD- on Pepcid  Vitamin D deficiency  DDD- lumbar- mod to severe  R knee flare up. Inner medial knee. Nocturia. No dysuria. Hx of prostate cancer  Past history of temporal arteritis     Review of Systems   Constitutional:  Negative for diaphoresis and fever. HENT:  Positive for rhinorrhea. Negative for ear pain, sinus pressure and sore throat. Eyes:  Negative for visual disturbance. Respiratory:  Negative for cough and shortness of breath. Cardiovascular:  Negative for chest pain and palpitations. Gastrointestinal:  Negative for abdominal pain, blood in stool, constipation, diarrhea and nausea. Genitourinary:  Positive for frequency (nocturia). Negative for dysuria. Musculoskeletal:  Positive for arthralgias. Negative for back pain. Neurological:  Negative for dizziness, light-headedness and headaches. Psychiatric/Behavioral:  Negative for dysphoric mood.       Allergies   Allergen Reactions    Famotidine      Rash    Fexofenadine      Skin itches/feels prickly    Ranitidine Hcl      Swelling         Prior to Visit Medications    Medication Sig Taking? Authorizing Provider   ipratropium (ATROVENT) 0.03 % nasal spray USE 2 SPRAYS IN EACH NOSTRIL TWICE A DAY Yes Paulina Horton,    magnesium oxide (MAG-OX) 400 (240 Mg) MG tablet Take 1 tablet by mouth daily Yes Lacy Perez MD   PEPCID 20 MG tablet TAKE 1 TABLET BY MOUTH TWICE A DAY Yes Oscar Phelps, DO   b complex vitamins capsule Take 1 capsule by mouth every morning  Yes Historical Provider, MD   aspirin 81 MG tablet Take 81 mg by mouth three times a week  Yes Historical Provider, MD Alcala Belsano Oil 300 MG CAPS Take 1 capsule by mouth nightly  Yes Historical Provider, MD   Cholecalciferol (VITAMIN D3) 2000 UNITS CAPS Take by mouth every morning  Yes Historical Provider, MD   ALLEGRA 180 MG tablet Take 1 tablet by mouth daily. Patient taking differently: Take 180 mg by mouth nightly Yes Baldo Kawasaki, MD         Past Medical History:   Diagnosis Date    Allergic rhinitis     Aneurysm of ascending aorta 01/31/2022    ascending aorta(5.2cm).     Colon polyp     adenoma    Diverticulosis     GERD (gastroesophageal reflux disease) 07/1996    by UGI    Liver cyst     One undescended testicle     right testicle as a child    Periumbilical hernia     PONV (postoperative nausea and vomiting)     Prostate cancer ([facility] [address] 08/2003    S/P resection    Spinal stenosis, lumbar 11/1994    by MRI    Temporal arteritis ([facility] [address] 06/2014    Vasomotor rhinitis        Past Surgical History:   Procedure Laterality Date    ARTERY BIOPSY  6/2/2014    Temporal artery    COLONOSCOPY  8/20/2013,5/16/2008, 2004    COLONOSCOPY  11/14/2018    6mm sessile residual poluyp in hepatic flexure, grade 2 hemorrhoids    COLONOSCOPY N/A 11/14/2018    COLONOSCOPY CONTROL HEMORRHAGE with eleview injection and polypectomy with application of two hemoclips performed by Jennyfer Denis MD at [address]       right    OTHER SURGICAL HISTORY      pt had abscesses on on and leg removed and drain placed, years ago    PROSTATECTOMY ROTATOR CUFF REPAIR  2/2005    left    SINUS SURGERY      right maxillary    TESTICLE SURGERY      right testicular release         Family History   Problem Relation Age of Onset    Cancer Mother         pancreatic     Cancer Father         prostate    Dementia Father        Social History     Tobacco Use    Smoking status: Never    Smokeless tobacco: Never   Vaping Use    Vaping Use: Never used   Substance Use Topics    Alcohol use: No    Drug use: No       Objective   /80 (Site: Left Upper Arm, Position: Sitting, Cuff Size: Medium Adult)   Pulse 75   Ht 5' 8\" (1.727 m)   Wt 208 lb 3.2 oz (94.4 kg)   SpO2 96%   BMI 31.66 kg/m²   Wt Readings from Last 3 Encounters:   01/18/23 208 lb 3.2 oz (94.4 kg)   10/10/22 206 lb 9.6 oz (93.7 kg)   03/23/22 209 lb (94.8 kg)       Physical Exam  Vitals reviewed. Constitutional:       General: He is not in acute distress. HENT:      Right Ear: Tympanic membrane normal.      Left Ear: Tympanic membrane normal.   Eyes:      General: No scleral icterus. Extraocular Movements: Extraocular movements intact. Cardiovascular:      Rate and Rhythm: Normal rate and regular rhythm. Pulmonary:      Effort: Pulmonary effort is normal. No respiratory distress. Breath sounds: Normal breath sounds. Abdominal:      Palpations: Abdomen is soft. Tenderness: There is no abdominal tenderness. Musculoskeletal:         General: Tenderness (discomfort R knee) present. No swelling. Cervical back: Neck supple. Right lower leg: No edema. Left lower leg: No edema. Skin:     Findings: No erythema or rash. Neurological:      Mental Status: He is alert and oriented to person, place, and time. Mental status is at baseline. Psychiatric:         Mood and Affect: Mood normal.     11/23/22 CT Abdomen Pelvis Stone Protocol  Impression      1. No renal stones or hydronephrosis.    2.  Hepatic cysts, including a dominant large right hepatic cyst   measuring up to 13.3 cm.   3.  Multilevel degenerative disc disease, which would be better assessed   with CT or MRI, if clinically indicated. 4.  Partially imaged aneurysmal dilatation of the thoracic aorta. Recommend comparison with prior imaging. Assessment   Plan   1. Thoracic aortic aneurysm without rupture, unspecified part  Patient to follow up with cardiology    2. Right knee pain, unspecified chronicity  - XR KNEE RIGHT (3 VIEWS); Future  - Sedimentation Rate  -Start:  - acetaminophen-codeine (TYLENOL/CODEINE #3) 300-30 MG per tablet; Take 1 tablet by mouth every 6 hours as needed for Pain for up to 3 days. Intended supply: 3 days. Take lowest dose possible to manage pain Max Daily Amount: 4 tablets  Dispense: 12 tablet; Refill: 0  ADR's explained  Oarrs checked    3. Vasomotor rhinitis  - ipratropium (ATROVENT) 0.03 % nasal spray; USE 2 SPRAYS IN EACH NOSTRIL TWICE A DAY  Dispense: 1 each; Refill: 5    4. Anemia, unspecified type  - CBC with Auto Differential  - Comprehensive Metabolic Panel  - Ferritin  - Iron and TIBC    5. Nocturia  Hx of prostate cancer  - PSA, Prostatic Specific Antigen    6. Liver cyst  Patient will monitor for symptoms    7. Vitamin D deficiency  - Vitamin D 25 Hydroxy    8. Gastroesophageal reflux disease without esophagitis  On brand Pepcid    9.  Long term use of drug  - CBC with Auto Differential  - Comprehensive Metabolic Panel    Personalized Preventive Plan   Current Health Maintenance Status  Immunization History   Administered Date(s) Administered    COVID-19, MODERNA [place] border, Primary or Immunocompromised, (age 12y+), IM, 100 mcg/0.5mL 02/02/2021, 03/02/2021    COVID-19, MODERNA Booster BLUE border, (age 18y+), IM, 50mcg/0.25mL 12/15/2021    Influenza Virus Vaccine 10/16/2013    Influenza, FLUAD, (age 72 y+), Adjuvanted, 0.5mL 11/30/2020    Influenza, High Dose (Fluzone 65 yrs and older) 10/29/2014, 11/05/2015, 10/09/2017, 10/16/2018    Influenza, Triv, inactivated, subunit, adjuvanted, IM (Fluad 65 yrs and older) 11/04/2019    Pneumococcal Conjugate 13-valent ([id number]) 09/25/2017    Pneumococcal Polysaccharide ([id number]) 05/13/2014    Td, unspecified formulation 11/01/2007    Tdap (Boostrix, Adacel) 03/02/2019    Zoster Recombinant (Shingrix) 11/19/2019, 06/03/2020        Health Maintenance   Topic Date Due    Annual Wellness Visit (AWV)  Never done    COVID-19 Vaccine (4 - Booster for Moderna series) 02/09/2022    Flu vaccine (1) 08/01/2022    Depression Screen  01/18/2024    Prostate Specific Antigen (PSA) Screening or Monitoring  01/18/2024    Lipids  11/06/2024    Colorectal Cancer Screen  11/14/2028    DTaP/Tdap/Td vaccine (2 - Td or Tdap) 03/02/2029    Shingles vaccine  Completed    Pneumococcal 65+ years Vaccine  Completed    Hepatitis C screen  Completed    Hepatitis A vaccine  Aged Out    Hib vaccine  Aged Out    Meningococcal (ACWY) vaccine  Aged Out     Recommendations for [employer] Due: see orders and patient instructions/AVS.    Return in about 1 year (around 1/18/2024) for physical.  On this date, 1/18/23, I spent 30 minutes reviewing previous notes and test results as well as a face to face encounter discussing diagnosis and treatment plan and documenting the day of the visit

## 2023-01-25 ASSESSMENT — ENCOUNTER SYMPTOMS
RHINORRHEA: 1
SINUS PRESSURE: 0
SORE THROAT: 0

## 2023-04-18 DIAGNOSIS — K21.9 GASTROESOPHAGEAL REFLUX DISEASE WITHOUT ESOPHAGITIS: ICD-10-CM

## 2023-04-18 RX ORDER — FAMOTIDINE 20 MG
TABLET ORAL
Qty: 180 TABLET | Refills: 2 | Status: SHIPPED | OUTPATIENT
Start: 2023-04-18

## 2023-04-28 ENCOUNTER — OFFICE VISIT (OUTPATIENT)
Dept: CARDIOLOGY CLINIC | Age: 73
End: 2023-04-28
Payer: MEDICARE

## 2023-04-28 VITALS
WEIGHT: 211 LBS | HEART RATE: 71 BPM | HEIGHT: 68 IN | DIASTOLIC BLOOD PRESSURE: 64 MMHG | SYSTOLIC BLOOD PRESSURE: 122 MMHG | BODY MASS INDEX: 31.98 KG/M2

## 2023-04-28 DIAGNOSIS — I71.21 ANEURYSM OF ASCENDING AORTA WITHOUT RUPTURE (HCC): Primary | ICD-10-CM

## 2023-04-28 DIAGNOSIS — R06.09 DOE (DYSPNEA ON EXERTION): ICD-10-CM

## 2023-04-28 PROCEDURE — 93000 ELECTROCARDIOGRAM COMPLETE: CPT | Performed by: NURSE PRACTITIONER

## 2023-04-28 PROCEDURE — 1123F ACP DISCUSS/DSCN MKR DOCD: CPT | Performed by: NURSE PRACTITIONER

## 2023-04-28 PROCEDURE — G8417 CALC BMI ABV UP PARAM F/U: HCPCS | Performed by: NURSE PRACTITIONER

## 2023-04-28 PROCEDURE — 99213 OFFICE O/P EST LOW 20 MIN: CPT | Performed by: NURSE PRACTITIONER

## 2023-04-28 PROCEDURE — G8427 DOCREV CUR MEDS BY ELIG CLIN: HCPCS | Performed by: NURSE PRACTITIONER

## 2023-04-28 PROCEDURE — 3017F COLORECTAL CA SCREEN DOC REV: CPT | Performed by: NURSE PRACTITIONER

## 2023-04-28 PROCEDURE — 1036F TOBACCO NON-USER: CPT | Performed by: NURSE PRACTITIONER

## 2023-04-28 RX ORDER — VIT C/B6/B5/MAGNESIUM/HERB 173 50-5-6-5MG
CAPSULE ORAL
COMMUNITY

## 2023-04-28 ASSESSMENT — ENCOUNTER SYMPTOMS
SHORTNESS OF BREATH: 0
ORTHOPNEA: 0

## 2023-04-28 NOTE — PROGRESS NOTES
4/28/2023  Primary cardiologist: Dr. Kimberly Jenkins:   Mindy Mireles  is an established 74 y.o.  male here for a 6 month follow up on ascending aorta. SUBJECTIVE/OBJECTIVE:  Mindy Mireles is a 74 y.o. male with a history of abnormal EKG, GERD and a 5 cm ascending aortic aneurysm. HPI :   Mindy Mireles reports notices dyspnea on exertion. He becomes mildly dyspneic with walking from parking lot to office. He denies orthopnea or PND. He is active however not engaged in an organized exercise program.     Review of Systems   Constitutional: Negative for diaphoresis and malaise/fatigue. Cardiovascular:  Negative for chest pain, claudication, dyspnea on exertion, irregular heartbeat, leg swelling, near-syncope, orthopnea, palpitations and paroxysmal nocturnal dyspnea. Respiratory:  Negative for shortness of breath. Neurological:  Negative for dizziness and light-headedness. Vitals:    04/28/23 1100   BP: 122/64   Pulse: 71   Weight: 211 lb (95.7 kg)   Height: 5' 8\" (1.727 m)     No flowsheet data found. Wt Readings from Last 3 Encounters:   04/28/23 211 lb (95.7 kg)   01/18/23 208 lb 3.2 oz (94.4 kg)   10/10/22 206 lb 9.6 oz (93.7 kg)     Body mass index is 32.08 kg/m². Physical Exam  Vitals reviewed. HENT:      Head: Normocephalic and atraumatic. Eyes:      Extraocular Movements: Extraocular movements intact. Pupils: Pupils are equal, round, and reactive to light. Neck:      Vascular: No carotid bruit. Cardiovascular:      Rate and Rhythm: Normal rate and regular rhythm. Pulses: Normal pulses. Pulmonary:      Effort: Pulmonary effort is normal.      Breath sounds: Normal breath sounds. Abdominal:      General: There is no distension. Palpations: Abdomen is soft. Tenderness: There is no abdominal tenderness. Musculoskeletal:         General: Normal range of motion. Cervical back: No tenderness. Right lower leg: No edema. Left lower leg: No edema.    Skin:     General: Skin is

## 2023-05-03 RX ORDER — LANOLIN ALCOHOL/MO/W.PET/CERES
400 CREAM (GRAM) TOPICAL DAILY
Qty: 90 TABLET | Refills: 1 | Status: SHIPPED | OUTPATIENT
Start: 2023-05-03

## 2023-05-08 ENCOUNTER — PROCEDURE VISIT (OUTPATIENT)
Dept: CARDIOLOGY CLINIC | Age: 73
End: 2023-05-08
Payer: MEDICARE

## 2023-05-08 DIAGNOSIS — R06.09 DOE (DYSPNEA ON EXERTION): ICD-10-CM

## 2023-05-08 DIAGNOSIS — I71.21 ANEURYSM OF ASCENDING AORTA WITHOUT RUPTURE (HCC): ICD-10-CM

## 2023-05-08 DIAGNOSIS — I77.810 ASCENDING AORTA DILATION (HCC): Primary | ICD-10-CM

## 2023-05-08 LAB
LV EF: 58 %
LVEF MODALITY: NORMAL

## 2023-05-08 PROCEDURE — 93306 TTE W/DOPPLER COMPLETE: CPT | Performed by: INTERNAL MEDICINE

## 2023-05-08 NOTE — PROGRESS NOTES
Echo noted  05/08/2023  Normal left ventricle structure and systolic function with an ejection   fraction of 55-60%. Grade I diastolic dysfunction. Essentially normal chamber sizes. No significant valvular disease noted. Mild pulmonary hypertension at 37 mmHg. Borderline aortic root dilation (3.8 cm) and dilated ascending aorta (5.2   cm). This is unchanged from previous. No evidence of pericardial effusion.    [address] [facility] [employer] CTA CHEST

## 2023-05-11 ENCOUNTER — TELEPHONE (OUTPATIENT)
Dept: CARDIOLOGY CLINIC | Age: 73
End: 2023-05-11

## 2023-05-11 NOTE — TELEPHONE ENCOUNTER
ECHO Complete 2D W Doppler W Color   Summary   Normal left ventricle structure and systolic function with an ejection   fraction of 55-60%. Grade I diastolic dysfunction. Essentially normal chamber sizes. No significant valvular disease noted. Mild pulmonary hypertension at 37 mmHg. Borderline aortic root dilation (3.8 cm) and dilated ascending aorta (5.2   cm). This is unchanged from previous. No evidence of pericardial effusion. SCHED CTA CHEST  Spoke to pt Patient advised and voices understanding.

## 2023-05-24 ENCOUNTER — HOSPITAL ENCOUNTER (OUTPATIENT)
Dept: CT IMAGING | Age: 73
Discharge: HOME | End: 2023-05-24
Payer: MEDICARE

## 2023-05-24 DIAGNOSIS — I77.810 ASCENDING AORTA DILATION (HCC): ICD-10-CM

## 2023-05-24 LAB
EGFR, POC: 57 ML/MIN/1.73M2
POC CREATININE: 1.3 MG/DL (ref 0.9–1.3)

## 2023-05-24 PROCEDURE — 82565 ASSAY OF CREATININE: CPT

## 2023-05-24 PROCEDURE — 6360000004 HC RX CONTRAST MEDICATION: Performed by: NURSE PRACTITIONER

## 2023-05-24 PROCEDURE — 71275 CT ANGIOGRAPHY CHEST: CPT

## 2023-05-24 RX ORDER — SODIUM CHLORIDE 0.9 % (FLUSH) 0.9 %
5-40 SYRINGE (ML) INJECTION PRN
Status: DISCONTINUED | OUTPATIENT
Start: 2023-05-24 | End: 2023-05-25 | Stop reason: HOSPADM

## 2023-05-24 RX ADMIN — IOPAMIDOL 95 ML: 755 INJECTION, SOLUTION INTRAVENOUS at 09:05

## 2023-06-01 ENCOUNTER — TELEPHONE (OUTPATIENT)
Dept: CARDIOLOGY CLINIC | Age: 73
End: 2023-06-01

## 2023-07-17 ENCOUNTER — OFFICE VISIT (OUTPATIENT)
Dept: INTERNAL MEDICINE CLINIC | Age: 73
End: 2023-07-17
Payer: MEDICARE

## 2023-07-17 VITALS
SYSTOLIC BLOOD PRESSURE: 122 MMHG | HEIGHT: 68 IN | HEART RATE: 74 BPM | BODY MASS INDEX: 31.52 KG/M2 | DIASTOLIC BLOOD PRESSURE: 70 MMHG | WEIGHT: 208 LBS | OXYGEN SATURATION: 95 %

## 2023-07-17 DIAGNOSIS — K76.89 LIVER CYST: ICD-10-CM

## 2023-07-17 DIAGNOSIS — I71.20 THORACIC AORTIC ANEURYSM WITHOUT RUPTURE, UNSPECIFIED PART (HCC): ICD-10-CM

## 2023-07-17 DIAGNOSIS — K21.9 GASTROESOPHAGEAL REFLUX DISEASE WITHOUT ESOPHAGITIS: Primary | ICD-10-CM

## 2023-07-17 DIAGNOSIS — J30.0 VASOMOTOR RHINITIS: ICD-10-CM

## 2023-07-17 PROCEDURE — 1123F ACP DISCUSS/DSCN MKR DOCD: CPT | Performed by: FAMILY MEDICINE

## 2023-07-17 PROCEDURE — 99213 OFFICE O/P EST LOW 20 MIN: CPT | Performed by: FAMILY MEDICINE

## 2023-07-17 RX ORDER — FAMOTIDINE 20 MG
TABLET ORAL
Qty: 180 TABLET | Refills: 2 | Status: SHIPPED | OUTPATIENT
Start: 2023-07-17

## 2023-07-17 SDOH — ECONOMIC STABILITY: FOOD INSECURITY: WITHIN THE PAST 12 MONTHS, YOU WORRIED THAT YOUR FOOD WOULD RUN OUT BEFORE YOU GOT MONEY TO BUY MORE.: NEVER TRUE

## 2023-07-17 SDOH — ECONOMIC STABILITY: FOOD INSECURITY: WITHIN THE PAST 12 MONTHS, THE FOOD YOU BOUGHT JUST DIDN'T LAST AND YOU DIDN'T HAVE MONEY TO GET MORE.: NEVER TRUE

## 2023-07-17 SDOH — ECONOMIC STABILITY: HOUSING INSECURITY
IN THE LAST 12 MONTHS, WAS THERE A TIME WHEN YOU DID NOT HAVE A STEADY PLACE TO SLEEP OR SLEPT IN A SHELTER (INCLUDING NOW)?: NO

## 2023-07-17 SDOH — ECONOMIC STABILITY: INCOME INSECURITY: HOW HARD IS IT FOR YOU TO PAY FOR THE VERY BASICS LIKE FOOD, HOUSING, MEDICAL CARE, AND HEATING?: NOT HARD AT ALL

## 2023-07-17 ASSESSMENT — ENCOUNTER SYMPTOMS
ABDOMINAL PAIN: 0
NAUSEA: 0
COUGH: 0
SHORTNESS OF BREATH: 0
BACK PAIN: 0

## 2023-07-17 NOTE — PROGRESS NOTES
tablet Take 1 tablet by mouth daily 90 tablet 1    turmeric 500 MG CAPS Take by mouth      ipratropium (ATROVENT) 0.03 % nasal spray INSTILL 2 SPRAYS IN EACH NOSTRIL TWICE A DAY 1 each 5    b complex vitamins capsule Take 1 capsule by mouth every morning      aspirin 81 MG tablet Take 1 tablet by mouth three times a week      Krill Oil 300 MG CAPS Take 1 capsule by mouth nightly       Cholecalciferol (VITAMIN D3) 2000 UNITS CAPS Take by mouth every morning       ALLEGRA 180 MG tablet Take 1 tablet by mouth daily. (Patient not taking: Reported on 7/17/2023) 90 tablet 3     No current facility-administered medications for this visit. Vitals:    07/17/23 1337   BP: 122/70   Site: Left Upper Arm   Position: Sitting   Cuff Size: Medium Adult   Pulse: 74   SpO2: 95%   Weight: 208 lb (94.3 kg)   Height: 5' 8\" (1.727 m)     Objective   Physical Exam  Vitals reviewed. Constitutional:       General: He is not in acute distress. Eyes:      Extraocular Movements: Extraocular movements intact. Cardiovascular:      Rate and Rhythm: Normal rate and regular rhythm. Pulmonary:      Effort: Pulmonary effort is normal. No respiratory distress. Breath sounds: Normal breath sounds. Abdominal:      Palpations: Abdomen is soft. Tenderness: There is no abdominal tenderness. Musculoskeletal:      Cervical back: Neck supple. Right lower leg: No edema. Left lower leg: No edema. Neurological:      Mental Status: He is alert and oriented to person, place, and time. Psychiatric:         Mood and Affect: Mood normal.              An electronic signature was used to authenticate this note. --Hien Galvez,      This dictation was generated by voice recognition computer software. Although all attempts are made to edit the dictation for accuracy, there may be errors in the transcription that are not intended.

## 2023-07-18 PROBLEM — K76.89 LIVER CYST: Status: ACTIVE | Noted: 2023-07-18

## 2023-07-18 PROBLEM — M51.36 DDD (DEGENERATIVE DISC DISEASE), LUMBAR: Status: ACTIVE | Noted: 2023-07-18

## 2023-09-29 ENCOUNTER — TELEPHONE (OUTPATIENT)
Dept: INTERNAL MEDICINE CLINIC | Age: 73
End: 2023-09-29

## 2023-09-29 NOTE — TELEPHONE ENCOUNTER
Received a call from the patient, says he's been feeling under the weather for the past couple of days. Experiencing cold and flu like symptoms. Says his chest is very congested. Asked if medicine could be called into pharmacy, he is out of town and would like it to be sent to [address] [place] [address] in [place], phone number is [phone].

## 2023-09-29 NOTE — TELEPHONE ENCOUNTER
Called patient-instructions given regarding symptoms and going to urgent care-he verbalizes iunderstanding

## 2023-10-30 ENCOUNTER — OFFICE VISIT (OUTPATIENT)
Dept: CARDIOLOGY CLINIC | Age: 73
End: 2023-10-30
Payer: MEDICARE

## 2023-10-30 VITALS
HEIGHT: 68 IN | BODY MASS INDEX: 31.4 KG/M2 | SYSTOLIC BLOOD PRESSURE: 102 MMHG | WEIGHT: 207.2 LBS | DIASTOLIC BLOOD PRESSURE: 64 MMHG | HEART RATE: 64 BPM

## 2023-10-30 DIAGNOSIS — I71.21 ANEURYSM OF ASCENDING AORTA WITHOUT RUPTURE (HCC): Primary | ICD-10-CM

## 2023-10-30 PROCEDURE — 1123F ACP DISCUSS/DSCN MKR DOCD: CPT | Performed by: INTERNAL MEDICINE

## 2023-10-30 PROCEDURE — 99214 OFFICE O/P EST MOD 30 MIN: CPT | Performed by: INTERNAL MEDICINE

## 2023-10-30 PROCEDURE — 93000 ELECTROCARDIOGRAM COMPLETE: CPT | Performed by: INTERNAL MEDICINE

## 2023-10-30 NOTE — PATIENT INSTRUCTIONS
Please be informed that if you contact our office outside of normal business hours the physician on call cannot help with any scheduling or rescheduling issues, procedure instruction questions or any type of medication issue. We advise you for any urgent/emergency that you go to the nearest emergency room! PLEASE CALL OUR OFFICE DURING NORMAL BUSINESS HOURS    Monday - Friday   8 am to 5 pm    [place]: [address] [place]: [phone]    [place]:  [phone]    **It is YOUR responsibilty to bring medication bottles and/or updated medication list to [facility]. This will allow us to better serve you and all your healthcare needs**    Thank you for allowing us to care for you today! We want to ensure we can follow your treatment plan and we strive to give you the best outcomes and experience possible. If you ever have a life threatening emergency and call 911 - for an ambulance (EMS)   Our providers can only care for you at:   [facility] or [facility]. Even if you have someone take you or you drive yourself we can only care for you in a [facility] facility. Our providers are not setup at the other healthcare locations! [facility] Laboratory Locations - No appointment necessary. Sites open Monday to Friday. Call your preferred location for test preparation, business   hours and other information you need. [employer] [facility] [employer]. [address] [place] [address]. [address] [place], [facility]  Phone: [phone]     We are committed to providing you the best care possible. If you receive a survey after visiting one of our offices, please take time to share your experience concerning your physician office visit. These surveys are confidential and no health information about you is shared. We are eager to improve for you and we are counting on your feedback to help make that happen.

## 2023-10-30 NOTE — PROGRESS NOTES
07          CARDIOLOGY NOTE      10/30/2023    RE: Jenna Martinez  (1950)                               TO:  DO Andree Ortegamanjinder Costa is a 74 y.o. male who was seen today for management of  Abn ekg                                  For follow-up   HPI:                   Pt has h/o abnormal EKG, GERD seen today for seen by PCP referred here with an abnormal EKG. Had CT chest had 5 cm TAA here for FU has no CP, SOB    Jenna Martinez has the following history recorded in care path:  Patient Active Problem List    Diagnosis Date Noted    Abdominal aortic aneurysm (AAA) without rupture, unspecified part ([address]) 01/18/2023    Liver cyst 07/18/2023    DDD (degenerative disc disease), lumbar 07/18/2023    Aneurysm of ascending aorta ([address]) 01/31/2022    History of temporal arteritis 12/06/2020    Vasomotor rhinitis     Vitamin D deficiency 11/20/2019    Adenomatous colon polyp 11/20/2019    Allergic rhinitis 12/15/2011    GERD  12/15/2011    Lumbar spinal stenosis 12/15/2011     Current Outpatient Medications   Medication Sig Dispense Refill    Casanthranol-Docusate Sodium (STOOL SOFTENER PLUS PO) Take by mouth      ipratropium (ATROVENT) 0.03 % nasal spray INSTILL 2 SPRAYS IN EACH NOSTRIL TWICE A DAY 3 each 0    PEPCID 20 MG tablet TAKE 1 TABLET BY MOUTH TWICE A  tablet 2    magnesium oxide (MAG-OX) 400 (240 Mg) MG tablet Take 1 tablet by mouth daily 90 tablet 1    turmeric 500 MG CAPS Take by mouth      b complex vitamins capsule Take 1 capsule by mouth every morning      aspirin 81 MG tablet Take 1 tablet by mouth three times a week      Krill Oil 300 MG CAPS Take 1 capsule by mouth nightly       Cholecalciferol (VITAMIN D3) 2000 UNITS CAPS Take by mouth every morning       ALLEGRA 180 MG tablet Take 1 tablet by mouth daily. 90 tablet 3     No current facility-administered medications for this visit.      Allergies: Famotidine, Fexofenadine, and Ranitidine hcl  Past Medical History:

## 2023-11-02 ENCOUNTER — TELEPHONE (OUTPATIENT)
Dept: CARDIOTHORACIC SURGERY | Age: 73
End: 2023-11-02

## 2023-11-06 RX ORDER — LANOLIN ALCOHOL/MO/W.PET/CERES
400 CREAM (GRAM) TOPICAL DAILY
Qty: 90 TABLET | Refills: 3 | Status: SHIPPED | OUTPATIENT
Start: 2023-11-06

## 2023-11-15 ENCOUNTER — TELEPHONE (OUTPATIENT)
Dept: CARDIOTHORACIC SURGERY | Age: 73
End: 2023-11-15

## 2023-11-28 ENCOUNTER — TELEPHONE (OUTPATIENT)
Dept: CARDIOTHORACIC SURGERY | Age: 73
End: 2023-11-28

## 2023-12-05 ENCOUNTER — TELEPHONE (OUTPATIENT)
Dept: CARDIOTHORACIC SURGERY | Age: 73
End: 2023-12-05

## 2023-12-06 NOTE — PROGRESS NOTES
12/8/2023           History Physical/CONSULT  Emilio Benitez, DO             [address]         Re: Solomon Chiang      Dear Santos Hinton   Dear Dr Benjamín العراقي,          I had the pleasure of seeing your patient Ashley Moore (67 y.o. male) today in office for ascending aneurysm. He has known for about 2 years that he has not been enlarged ascending aorta which was discovered after a echocardiogram which was done for a slightly abnormal EKG. He has been followed by Dr. Benjamín العراقي for his cardiac care. He is concerned that he might need cardiac surgery for his enlarged aorta. He is here by himself today. He tells me he intermittently has mild discomfort in his mid chest which is clearly not related to exercise and usually goes away. He is not sure if this discomfort is more frequent when he has a heavy meal but is certainly not related to laying flat in bed. He says his main complaint is worsening shortness of breath. He has been slightly short of breath since this summer with exertion, but he had COVID 2 months ago and since then this has been a real problem. He tried to do some yard work yesterday and got severely winded doing this. PMHx:  Past Medical History:   Diagnosis Date    Allergic rhinitis     Aneurysm of ascending aorta ([address]) 01/31/2022    ascending aorta(5.2cm).     Colon polyp     adenoma    Diverticulosis     GERD (gastroesophageal reflux disease) 07/1996    by UGI    Liver cyst     One undescended testicle     right testicle as a child    Periumbilical hernia     PONV (postoperative nausea and vomiting)     Prostate cancer ([address]) 08/2003    S/P resection    Spinal stenosis, lumbar 11/1994    by MRI    Temporal arteritis ([address]) 06/2014    Vasomotor rhinitis        PSHx:  Past Surgical History:   Procedure Laterality Date    ARTERY BIOPSY  6/2/2014    Temporal artery    COLONOSCOPY  8/20/2013,5/16/2008, 2004    COLONOSCOPY  11/14/2018    6mm sessile

## 2023-12-08 ENCOUNTER — HOSPITAL ENCOUNTER (OUTPATIENT)
Dept: CT IMAGING | Age: 73
Discharge: HOME | End: 2023-12-08
Attending: THORACIC SURGERY (CARDIOTHORACIC VASCULAR SURGERY)
Payer: MEDICARE

## 2023-12-08 ENCOUNTER — INITIAL CONSULT (OUTPATIENT)
Dept: CARDIOTHORACIC SURGERY | Age: 73
End: 2023-12-08
Payer: MEDICARE

## 2023-12-08 VITALS
DIASTOLIC BLOOD PRESSURE: 80 MMHG | SYSTOLIC BLOOD PRESSURE: 126 MMHG | BODY MASS INDEX: 31.92 KG/M2 | WEIGHT: 210.6 LBS | HEART RATE: 76 BPM | HEIGHT: 68 IN

## 2023-12-08 DIAGNOSIS — I71.21 ANEURYSM OF ASCENDING AORTA WITHOUT RUPTURE (HCC): Primary | ICD-10-CM

## 2023-12-08 DIAGNOSIS — I71.21 ANEURYSM OF ASCENDING AORTA WITHOUT RUPTURE (HCC): ICD-10-CM

## 2023-12-08 LAB
EGFR, POC: >60 ML/MIN/1.73M2
POC CREATININE: 0.9 MG/DL (ref 0.9–1.3)

## 2023-12-08 PROCEDURE — 6360000004 HC RX CONTRAST MEDICATION: Performed by: THORACIC SURGERY (CARDIOTHORACIC VASCULAR SURGERY)

## 2023-12-08 PROCEDURE — 1123F ACP DISCUSS/DSCN MKR DOCD: CPT | Performed by: THORACIC SURGERY (CARDIOTHORACIC VASCULAR SURGERY)

## 2023-12-08 PROCEDURE — 74174 CTA ABD&PLVS W/CONTRAST: CPT

## 2023-12-08 PROCEDURE — 71275 CT ANGIOGRAPHY CHEST: CPT

## 2023-12-08 PROCEDURE — 82565 ASSAY OF CREATININE: CPT

## 2023-12-08 PROCEDURE — 2580000003 HC RX 258: Performed by: THORACIC SURGERY (CARDIOTHORACIC VASCULAR SURGERY)

## 2023-12-08 PROCEDURE — 99205 OFFICE O/P NEW HI 60 MIN: CPT | Performed by: THORACIC SURGERY (CARDIOTHORACIC VASCULAR SURGERY)

## 2023-12-08 RX ORDER — SODIUM CHLORIDE 9 MG/ML
10 INJECTION INTRAVENOUS PRN
Status: DISCONTINUED | OUTPATIENT
Start: 2023-12-08 | End: 2023-12-09 | Stop reason: HOSPADM

## 2023-12-08 RX ADMIN — IOPAMIDOL 110 ML: 755 INJECTION, SOLUTION INTRAVENOUS at 16:01

## 2023-12-08 RX ADMIN — SODIUM CHLORIDE, PRESERVATIVE FREE 10 ML: 5 INJECTION INTRAVENOUS at 13:11

## 2023-12-08 NOTE — PROGRESS NOTES
12/8/2023           History Physical/CONSULT  Ricky Palacios DO             [address] [address]             Re: Matthieu Montoya      Dear [facility]   Dear Dr Ramone Arango,          I had the pleasure of seeing your patient Angelica Mckeon (74 y.o. male) today in office for evaluation of an ascending aortic aneurysm. PMHx:  Past Medical History:   Diagnosis Date    Allergic rhinitis     Aneurysm of ascending aorta ([address]) 01/31/2022    ascending aorta(5.2cm).     Colon polyp     adenoma    Diverticulosis     GERD (gastroesophageal reflux disease) 07/1996    by UGI    Liver cyst     One undescended testicle     right testicle as a child    Periumbilical hernia     PONV (postoperative nausea and vomiting)     Prostate cancer ([address]) 08/2003    S/P resection    Spinal stenosis, lumbar 11/1994    by MRI    Temporal arteritis ([address]) 06/2014    Vasomotor rhinitis        PSHx:  Past Surgical History:   Procedure Laterality Date    ARTERY BIOPSY  6/2/2014    Temporal artery    COLONOSCOPY  8/20/2013,5/16/2008, 2004    COLONOSCOPY  11/14/2018    6mm sessile residual poluyp in hepatic flexure, grade 2 hemorrhoids    COLONOSCOPY N/A 11/14/2018    COLONOSCOPY CONTROL HEMORRHAGE with eleview injection and polypectomy with application of two hemoclips performed by Delmy Dodd MD at [address]      right    OTHER SURGICAL HISTORY      pt had abscesses on on and leg removed and drain placed, years ago    [facility]  2/2005    left    SINUS SURGERY      right maxillary    TESTICLE SURGERY      right testicular release       Social Hx:  Social History     Socioeconomic History    Marital status:      Spouse name: Not on file    Number of children: Not on file    Years of education: Not on file    Highest education level: Not on file   Occupational History    Occupation: Retired   Tobacco Use    Smoking status: Never    Smokeless tobacco: Never   Vaping Use

## 2023-12-13 NOTE — PROGRESS NOTES
12/15/2023             Brigette Sharif, DO              [facility]    Duncan Denis MD           Re: Dawson Altman    Dear Michael Feliciano   Dear Dr Keily Lehman,       I had the pleasure of seeing your patient Chay Real (39 y.o. male) today in office for ascending aneurysm. He comes for a follow-up visit today after a CT angiography of his chest and a echocardiogram.  He had a CT angiography in May 2023 and when he came to our office recently we ordered a follow-up CT angiography of of his chest abdomen and pelvis. He is curious to see the result. He is here with his wife today. She just recently lost her mother who lives in [place] and they have been over there for extended periods of time. There has been no change in his intermittent chest pressure or chest pain. He would like to travel to [place] at the end of February and was hoping that he could postpone his surgery until after his trip. He also tells me that his grandfather  of a ascending aortic aneurysm after emergency surgery. PMHx:  Past Medical History:   Diagnosis Date    Allergic rhinitis     Aneurysm of ascending aorta ([address]) 2022    ascending aorta(5.2cm).     Colon polyp     adenoma    Diverticulosis     GERD (gastroesophageal reflux disease) 1996    by UGI    Liver cyst     One undescended testicle     right testicle as a child    Periumbilical hernia     PONV (postoperative nausea and vomiting)     Prostate cancer ([address]) 2003    S/P resection    Spinal stenosis, lumbar 1994    by MRI    Temporal arteritis ([address] 2014    Vasomotor rhinitis        PSHx:  Past Surgical History:   Procedure Laterality Date    ARTERY BIOPSY  2014    Temporal artery    COLONOSCOPY  2013,2008, 2004    COLONOSCOPY  2018    6mm sessile residual poluyp in hepatic flexure, grade 2 hemorrhoids    COLONOSCOPY N/A 2018    COLONOSCOPY CONTROL HEMORRHAGE with eleview injection and polypectomy

## 2023-12-14 ENCOUNTER — TELEPHONE (OUTPATIENT)
Dept: CARDIOTHORACIC SURGERY | Age: 73
End: 2023-12-14

## 2023-12-14 DIAGNOSIS — I71.21 ANEURYSM OF ASCENDING AORTA WITHOUT RUPTURE (HCC): Primary | ICD-10-CM

## 2023-12-14 NOTE — TELEPHONE ENCOUNTER
Pt notified and confirmed appointment for echo on 12/15/23 @8am at [address] [facility] prior to [address] with Dr. Jenna Hernandez @295am. Pt voiced understanding.

## 2023-12-15 ENCOUNTER — OFFICE VISIT (OUTPATIENT)
Dept: CARDIOTHORACIC SURGERY | Age: 73
End: 2023-12-15
Payer: MEDICARE

## 2023-12-15 VITALS
WEIGHT: 208 LBS | SYSTOLIC BLOOD PRESSURE: 132 MMHG | HEIGHT: 68 IN | HEART RATE: 66 BPM | BODY MASS INDEX: 31.52 KG/M2 | DIASTOLIC BLOOD PRESSURE: 78 MMHG | OXYGEN SATURATION: 94 %

## 2023-12-15 DIAGNOSIS — I71.60: ICD-10-CM

## 2023-12-15 DIAGNOSIS — I71.21 ANEURYSM OF ASCENDING AORTA WITHOUT RUPTURE (HCC): Primary | ICD-10-CM

## 2023-12-15 DIAGNOSIS — I71.40 AAA (ABDOMINAL AORTIC ANEURYSM) (HCC): ICD-10-CM

## 2023-12-15 DIAGNOSIS — R94.31 ABNORMAL EKG: ICD-10-CM

## 2023-12-15 DIAGNOSIS — I71.20 THORACIC AORTIC ANEURYSM (TAA) (HCC): ICD-10-CM

## 2023-12-15 DIAGNOSIS — R06.02 SHORTNESS OF BREATH: ICD-10-CM

## 2023-12-15 DIAGNOSIS — R07.9 CHEST PAIN: ICD-10-CM

## 2023-12-15 DIAGNOSIS — Z01.810 PRE-OPERATIVE CARDIOVASCULAR EXAMINATION: Primary | ICD-10-CM

## 2023-12-15 PROCEDURE — 99215 OFFICE O/P EST HI 40 MIN: CPT | Performed by: THORACIC SURGERY (CARDIOTHORACIC VASCULAR SURGERY)

## 2023-12-15 PROCEDURE — 1123F ACP DISCUSS/DSCN MKR DOCD: CPT | Performed by: THORACIC SURGERY (CARDIOTHORACIC VASCULAR SURGERY)

## 2023-12-28 ENCOUNTER — OFFICE VISIT (OUTPATIENT)
Dept: FAMILY MEDICINE CLINIC | Facility: CLINIC | Age: 73
End: 2023-12-28
Payer: MEDICARE

## 2023-12-28 VITALS
WEIGHT: 207 LBS | RESPIRATION RATE: 16 BRPM | DIASTOLIC BLOOD PRESSURE: 74 MMHG | HEIGHT: 68 IN | SYSTOLIC BLOOD PRESSURE: 118 MMHG | HEART RATE: 77 BPM | BODY MASS INDEX: 31.37 KG/M2 | OXYGEN SATURATION: 96 % | TEMPERATURE: 99.1 F

## 2023-12-28 DIAGNOSIS — I77.810 AORTIC ROOT DILATATION: ICD-10-CM

## 2023-12-28 DIAGNOSIS — K76.89 LIVER CYST: ICD-10-CM

## 2023-12-28 DIAGNOSIS — D12.6 ADENOMATOUS POLYP OF COLON, UNSPECIFIED PART OF COLON: ICD-10-CM

## 2023-12-28 DIAGNOSIS — I71.21 ANEURYSM OF ASCENDING AORTA WITHOUT RUPTURE: ICD-10-CM

## 2023-12-28 DIAGNOSIS — J10.1 INFLUENZA A: ICD-10-CM

## 2023-12-28 DIAGNOSIS — J34.89 NASAL DISCHARGE: Primary | ICD-10-CM

## 2023-12-28 DIAGNOSIS — Z12.11 SCREENING FOR MALIGNANT NEOPLASM OF COLON: ICD-10-CM

## 2023-12-28 PROBLEM — M51.36 DDD (DEGENERATIVE DISC DISEASE), LUMBAR: Status: ACTIVE | Noted: 2023-07-18

## 2023-12-28 PROBLEM — M51.369 DDD (DEGENERATIVE DISC DISEASE), LUMBAR: Status: ACTIVE | Noted: 2023-07-18

## 2023-12-28 PROBLEM — K43.9 HERNIA OF ABDOMINAL WALL: Status: ACTIVE | Noted: 2023-12-28

## 2023-12-28 PROBLEM — I71.60 THORACOABDOMINAL AORTIC ANEURYSM (TAAA): Status: ACTIVE | Noted: 2023-12-15

## 2023-12-28 PROBLEM — J30.0 VASOMOTOR RHINITIS: Status: ACTIVE | Noted: 2023-12-28

## 2023-12-28 PROBLEM — E55.9 VITAMIN D DEFICIENCY: Status: ACTIVE | Noted: 2019-11-20

## 2023-12-28 PROBLEM — I25.10 CORONARY ARTERY DISEASE INVOLVING NATIVE CORONARY ARTERY OF NATIVE HEART WITHOUT ANGINA PECTORIS: Status: ACTIVE | Noted: 2023-12-28

## 2023-12-28 PROBLEM — I71.60 THORACOABDOMINAL AORTIC ANEURYSM (TAAA): Status: RESOLVED | Noted: 2023-12-15 | Resolved: 2023-12-28

## 2023-12-28 PROBLEM — I25.10 CORONARY ARTERY DISEASE INVOLVING NATIVE CORONARY ARTERY OF NATIVE HEART WITHOUT ANGINA PECTORIS: Status: RESOLVED | Noted: 2023-12-28 | Resolved: 2023-12-28

## 2023-12-28 PROBLEM — Z87.39 HISTORY OF TEMPORAL ARTERITIS: Status: ACTIVE | Noted: 2020-12-06

## 2023-12-28 PROBLEM — I71.40 ABDOMINAL AORTIC ANEURYSM (AAA) WITHOUT RUPTURE: Status: ACTIVE | Noted: 2023-01-18

## 2023-12-28 PROBLEM — I71.40 ABDOMINAL AORTIC ANEURYSM (AAA) WITHOUT RUPTURE: Status: RESOLVED | Noted: 2023-01-18 | Resolved: 2023-12-28

## 2023-12-28 LAB
EXPIRATION DATE: ABNORMAL
FLUAV AG UPPER RESP QL IA.RAPID: DETECTED
FLUBV AG UPPER RESP QL IA.RAPID: NOT DETECTED
INTERNAL CONTROL: ABNORMAL
Lab: ABNORMAL
SARS-COV-2 AG UPPER RESP QL IA.RAPID: NOT DETECTED

## 2023-12-28 RX ORDER — FAMOTIDINE 40 MG/1
20 TABLET, FILM COATED ORAL 2 TIMES DAILY
Status: SHIPPED
Start: 2023-12-28

## 2023-12-28 RX ORDER — OSELTAMIVIR PHOSPHATE 75 MG/1
75 CAPSULE ORAL 2 TIMES DAILY
Qty: 10 CAPSULE | Refills: 0 | Status: SHIPPED | OUTPATIENT
Start: 2023-12-28

## 2023-12-28 RX ORDER — DOCUSATE SODIUM 250 MG
250 CAPSULE ORAL DAILY
COMMUNITY

## 2023-12-29 ENCOUNTER — TELEPHONE (OUTPATIENT)
Dept: CARDIOTHORACIC SURGERY | Age: 73
End: 2023-12-29

## 2023-12-29 NOTE — TELEPHONE ENCOUNTER
Patient called into office to inform staff that he has now moved out of state and lives in [facility]. Patient stated that he would get his testing and surgery done there and would like to cancel surgery and any further appts here.

## 2024-01-02 DIAGNOSIS — K21.9 GASTROESOPHAGEAL REFLUX DISEASE WITHOUT ESOPHAGITIS: ICD-10-CM

## 2024-01-02 RX ORDER — FAMOTIDINE 20 MG
TABLET ORAL
Qty: 180 TABLET | Refills: 0 | Status: SHIPPED | OUTPATIENT
Start: 2024-01-02

## 2024-01-12 ENCOUNTER — PREP FOR SURGERY (OUTPATIENT)
Dept: OTHER | Facility: HOSPITAL | Age: 74
End: 2024-01-12

## 2024-01-12 ENCOUNTER — TELEPHONE (OUTPATIENT)
Dept: GASTROENTEROLOGY | Facility: CLINIC | Age: 74
End: 2024-01-12

## 2024-01-12 DIAGNOSIS — D12.3 ADENOMATOUS POLYP OF TRANSVERSE COLON: Primary | ICD-10-CM

## 2024-01-12 NOTE — TELEPHONE ENCOUNTER
NO RECORD OF C/S     PERSONAL HX OF POLYPS    NO FAMILY HX OF POLYPS    NO FAMILY HX OF COLON CA    NO ASA OR BLOOD THINNERS        LIST OF  MEDICATIONS      PEPCID  ASPRIN  ALLEGRA  MAGNESIUM  IPRATROPIUM  KRILL OIL  B COMPLEX  D3  TURMERIC  STOOL SOFTNER        OA QUESTIONNAIRE SCANNED IN MEDIA

## 2024-01-16 ENCOUNTER — TELEPHONE (OUTPATIENT)
Dept: GASTROENTEROLOGY | Facility: CLINIC | Age: 74
End: 2024-01-16

## 2024-01-16 NOTE — TELEPHONE ENCOUNTER
SPOKE WITH BOUCHRA  for COLONOSCOPY  02/20/2024 arrive at  630AM [facility]  . mailed prep instructions to verified address on file....miralax OK FOR HUB TO READ

## 2024-01-26 ENCOUNTER — OFFICE VISIT (OUTPATIENT)
Dept: CARDIAC SURGERY | Facility: CLINIC | Age: 74
End: 2024-01-26
Payer: MEDICARE

## 2024-01-26 VITALS
BODY MASS INDEX: 31.52 KG/M2 | TEMPERATURE: 97.3 F | WEIGHT: 208 LBS | HEIGHT: 68 IN | RESPIRATION RATE: 20 BRPM | DIASTOLIC BLOOD PRESSURE: 92 MMHG | SYSTOLIC BLOOD PRESSURE: 162 MMHG | HEART RATE: 70 BPM | OXYGEN SATURATION: 98 %

## 2024-01-26 DIAGNOSIS — R10.9 RIGHT FLANK PAIN: ICD-10-CM

## 2024-01-26 DIAGNOSIS — Z87.39 HISTORY OF TEMPORAL ARTERITIS: ICD-10-CM

## 2024-01-26 DIAGNOSIS — I71.21 ANEURYSM OF ASCENDING AORTA WITHOUT RUPTURE: ICD-10-CM

## 2024-01-26 DIAGNOSIS — K76.89 LIVER CYST: ICD-10-CM

## 2024-01-26 DIAGNOSIS — I77.810 AORTIC ROOT DILATATION: Primary | ICD-10-CM

## 2024-01-26 DIAGNOSIS — K21.9 GASTROESOPHAGEAL REFLUX DISEASE, UNSPECIFIED WHETHER ESOPHAGITIS PRESENT: ICD-10-CM

## 2024-01-26 NOTE — LETTER
January 28, 2024     Hai Bender MD  [address]  [facility] [address]    Patient: Nirav Szymanski   YOB: 1950   Date of Visit: 1/26/2024     Dear Hai Bender MD:       Thank you for referring Nirav Szymanski to me for evaluation. Below are the relevant portions of my assessment and plan of care.    If you have questions, please do not hesitate to call me. I look forward to following Nirav along with you.         Sincerely,        John Akhtar MD        CC: MD Giovana Avila Sebastian, MD  01/28/24 1102  Sign when Signing Visit  1/28/2024    Seen on 1/26/2024    Reason for consultation:   Evaluation of ascending aortic aneurysm    Chief Complaint   Same    History of Present Illness:       Dear Dr. Bender, Hai Daniel* and Colleagues,  It was nice to see Nirav Szymanski in consultation at your request. He is a 73 y.o. male with GERD, liver cyst, temporal arteritis and flank pain in the past who has a known aneurysm which was recently evaluated somewhere in [place] and the CT scan revealed a slightly dilated aortic root and an ascending aortic aneurysm approaching 5.2 cm without dissection or ulceration.  The CT scan also does not show a descending thoracic aortic aneurysm.  It does show a cyst which is around 11 to 12 cm and in my comparison with the previous scan is the same size.  In the past there was consideration about surgery because of symptoms but he has remained asymptomatic episode over 1 year ago. He denies syncope, TIA, orthopnea, PND or lower extremity edema, he denies chest pain or upper back pain.  He denies a family history of aneurysms, dissections or connective tissue disorders.  Has a history of temporal arteritis but he denies frequent headaches or other rheumatologic associated findings.  He had a 2D echocardiogram that showed an ejection fraction of 55 to 60% mild to moderate tricuspid regurgitation and mild AI sclerosis of the aortic valve without  regurgitation or stenosis.  He was off for an operation by a cardiac surgeon due to the size of his aneurysm.  He was uncomfortable due to the experience with surgery in that place and he decided to come to [place] for further attention.  He is to see a GI doctor for his liver cyst.    Patient Active Problem List   Diagnosis   • Right flank pain   • Vitamin D deficiency   • Vasomotor rhinitis   • Spinal stenosis of lumbar region   • Liver cyst   • History of temporal arteritis   • GERD (gastroesophageal reflux disease)   • DDD (degenerative disc disease), lumbar   • Aortic root dilatation   • Aneurysm of ascending aorta   • Allergic rhinitis   • Adenomatous polyp of colon   • Hernia of abdominal wall       Past Medical History:   Diagnosis Date   • Cancer 2003    Prostate Cancer   • Enlarged aorta        Past Surgical History:   Procedure Laterality Date   • HERNIA REPAIR     • NOSE SURGERY     • PROSTATE SURGERY  2004    removal of prostate cancer   • ROTATOR CUFF REPAIR Left     2005   • TESTICLE SURGERY         Allergies   Allergen Reactions   • Famotidine Swelling     Rash   • Ranitidine Hcl Swelling     Swelling   • Fexofenadine Rash     Skin itches/feels prickly         Current Outpatient Medications:   •  aspirin 325 MG tablet, Take 81 mg by mouth Daily., Disp: , Rfl:   •  aspirin 81 MG chewable tablet, Chew 1 tablet Every Night., Disp: , Rfl:   •  B Complex Vitamins (VITAMIN B COMPLEX PO), Take  by mouth., Disp: , Rfl:   •  docusate sodium (COLACE) 250 MG capsule, Take 1 capsule by mouth Daily., Disp: , Rfl:   •  famotidine (PEPCID) 40 MG tablet, Take 0.5 tablets by mouth 2 (Two) Times a Day., Disp: , Rfl:   •  fexofenadine (ALLEGRA) 180 MG tablet, Take 1 tablet by mouth Every Night., Disp: , Rfl:   •  Krill Oil 1000 MG capsule, Take  by mouth Every Night., Disp: , Rfl:   •  Magnesium 125 MG capsule, Take  by mouth Daily., Disp: , Rfl:   •  oseltamivir (TAMIFLU) 75 MG capsule, Take 1 capsule by mouth 2  (Two) Times a Day., Disp: 10 capsule, Rfl: 0  •  Turmeric (QC TUMERIC COMPLEX PO), Take  by mouth., Disp: , Rfl:   •  vitamin D3 125 MCG (5000 UT) capsule capsule, Take 1 capsule by mouth Daily., Disp: , Rfl:     Social History     Socioeconomic History   • Marital status:    Tobacco Use   • Smoking status: Never   • Smokeless tobacco: Never   Vaping Use   • Vaping Use: Never used   Substance and Sexual Activity   • Alcohol use: Not Currently   • Drug use: Never   • Sexual activity: Defer       Family history is negative for aneurysms, dissections or connective tissue disorders    Review of Systems:  As HPI, otherwise noncontributory    Physical Exam:    Vital Signs:  Weight: 94.3 kg (208 lb)   Body mass index is 31.63 kg/m².  Temp: 97.3 °F (36.3 °C)   Heart Rate: 70   BP: 162/92     Constitutional:       Appearance: Well-developed.   Eyes:      Conjunctiva/sclera: Conjunctivae normal.      Pupils: Pupils are equal, round, and reactive to light.   HENT:      Head: Normocephalic and atraumatic.      Nose: Nose normal.   Neck:      Thyroid: No thyromegaly.      Vascular: No JVD.      Lymphadenopathy: No cervical adenopathy.   Pulmonary:      Effort: Pulmonary effort is normal.      Breath sounds: Normal breath sounds. No rales.   Cardiovascular:      Normal rate. Regular rhythm.      Murmurs: There is no murmur.      No gallop.    Pulses:     Intact distal pulses. No decreased pulses.   Edema:     Peripheral edema absent.   Abdominal:      General: Bowel sounds are normal. There is no distension.      Palpations: Abdomen is soft. There is no abdominal mass.      Tenderness: There is no abdominal tenderness.   Musculoskeletal: Normal range of motion.         General: No tenderness or deformity.      Cervical back: Normal range of motion and neck supple. Skin:     General: Skin is warm and dry.      Findings: No erythema or rash.   Neurological:      Mental Status: Alert and oriented to person, place, and time.       Deep Tendon Reflexes: Reflexes are normal and symmetric.   Psychiatric:         Behavior: Behavior normal.         Relevant studies (other than HPI)        Assessment:     Problems Addressed this Visit          Cardiac and Vasculature    Aortic root dilatation - Primary    Aneurysm of ascending aorta       Gastrointestinal Abdominal     Right flank pain    Liver cyst    GERD (gastroesophageal reflux disease)       Musculoskeletal and Injuries    History of temporal arteritis     Diagnoses         Codes Comments    Aortic root dilatation    -  Primary ICD-10-CM: I77.810  ICD-9-CM: 447.71     Aneurysm of ascending aorta without rupture     ICD-10-CM: I71.21  ICD-9-CM: 441.2     Right flank pain     ICD-10-CM: R10.9  ICD-9-CM: 789.09     Liver cyst     ICD-10-CM: K76.89  ICD-9-CM: 573.8     Gastroesophageal reflux disease, unspecified whether esophagitis present     ICD-10-CM: K21.9  ICD-9-CM: 530.81     History of temporal arteritis     ICD-10-CM: Z87.39  ICD-9-CM: V12.59           Assessment/recommendation:     5.2 cm ascending aortic aneurysm without dissection or ulceration.  He has baseline dyspnea however is not in my opinion associated with the aneurysm.  He has no evidence of aortic valve disease, heart failure or coronary artery disease.  Based on the size of the aneurysm and his size and height I recommend proximal aortic aneurysm repair most likely with a dacron interposition graft.  I quoted an operative mortality less than 1% of his complications less than 5%.  He will need as part of the workup pulmonary function test which may help also to reveal an etiology of his dyspnea, he will need a cardiac cath and routine labs and other preoperative studies.  He wishes to proceed with surgery.  He has a large hepatic cyst however it is no significantly enlarged since the scan 2 years ago and he is at the present asymptomatic.  I am not sure that that needs to be addressed surgically at this point.  He will  see Dr. Bojorquez from the cardiology team for general cardiology evaluation and to arrange for a cardiac cath  Temporal arteritis, no recent headaches but potentially could be relationship with the aneurysm    Thank you for allowing me to participate in his care.    Regards,    John Akhtar M.D.  I spent over 65 minutes before, during after the office visit in reviewing the record, examining the patient, reviewing and interpreting myself the echocardiogram chest CT, discussing the findings and options, discussed my recommendation of surgery, discussing the operation in detail with risk and benefits, discussing the natural history of aneurysm disease and the guidelines for intervention and spent time in documenting in the electronic record and discussing the case with the cardiology team.

## 2024-01-28 NOTE — PROGRESS NOTES
1/28/2024    Seen on 1/26/2024    Reason for consultation:   Evaluation of ascending aortic aneurysm    Chief Complaint   Same    History of Present Illness:       Dear Dr. Bender, Hai Daniel* and Colleagues,  It was nice to see Nirav Szymanski in consultation at your request. He is a 73 y.o. male with GERD, liver cyst, temporal arteritis and flank pain in the past who has a known aneurysm which was recently evaluated somewhere in [place] and the CT scan revealed a slightly dilated aortic root and an ascending aortic aneurysm approaching 5.2 cm without dissection or ulceration.  The CT scan also does not show a descending thoracic aortic aneurysm.  It does show a cyst which is around 11 to 12 cm and in my comparison with the previous scan is the same size.  In the past there was consideration about surgery because of symptoms but he has remained asymptomatic episode over 1 year ago. He denies syncope, TIA, orthopnea, PND or lower extremity edema, he denies chest pain or upper back pain.  He denies a family history of aneurysms, dissections or connective tissue disorders.  Has a history of temporal arteritis but he denies frequent headaches or other rheumatologic associated findings.  He had a 2D echocardiogram that showed an ejection fraction of 55 to 60% mild to moderate tricuspid regurgitation and mild AI sclerosis of the aortic valve without regurgitation or stenosis.  He was off for an operation by a cardiac surgeon due to the size of his aneurysm.  He was uncomfortable due to the experience with surgery in that place and he decided to come to [place] for further attention.  He is to see a GI doctor for his liver cyst.    Patient Active Problem List   Diagnosis    Right flank pain    Vitamin D deficiency    Vasomotor rhinitis    Spinal stenosis of lumbar region    Liver cyst    History of temporal arteritis    GERD (gastroesophageal reflux disease)    DDD (degenerative disc disease), lumbar    Aortic root  dilatation    Aneurysm of ascending aorta    Allergic rhinitis    Adenomatous polyp of colon    Hernia of abdominal wall       Past Medical History:   Diagnosis Date    Cancer 2003    Prostate Cancer    Enlarged aorta        Past Surgical History:   Procedure Laterality Date    HERNIA REPAIR      NOSE SURGERY      PROSTATE SURGERY  2004    removal of prostate cancer    ROTATOR CUFF REPAIR Left     2005    TESTICLE SURGERY         Allergies   Allergen Reactions    Famotidine Swelling     Rash    Ranitidine Hcl Swelling     Swelling    Fexofenadine Rash     Skin itches/feels prickly         Current Outpatient Medications:     aspirin 325 MG tablet, Take 81 mg by mouth Daily., Disp: , Rfl:     aspirin 81 MG chewable tablet, Chew 1 tablet Every Night., Disp: , Rfl:     B Complex Vitamins (VITAMIN B COMPLEX PO), Take  by mouth., Disp: , Rfl:     docusate sodium (COLACE) 250 MG capsule, Take 1 capsule by mouth Daily., Disp: , Rfl:     famotidine (PEPCID) 40 MG tablet, Take 0.5 tablets by mouth 2 (Two) Times a Day., Disp: , Rfl:     fexofenadine (ALLEGRA) 180 MG tablet, Take 1 tablet by mouth Every Night., Disp: , Rfl:     Krill Oil 1000 MG capsule, Take  by mouth Every Night., Disp: , Rfl:     Magnesium 125 MG capsule, Take  by mouth Daily., Disp: , Rfl:     oseltamivir (TAMIFLU) 75 MG capsule, Take 1 capsule by mouth 2 (Two) Times a Day., Disp: 10 capsule, Rfl: 0    Turmeric (QC TUMERIC COMPLEX PO), Take  by mouth., Disp: , Rfl:     vitamin D3 125 MCG (5000 UT) capsule capsule, Take 1 capsule by mouth Daily., Disp: , Rfl:     Social History     Socioeconomic History    Marital status:    Tobacco Use    Smoking status: Never    Smokeless tobacco: Never   Vaping Use    Vaping Use: Never used   Substance and Sexual Activity    Alcohol use: Not Currently    Drug use: Never    Sexual activity: Defer       Family history is negative for aneurysms, dissections or connective tissue disorders    Review of Systems:  As HPI,  otherwise noncontributory    Physical Exam:    Vital Signs:  Weight: 94.3 kg (208 lb)   Body mass index is 31.63 kg/m².  Temp: 97.3 °F (36.3 °C)   Heart Rate: 70   BP: 162/92     Constitutional:       Appearance: Well-developed.   Eyes:      Conjunctiva/sclera: Conjunctivae normal.      Pupils: Pupils are equal, round, and reactive to light.   HENT:      Head: Normocephalic and atraumatic.      Nose: Nose normal.   Neck:      Thyroid: No thyromegaly.      Vascular: No JVD.      Lymphadenopathy: No cervical adenopathy.   Pulmonary:      Effort: Pulmonary effort is normal.      Breath sounds: Normal breath sounds. No rales.   Cardiovascular:      Normal rate. Regular rhythm.      Murmurs: There is no murmur.      No gallop.    Pulses:     Intact distal pulses. No decreased pulses.   Edema:     Peripheral edema absent.   Abdominal:      General: Bowel sounds are normal. There is no distension.      Palpations: Abdomen is soft. There is no abdominal mass.      Tenderness: There is no abdominal tenderness.   Musculoskeletal: Normal range of motion.         General: No tenderness or deformity.      Cervical back: Normal range of motion and neck supple. Skin:     General: Skin is warm and dry.      Findings: No erythema or rash.   Neurological:      Mental Status: Alert and oriented to person, place, and time.      Deep Tendon Reflexes: Reflexes are normal and symmetric.   Psychiatric:         Behavior: Behavior normal.         Relevant studies (other than HPI)        Assessment:     Problems Addressed this Visit          Cardiac and Vasculature    Aortic root dilatation - Primary    Aneurysm of ascending aorta       Gastrointestinal Abdominal     Right flank pain    Liver cyst    GERD (gastroesophageal reflux disease)       Musculoskeletal and Injuries    History of temporal arteritis     Diagnoses         Codes Comments    Aortic root dilatation    -  Primary ICD-10-CM: I77.810  ICD-9-CM: 447.71     Aneurysm of ascending  aorta without rupture     ICD-10-CM: I71.21  ICD-9-CM: 441.2     Right flank pain     ICD-10-CM: R10.9  ICD-9-CM: 789.09     Liver cyst     ICD-10-CM: K76.89  ICD-9-CM: 573.8     Gastroesophageal reflux disease, unspecified whether esophagitis present     ICD-10-CM: K21.9  ICD-9-CM: 530.81     History of temporal arteritis     ICD-10-CM: Z87.39  ICD-9-CM: V12.59           Assessment/recommendation:     5.2 cm ascending aortic aneurysm without dissection or ulceration.  He has baseline dyspnea however is not in my opinion associated with the aneurysm.  He has no evidence of aortic valve disease, heart failure or coronary artery disease.  Based on the size of the aneurysm and his size and height I recommend proximal aortic aneurysm repair most likely with a dacron interposition graft.  I quoted an operative mortality less than 1% of his complications less than 5%.  He will need as part of the workup pulmonary function test which may help also to reveal an etiology of his dyspnea, he will need a cardiac cath and routine labs and other preoperative studies.  He wishes to proceed with surgery.  He has a large hepatic cyst however it is no significantly enlarged since the scan 2 years ago and he is at the present asymptomatic.  I am not sure that that needs to be addressed surgically at this point.  He will see Dr. Bojorquez from the cardiology team for general cardiology evaluation and to arrange for a cardiac cath  Temporal arteritis, no recent headaches but potentially could be relationship with the aneurysm    Thank you for allowing me to participate in his care.    Regards,    John Akhtar M.D.  I spent over 65 minutes before, during after the office visit in reviewing the record, examining the patient, reviewing and interpreting myself the echocardiogram chest CT, discussing the findings and options, discussed my recommendation of surgery, discussing the operation in detail with risk and benefits, discussing the  natural history of aneurysm disease and the guidelines for intervention and spent time in documenting in the electronic record and discussing the case with the cardiology team.

## 2024-01-31 ENCOUNTER — LAB (OUTPATIENT)
Dept: LAB | Facility: HOSPITAL | Age: 74
End: 2024-01-31
Payer: MEDICARE

## 2024-01-31 ENCOUNTER — TRANSCRIBE ORDERS (OUTPATIENT)
Dept: CARDIOLOGY | Facility: CLINIC | Age: 74
End: 2024-01-31

## 2024-01-31 ENCOUNTER — OFFICE VISIT (OUTPATIENT)
Dept: CARDIOLOGY | Facility: CLINIC | Age: 74
End: 2024-01-31
Payer: MEDICARE

## 2024-01-31 VITALS
HEART RATE: 72 BPM | DIASTOLIC BLOOD PRESSURE: 70 MMHG | HEIGHT: 68 IN | SYSTOLIC BLOOD PRESSURE: 140 MMHG | WEIGHT: 208 LBS | BODY MASS INDEX: 31.52 KG/M2

## 2024-01-31 DIAGNOSIS — Z01.810 PRE-OPERATIVE CARDIOVASCULAR EXAMINATION: Primary | ICD-10-CM

## 2024-01-31 DIAGNOSIS — I71.21 ANEURYSM OF ASCENDING AORTA WITHOUT RUPTURE: Primary | ICD-10-CM

## 2024-01-31 DIAGNOSIS — Z13.6 SCREENING FOR ISCHEMIC HEART DISEASE: ICD-10-CM

## 2024-01-31 DIAGNOSIS — Z01.810 PRE-OPERATIVE CARDIOVASCULAR EXAMINATION: ICD-10-CM

## 2024-01-31 DIAGNOSIS — R93.89 ABNORMAL FINDINGS ON DIAGNOSTIC IMAGING OF OTHER SPECIFIED BODY STRUCTURES: ICD-10-CM

## 2024-01-31 LAB
ANION GAP SERPL CALCULATED.3IONS-SCNC: 9.3 MMOL/L (ref 5–15)
BASOPHILS # BLD AUTO: 0.06 10*3/MM3 (ref 0–0.2)
BASOPHILS NFR BLD AUTO: 0.9 % (ref 0–1.5)
BUN SERPL-MCNC: 13 MG/DL (ref 8–23)
BUN/CREAT SERPL: 10.7 (ref 7–25)
CALCIUM SPEC-SCNC: 9.6 MG/DL (ref 8.6–10.5)
CHLORIDE SERPL-SCNC: 107 MMOL/L (ref 98–107)
CO2 SERPL-SCNC: 27.7 MMOL/L (ref 22–29)
CREAT SERPL-MCNC: 1.21 MG/DL (ref 0.76–1.27)
DEPRECATED RDW RBC AUTO: 43.1 FL (ref 37–54)
EGFRCR SERPLBLD CKD-EPI 2021: 62.4 ML/MIN/1.73
EOSINOPHIL # BLD AUTO: 0.35 10*3/MM3 (ref 0–0.4)
EOSINOPHIL NFR BLD AUTO: 5.4 % (ref 0.3–6.2)
ERYTHROCYTE [DISTWIDTH] IN BLOOD BY AUTOMATED COUNT: 12.6 % (ref 12.3–15.4)
GLUCOSE SERPL-MCNC: 97 MG/DL (ref 65–99)
HCT VFR BLD AUTO: 40.3 % (ref 37.5–51)
HGB BLD-MCNC: 13.5 G/DL (ref 13–17.7)
IMM GRANULOCYTES # BLD AUTO: 0.01 10*3/MM3 (ref 0–0.05)
IMM GRANULOCYTES NFR BLD AUTO: 0.2 % (ref 0–0.5)
LYMPHOCYTES # BLD AUTO: 2.06 10*3/MM3 (ref 0.7–3.1)
LYMPHOCYTES NFR BLD AUTO: 31.5 % (ref 19.6–45.3)
MCH RBC QN AUTO: 31 PG (ref 26.6–33)
MCHC RBC AUTO-ENTMCNC: 33.5 G/DL (ref 31.5–35.7)
MCV RBC AUTO: 92.6 FL (ref 79–97)
MONOCYTES # BLD AUTO: 0.62 10*3/MM3 (ref 0.1–0.9)
MONOCYTES NFR BLD AUTO: 9.5 % (ref 5–12)
NEUTROPHILS NFR BLD AUTO: 3.44 10*3/MM3 (ref 1.7–7)
NEUTROPHILS NFR BLD AUTO: 52.5 % (ref 42.7–76)
NRBC BLD AUTO-RTO: 0 /100 WBC (ref 0–0.2)
PLATELET # BLD AUTO: 202 10*3/MM3 (ref 140–450)
PMV BLD AUTO: 9.6 FL (ref 6–12)
POTASSIUM SERPL-SCNC: 4.1 MMOL/L (ref 3.5–5.2)
RBC # BLD AUTO: 4.35 10*6/MM3 (ref 4.14–5.8)
SODIUM SERPL-SCNC: 144 MMOL/L (ref 136–145)
WBC NRBC COR # BLD AUTO: 6.54 10*3/MM3 (ref 3.4–10.8)

## 2024-01-31 PROCEDURE — 85025 COMPLETE CBC W/AUTO DIFF WBC: CPT

## 2024-01-31 PROCEDURE — 93000 ELECTROCARDIOGRAM COMPLETE: CPT | Performed by: INTERNAL MEDICINE

## 2024-01-31 PROCEDURE — 80048 BASIC METABOLIC PNL TOTAL CA: CPT

## 2024-01-31 PROCEDURE — 99204 OFFICE O/P NEW MOD 45 MIN: CPT | Performed by: INTERNAL MEDICINE

## 2024-01-31 PROCEDURE — 36415 COLL VENOUS BLD VENIPUNCTURE: CPT

## 2024-01-31 RX ORDER — IPRATROPIUM BROMIDE 21 UG/1
2 SPRAY, METERED NASAL AS NEEDED
COMMUNITY

## 2024-01-31 NOTE — H&P (VIEW-ONLY)
Date of Office Visit: 24  Encounter Provider: Suhail Bojorquez MD  Place of Service: [facility] CARDIOLOGY  Patient Name: Nirav Szymanski  :1950  [id number]    Chief Complaint   Patient presents with    dilated aortic root        HPI: Nirav Szymanski is a 73 y.o. male he comes to see us as a new patient.  He had seen a cardiologist in [place] and in their evaluations he decided to get a CT of the chest and then they found he had a large ascending aortic aneurysm actually his root is okay and his aortic valve is trileaflet.  It measures between 5.2-5.3 in diameter.  He has not had any chest pain.  He has never had cardiac issues he does not smoke there is no diabetes no hypertension no hyperlipidemia.  He has not had any real family history of aneurysm disease it is possible that his grandfather had something but it sounds like he smoked and it was probably more of an abdominal aortic aneurysm.  He otherwise has been pretty healthy he is retired    Past Medical History:   Diagnosis Date    Cancer 2003    Prostate Cancer    Enlarged aorta        Past Surgical History:   Procedure Laterality Date    HERNIA REPAIR      NOSE SURGERY      PROSTATE SURGERY  2004    removal of prostate cancer    ROTATOR CUFF REPAIR Left     2005    TESTICLE SURGERY         Social History     Socioeconomic History    Marital status:    Tobacco Use    Smoking status: Never    Smokeless tobacco: Never   Vaping Use    Vaping Use: Never used   Substance and Sexual Activity    Alcohol use: Not Currently    Drug use: Never    Sexual activity: Defer       Family History   Problem Relation Age of Onset    Pancreatic cancer Mother     Aneurysm Maternal Grandfather        Review of Systems   Constitutional: Negative for decreased appetite, fever, malaise/fatigue and weight loss.   HENT:  Negative for nosebleeds.    Eyes:  Negative for double vision.   Cardiovascular:  Negative for chest pain, claudication,  cyanosis, dyspnea on exertion, irregular heartbeat, leg swelling, near-syncope, orthopnea, palpitations, paroxysmal nocturnal dyspnea and syncope.   Respiratory:  Negative for cough, hemoptysis and shortness of breath.    Hematologic/Lymphatic: Negative for bleeding problem.   Skin:  Negative for rash.   Musculoskeletal:  Negative for falls and myalgias.   Gastrointestinal:  Negative for hematochezia, jaundice, melena, nausea and vomiting.   Genitourinary:  Negative for hematuria.   Neurological:  Negative for dizziness and seizures.   Psychiatric/Behavioral:  Negative for altered mental status and memory loss.        Allergies   Allergen Reactions    Famotidine Swelling     Rash    Ranitidine Hcl Swelling     Swelling    Fexofenadine Rash     Skin itches/feels prickly         Current Outpatient Medications:     aspirin 81 MG chewable tablet, Chew 1 tablet Every Night., Disp: , Rfl:     B Complex Vitamins (VITAMIN B COMPLEX PO), Take  by mouth., Disp: , Rfl:     docusate sodium (COLACE) 250 MG capsule, Take 1 capsule by mouth Daily., Disp: , Rfl:     famotidine (PEPCID) 40 MG tablet, Take 0.5 tablets by mouth 2 (Two) Times a Day., Disp: , Rfl:     fexofenadine (ALLEGRA) 180 MG tablet, Take 1 tablet by mouth Every Night., Disp: , Rfl:     ipratropium (ATROVENT) 0.03 % nasal spray, 2 sprays into the nostril(s) as directed by provider As Needed for Rhinitis., Disp: , Rfl:     Krill Oil 500 MG capsule, Take  by mouth Every Night., Disp: , Rfl:     Magnesium 400 MG tablet, Take  by mouth Daily., Disp: , Rfl:     Turmeric (QC TUMERIC COMPLEX PO), Take  by mouth., Disp: , Rfl:     vitamin D3 125 MCG (5000 UT) capsule capsule, Take 1 capsule by mouth Daily., Disp: , Rfl:     aspirin 325 MG tablet, Take 81 mg by mouth Daily. (Patient not taking: Reported on 1/31/2024), Disp: , Rfl:     oseltamivir (TAMIFLU) 75 MG capsule, Take 1 capsule by mouth 2 (Two) Times a Day. (Patient not taking: Reported on 1/31/2024), Disp: 10  "capsule, Rfl: 0      Objective:     Vitals:    01/31/24 1158   BP: 140/70   Pulse: 72   Weight: 94.3 kg (208 lb)   Height: 172.7 cm (68\")     Body mass index is 31.63 kg/m².    Constitutional:       Appearance: Well-developed.   Eyes:      General: No scleral icterus.  HENT:      Head: Normocephalic.   Neck:      Thyroid: No thyromegaly.      Vascular: No JVD.      Lymphadenopathy: No cervical adenopathy.   Pulmonary:      Effort: Pulmonary effort is normal.      Breath sounds: Normal breath sounds. No wheezing. No rales.   Cardiovascular:      Normal rate. Regular rhythm.      No gallop.    Edema:     Peripheral edema absent.   Abdominal:      Palpations: Abdomen is soft.      Tenderness: There is no abdominal tenderness.   Musculoskeletal: Normal range of motion. Skin:     General: Skin is warm and dry.      Findings: No rash.   Neurological:      Mental Status: Alert and oriented to person, place, and time.           ECG 12 Lead    Date/Time: 1/31/2024 12:24 PM  Performed by: Suhail Bojorquez MD    Authorized by: Suhail Bojorquez MD  Comparison: compared with previous ECG   Similar to previous ECG  Rhythm: sinus rhythm  Ectopy: unifocal PVCs    Clinical impression: abnormal EKG           Assessment:       Diagnosis Plan   1. Aneurysm of ascending aorta without rupture  Case Request Cath Lab: Left Heart Cath      2. Abnormal findings on diagnostic imaging of other specified body structures  Case Request Cath Lab: Left Heart Cath             Plan:       Well he has an abnormal very large ascending aortic aneurysm 5.3 cm.  And it has been recommended that he have surgery on it.  He needs a preop left heart cath prior to the surgery as he will be going on bypass for it.  He otherwise is good and we of course will take care of him during the hospitalization I will get him set up for the heart cath on Monday I went over the risk versus benefits of it with him and his wife and they understand    No follow-ups on file. "     As always, it has been a pleasure to participate in your patient's care.      Sincerely,       Suhail Bojorquez MD

## 2024-01-31 NOTE — PROGRESS NOTES
Date of Office Visit: 24  Encounter Provider: Suhail Bojorquez MD  Place of Service: [facility] CARDIOLOGY  Patient Name: Nirav Szymanski  :1950  [id number]    Chief Complaint   Patient presents with    dilated aortic root        HPI: Nirav Szymanski is a 73 y.o. male he comes to see us as a new patient.  He had seen a cardiologist in [place] and in their evaluations he decided to get a CT of the chest and then they found he had a large ascending aortic aneurysm actually his root is okay and his aortic valve is trileaflet.  It measures between 5.2-5.3 in diameter.  He has not had any chest pain.  He has never had cardiac issues he does not smoke there is no diabetes no hypertension no hyperlipidemia.  He has not had any real family history of aneurysm disease it is possible that his grandfather had something but it sounds like he smoked and it was probably more of an abdominal aortic aneurysm.  He otherwise has been pretty healthy he is retired    Past Medical History:   Diagnosis Date    Cancer 2003    Prostate Cancer    Enlarged aorta        Past Surgical History:   Procedure Laterality Date    HERNIA REPAIR      NOSE SURGERY      PROSTATE SURGERY  2004    removal of prostate cancer    ROTATOR CUFF REPAIR Left     2005    TESTICLE SURGERY         Social History     Socioeconomic History    Marital status:    Tobacco Use    Smoking status: Never    Smokeless tobacco: Never   Vaping Use    Vaping Use: Never used   Substance and Sexual Activity    Alcohol use: Not Currently    Drug use: Never    Sexual activity: Defer       Family History   Problem Relation Age of Onset    Pancreatic cancer Mother     Aneurysm Maternal Grandfather        Review of Systems   Constitutional: Negative for decreased appetite, fever, malaise/fatigue and weight loss.   HENT:  Negative for nosebleeds.    Eyes:  Negative for double vision.   Cardiovascular:  Negative for chest pain, claudication,  cyanosis, dyspnea on exertion, irregular heartbeat, leg swelling, near-syncope, orthopnea, palpitations, paroxysmal nocturnal dyspnea and syncope.   Respiratory:  Negative for cough, hemoptysis and shortness of breath.    Hematologic/Lymphatic: Negative for bleeding problem.   Skin:  Negative for rash.   Musculoskeletal:  Negative for falls and myalgias.   Gastrointestinal:  Negative for hematochezia, jaundice, melena, nausea and vomiting.   Genitourinary:  Negative for hematuria.   Neurological:  Negative for dizziness and seizures.   Psychiatric/Behavioral:  Negative for altered mental status and memory loss.        Allergies   Allergen Reactions    Famotidine Swelling     Rash    Ranitidine Hcl Swelling     Swelling    Fexofenadine Rash     Skin itches/feels prickly         Current Outpatient Medications:     aspirin 81 MG chewable tablet, Chew 1 tablet Every Night., Disp: , Rfl:     B Complex Vitamins (VITAMIN B COMPLEX PO), Take  by mouth., Disp: , Rfl:     docusate sodium (COLACE) 250 MG capsule, Take 1 capsule by mouth Daily., Disp: , Rfl:     famotidine (PEPCID) 40 MG tablet, Take 0.5 tablets by mouth 2 (Two) Times a Day., Disp: , Rfl:     fexofenadine (ALLEGRA) 180 MG tablet, Take 1 tablet by mouth Every Night., Disp: , Rfl:     ipratropium (ATROVENT) 0.03 % nasal spray, 2 sprays into the nostril(s) as directed by provider As Needed for Rhinitis., Disp: , Rfl:     Krill Oil 500 MG capsule, Take  by mouth Every Night., Disp: , Rfl:     Magnesium 400 MG tablet, Take  by mouth Daily., Disp: , Rfl:     Turmeric (QC TUMERIC COMPLEX PO), Take  by mouth., Disp: , Rfl:     vitamin D3 125 MCG (5000 UT) capsule capsule, Take 1 capsule by mouth Daily., Disp: , Rfl:     aspirin 325 MG tablet, Take 81 mg by mouth Daily. (Patient not taking: Reported on 1/31/2024), Disp: , Rfl:     oseltamivir (TAMIFLU) 75 MG capsule, Take 1 capsule by mouth 2 (Two) Times a Day. (Patient not taking: Reported on 1/31/2024), Disp: 10  "capsule, Rfl: 0      Objective:     Vitals:    01/31/24 1158   BP: 140/70   Pulse: 72   Weight: 94.3 kg (208 lb)   Height: 172.7 cm (68\")     Body mass index is 31.63 kg/m².    Constitutional:       Appearance: Well-developed.   Eyes:      General: No scleral icterus.  HENT:      Head: Normocephalic.   Neck:      Thyroid: No thyromegaly.      Vascular: No JVD.      Lymphadenopathy: No cervical adenopathy.   Pulmonary:      Effort: Pulmonary effort is normal.      Breath sounds: Normal breath sounds. No wheezing. No rales.   Cardiovascular:      Normal rate. Regular rhythm.      No gallop.    Edema:     Peripheral edema absent.   Abdominal:      Palpations: Abdomen is soft.      Tenderness: There is no abdominal tenderness.   Musculoskeletal: Normal range of motion. Skin:     General: Skin is warm and dry.      Findings: No rash.   Neurological:      Mental Status: Alert and oriented to person, place, and time.           ECG 12 Lead    Date/Time: 1/31/2024 12:24 PM  Performed by: Suhail Bojorquez MD    Authorized by: Suhail Bojorquez MD  Comparison: compared with previous ECG   Similar to previous ECG  Rhythm: sinus rhythm  Ectopy: unifocal PVCs    Clinical impression: abnormal EKG           Assessment:       Diagnosis Plan   1. Aneurysm of ascending aorta without rupture  Case Request Cath Lab: Left Heart Cath      2. Abnormal findings on diagnostic imaging of other specified body structures  Case Request Cath Lab: Left Heart Cath             Plan:       Well he has an abnormal very large ascending aortic aneurysm 5.3 cm.  And it has been recommended that he have surgery on it.  He needs a preop left heart cath prior to the surgery as he will be going on bypass for it.  He otherwise is good and we of course will take care of him during the hospitalization I will get him set up for the heart cath on Monday I went over the risk versus benefits of it with him and his wife and they understand    No follow-ups on file. "     As always, it has been a pleasure to participate in your patient's care.      Sincerely,       Suhail Bojorquez MD

## 2024-02-05 ENCOUNTER — HOSPITAL ENCOUNTER (OUTPATIENT)
Facility: HOSPITAL | Age: 74
Setting detail: HOSPITAL OUTPATIENT SURGERY
Discharge: HOME | End: 2024-02-05
Attending: INTERNAL MEDICINE | Admitting: INTERNAL MEDICINE
Payer: MEDICARE

## 2024-02-05 VITALS
HEIGHT: 68 IN | SYSTOLIC BLOOD PRESSURE: 130 MMHG | TEMPERATURE: 98.2 F | OXYGEN SATURATION: 96 % | HEART RATE: 59 BPM | RESPIRATION RATE: 16 BRPM | WEIGHT: 206 LBS | BODY MASS INDEX: 31.22 KG/M2 | DIASTOLIC BLOOD PRESSURE: 69 MMHG

## 2024-02-05 DIAGNOSIS — R93.89 ABNORMAL FINDINGS ON DIAGNOSTIC IMAGING OF OTHER SPECIFIED BODY STRUCTURES: ICD-10-CM

## 2024-02-05 DIAGNOSIS — I71.21 ANEURYSM OF ASCENDING AORTA WITHOUT RUPTURE: ICD-10-CM

## 2024-02-05 PROCEDURE — C1894 INTRO/SHEATH, NON-LASER: HCPCS | Performed by: INTERNAL MEDICINE

## 2024-02-05 PROCEDURE — 25010000002 FENTANYL CITRATE (PF) 50 MCG/ML SOLUTION: Performed by: INTERNAL MEDICINE

## 2024-02-05 PROCEDURE — C1769 GUIDE WIRE: HCPCS | Performed by: INTERNAL MEDICINE

## 2024-02-05 PROCEDURE — 25010000002 MIDAZOLAM PER 1 MG: Performed by: INTERNAL MEDICINE

## 2024-02-05 PROCEDURE — 93458 L HRT ARTERY/VENTRICLE ANGIO: CPT | Performed by: INTERNAL MEDICINE

## 2024-02-05 PROCEDURE — 25810000003 SODIUM CHLORIDE 0.9 % SOLUTION: Performed by: INTERNAL MEDICINE

## 2024-02-05 PROCEDURE — 25510000001 IOPAMIDOL PER 1 ML: Performed by: INTERNAL MEDICINE

## 2024-02-05 PROCEDURE — 25010000002 HEPARIN (PORCINE) PER 1000 UNITS: Performed by: INTERNAL MEDICINE

## 2024-02-05 RX ORDER — SODIUM CHLORIDE 9 MG/ML
40 INJECTION, SOLUTION INTRAVENOUS AS NEEDED
Status: DISCONTINUED | OUTPATIENT
Start: 2024-02-05 | End: 2024-02-05 | Stop reason: HOSPADM

## 2024-02-05 RX ORDER — VERAPAMIL HYDROCHLORIDE 2.5 MG/ML
INJECTION, SOLUTION INTRAVENOUS
Status: DISCONTINUED | OUTPATIENT
Start: 2024-02-05 | End: 2024-02-05 | Stop reason: HOSPADM

## 2024-02-05 RX ORDER — SODIUM CHLORIDE 0.9 % (FLUSH) 0.9 %
10 SYRINGE (ML) INJECTION AS NEEDED
Status: DISCONTINUED | OUTPATIENT
Start: 2024-02-05 | End: 2024-02-05 | Stop reason: HOSPADM

## 2024-02-05 RX ORDER — FENTANYL CITRATE 50 UG/ML
INJECTION, SOLUTION INTRAMUSCULAR; INTRAVENOUS
Status: DISCONTINUED | OUTPATIENT
Start: 2024-02-05 | End: 2024-02-05 | Stop reason: HOSPADM

## 2024-02-05 RX ORDER — ACETAMINOPHEN 325 MG/1
650 TABLET ORAL EVERY 4 HOURS PRN
Status: DISCONTINUED | OUTPATIENT
Start: 2024-02-05 | End: 2024-02-05 | Stop reason: HOSPADM

## 2024-02-05 RX ORDER — HEPARIN SODIUM 1000 [USP'U]/ML
INJECTION, SOLUTION INTRAVENOUS; SUBCUTANEOUS
Status: DISCONTINUED | OUTPATIENT
Start: 2024-02-05 | End: 2024-02-05 | Stop reason: HOSPADM

## 2024-02-05 RX ORDER — MIDAZOLAM HYDROCHLORIDE 1 MG/ML
INJECTION INTRAMUSCULAR; INTRAVENOUS
Status: DISCONTINUED | OUTPATIENT
Start: 2024-02-05 | End: 2024-02-05 | Stop reason: HOSPADM

## 2024-02-05 RX ORDER — SODIUM CHLORIDE 9 MG/ML
75 INJECTION, SOLUTION INTRAVENOUS CONTINUOUS
Status: DISCONTINUED | OUTPATIENT
Start: 2024-02-05 | End: 2024-02-05 | Stop reason: HOSPADM

## 2024-02-05 RX ORDER — SODIUM CHLORIDE 0.9 % (FLUSH) 0.9 %
10 SYRINGE (ML) INJECTION EVERY 12 HOURS SCHEDULED
Status: DISCONTINUED | OUTPATIENT
Start: 2024-02-05 | End: 2024-02-05 | Stop reason: HOSPADM

## 2024-02-05 RX ORDER — LIDOCAINE HYDROCHLORIDE 20 MG/ML
INJECTION, SOLUTION INFILTRATION; PERINEURAL
Status: DISCONTINUED | OUTPATIENT
Start: 2024-02-05 | End: 2024-02-05 | Stop reason: HOSPADM

## 2024-02-05 RX ADMIN — SODIUM CHLORIDE 75 ML/HR: 9 INJECTION, SOLUTION INTRAVENOUS at 08:15

## 2024-02-05 NOTE — INTERVAL H&P NOTE
H&P reviewed. The patient was examined and there are no changes to the H&P.   H&P reviewed. The patient was examined and there are no changes to the H&P. I have explained the risks and benefits of the procedure to the patient.  The patient understands and agrees to proceed

## 2024-02-05 NOTE — Clinical Note
Hemostasis started on the right radial artery. R-Band was used in achieving hemostasis. Radial compression device applied to vessel. Hemostasis achieved successfully. Closure device additional comment: Vasc Band

## 2024-02-05 NOTE — DISCHARGE INSTRUCTIONS
[facility]  [address] [place] [address]    Coronary Angiogram (Radial/Ulnar Approach) After Care    Refer to this sheet in the next few weeks. These instructions provide you with information on caring for yourself after your procedure. Your caregiver may also give you more specific instructions. Your treatment has been planned according to current medical practices, but problems sometimes occur. Call your caregiver if you have any problems or questions after your procedure.    Home Care Instructions:  You may shower the day after the procedure. Remove the bandage (dressing) and gently wash the site with plain soap and water. Gently pat the site dry. You may apply a band aid daily for 2 days if desired.    Do not apply powder or lotion to the site.  Do not submerge the affected site in water for 3 to 5 days or until the site is completely healed.   Do not lift, push or pull anything over 5 pounds for 5 days after your procedure or as directed by your physician.  As a reference, a gallon of milk weighs 8 pounds.   Inspect the site at least twice daily. You may notice some bruising at the site and it may be tender for 1 to 2 weeks.     Increase your fluid intake for the next 2 days.    Keep arm elevated for 24 hours. For the remainder of the day, keep your arm in “Pledge of Allegiance” position when up and about.     You may drive 24 hours after the procedure unless otherwise instructed by your caregiver.  Do not operate machinery or power tools for 24 hours.  A responsible adult should be with you for the first 24 hours after you arrive home. Do not make any important legal decisions or sign legal papers for 24 hours.  Do not drink alcohol for 24 hours.    Metformin or any medications containing Metformin should not be taken for 48 hours after your procedure.      Call Your Doctor if:   You have unusual pain at the radial/ulnar (wrist) site.  You have redness, warmth, swelling, or pain at the  radial/ulnar (wrist) site.  You have drainage (other than a small amount of blood on the dressing).  `You have chills or a fever > 101.  Your arm becomes pale or dark, cool, tingly, or numb.  You develop chest pain, shortness of breath, feel faint or pass out.    You have heavy bleeding from the site, hold pressure on the site for 20 minutes.  If the bleeding stops, apply a fresh bandage and call your cardiologist.  However, if you        continue to have bleeding, call 911 and continue to apply pressure to the site.   You have any symptoms of a stroke.  Remember BE FAST  B-balance. Sudden trouble walking or loss of balance.  E-eyes.  Sudden changes in how you see or a sudden onset of a very bad headache.   F-face. Sudden weakness or loss of feeling of the face or facial droop on one side.   A-arms Sudden weakness or numbness in one arm.  One arm drifts down if they are both held out in front of you. This happens suddenly and usually on one side of the body.   S-speech.  Sudden trouble speaking, slurred speech or trouble understanding what are saying.   T-time  Time to call emergency services.  Write down the symptoms and the time they started.

## 2024-02-08 ENCOUNTER — PREP FOR SURGERY (OUTPATIENT)
Dept: OTHER | Facility: HOSPITAL | Age: 74
End: 2024-02-08
Payer: MEDICARE

## 2024-02-08 ENCOUNTER — TELEPHONE (OUTPATIENT)
Dept: CARDIAC SURGERY | Facility: CLINIC | Age: 74
End: 2024-02-08
Payer: MEDICARE

## 2024-02-08 DIAGNOSIS — I79.8 OTHER DISORDERS OF ARTERIES, ARTERIOLES AND CAPILLARIES IN DISEASES CLASSIFIED ELSEWHERE: ICD-10-CM

## 2024-02-08 DIAGNOSIS — I71.21 ANEURYSM OF ASCENDING AORTA WITHOUT RUPTURE: Primary | ICD-10-CM

## 2024-02-08 DIAGNOSIS — R79.9 ABNORMAL FINDING OF BLOOD CHEMISTRY, UNSPECIFIED: ICD-10-CM

## 2024-02-08 DIAGNOSIS — Z01.818 PREOP TESTING: Primary | ICD-10-CM

## 2024-02-08 DIAGNOSIS — R79.1 ABNORMAL COAGULATION PROFILE: ICD-10-CM

## 2024-02-08 DIAGNOSIS — R06.09 DYSPNEA ON EXERTION: ICD-10-CM

## 2024-02-08 RX ORDER — CHLORHEXIDINE GLUCONATE 500 MG/1
CLOTH TOPICAL EVERY 12 HOURS PRN
OUTPATIENT
Start: 2024-02-08

## 2024-02-08 RX ORDER — CHLORHEXIDINE GLUCONATE ORAL RINSE 1.2 MG/ML
15 SOLUTION DENTAL ONCE
OUTPATIENT
Start: 2024-02-08 | End: 2024-02-08

## 2024-02-08 RX ORDER — CEFAZOLIN SODIUM 2 G/100ML
2000 INJECTION, SOLUTION INTRAVENOUS ONCE
OUTPATIENT
Start: 2024-02-08 | End: 2024-02-08

## 2024-02-08 RX ORDER — CHLORHEXIDINE GLUCONATE ORAL RINSE 1.2 MG/ML
15 SOLUTION DENTAL EVERY 12 HOURS
OUTPATIENT
Start: 2024-02-08 | End: 2024-02-09

## 2024-02-09 ENCOUNTER — TELEPHONE (OUTPATIENT)
Dept: CARDIAC SURGERY | Facility: CLINIC | Age: 74
End: 2024-02-09
Payer: MEDICARE

## 2024-02-09 NOTE — TELEPHONE ENCOUNTER
Spoke with , Discussed appt times, dates, and prep. Advised to stop all vitamins including tumeric and krill oil 10 days prior to surgery.    Scheduled on 2/26/2024:  PFT at 6:30am patient to arrive at 6:15am  PAT at 7:30am  Carotid duplex 8:45 am    PFT PREP:  No Alcohol/Caffiene 4hrs prior  No Breathing treatments 4hrs prior  No Smoking 1hr prior to test  No exercise 30mins prior to test  Wear loose non restrictive clothing (chest)    Surgery scheduled on 2/28/24 at 7:30 am Patient to arrive at 5 am.     Advised should he have any further questions or concerns to contact the office. He verbalized understanding and this was agreeable.

## 2024-02-19 RX ORDER — ASPIRIN 81 MG/1
81 TABLET ORAL DAILY
COMMUNITY

## 2024-02-20 ENCOUNTER — ANESTHESIA (OUTPATIENT)
Dept: GASTROENTEROLOGY | Facility: HOSPITAL | Age: 74
End: 2024-02-20
Payer: MEDICARE

## 2024-02-20 ENCOUNTER — ANESTHESIA EVENT (OUTPATIENT)
Dept: GASTROENTEROLOGY | Facility: HOSPITAL | Age: 74
End: 2024-02-20
Payer: MEDICARE

## 2024-02-20 ENCOUNTER — HOSPITAL ENCOUNTER (OUTPATIENT)
Facility: HOSPITAL | Age: 74
Setting detail: HOSPITAL OUTPATIENT SURGERY
Discharge: HOME | End: 2024-02-20
Attending: INTERNAL MEDICINE | Admitting: INTERNAL MEDICINE
Payer: MEDICARE

## 2024-02-20 VITALS
BODY MASS INDEX: 30.66 KG/M2 | RESPIRATION RATE: 16 BRPM | TEMPERATURE: 98 F | HEIGHT: 68 IN | HEART RATE: 64 BPM | WEIGHT: 202.3 LBS | DIASTOLIC BLOOD PRESSURE: 74 MMHG | SYSTOLIC BLOOD PRESSURE: 106 MMHG | OXYGEN SATURATION: 97 %

## 2024-02-20 DIAGNOSIS — D12.3 ADENOMATOUS POLYP OF TRANSVERSE COLON: ICD-10-CM

## 2024-02-20 PROCEDURE — 25810000003 LACTATED RINGERS PER 1000 ML: Performed by: INTERNAL MEDICINE

## 2024-02-20 PROCEDURE — S0260 H&P FOR SURGERY: HCPCS | Performed by: INTERNAL MEDICINE

## 2024-02-20 PROCEDURE — 25010000002 PROPOFOL 10 MG/ML EMULSION: Performed by: NURSE ANESTHETIST, CERTIFIED REGISTERED

## 2024-02-20 PROCEDURE — 88305 TISSUE EXAM BY PATHOLOGIST: CPT | Performed by: INTERNAL MEDICINE

## 2024-02-20 RX ORDER — SODIUM CHLORIDE, SODIUM LACTATE, POTASSIUM CHLORIDE, CALCIUM CHLORIDE 600; 310; 30; 20 MG/100ML; MG/100ML; MG/100ML; MG/100ML
1000 INJECTION, SOLUTION INTRAVENOUS CONTINUOUS
Status: DISCONTINUED | OUTPATIENT
Start: 2024-02-20 | End: 2024-02-20 | Stop reason: HOSPADM

## 2024-02-20 RX ORDER — LIDOCAINE HYDROCHLORIDE 20 MG/ML
INJECTION, SOLUTION INFILTRATION; PERINEURAL AS NEEDED
Status: DISCONTINUED | OUTPATIENT
Start: 2024-02-20 | End: 2024-02-20 | Stop reason: SURG

## 2024-02-20 RX ORDER — PROPOFOL 10 MG/ML
VIAL (ML) INTRAVENOUS CONTINUOUS PRN
Status: DISCONTINUED | OUTPATIENT
Start: 2024-02-20 | End: 2024-02-20 | Stop reason: SURG

## 2024-02-20 RX ADMIN — SODIUM CHLORIDE, POTASSIUM CHLORIDE, SODIUM LACTATE AND CALCIUM CHLORIDE 1000 ML: 600; 310; 30; 20 INJECTION, SOLUTION INTRAVENOUS at 07:06

## 2024-02-20 RX ADMIN — PROPOFOL 400 MG: 10 INJECTION, EMULSION INTRAVENOUS at 07:55

## 2024-02-20 RX ADMIN — LIDOCAINE HYDROCHLORIDE 60 MG: 20 INJECTION, SOLUTION INFILTRATION; PERINEURAL at 07:33

## 2024-02-20 RX ADMIN — PROPOFOL 140 MCG/KG/MIN: 10 INJECTION, EMULSION INTRAVENOUS at 07:33

## 2024-02-20 NOTE — ANESTHESIA PREPROCEDURE EVALUATION
Anesthesia Evaluation     Patient summary reviewed and Nursing notes reviewed   history of anesthetic complications:  PONV               Airway   Mallampati: II  TM distance: >3 FB  Neck ROM: full  Dental      Pulmonary - negative pulmonary ROS   Cardiovascular     ECG reviewed  Rhythm: regular  Rate: normal    (+) dysrhythmias PVC      Neuro/Psych- negative ROS  GI/Hepatic/Renal/Endo    (+) GERD, liver disease fatty liver disease    Musculoskeletal     Abdominal    Substance History - negative use     OB/GYN negative ob/gyn ROS         Other   arthritis,   history of cancer                  Anesthesia Plan    ASA 2     MAC     intravenous induction     Anesthetic plan, risks, benefits, and alternatives have been provided, discussed and informed consent has been obtained with: patient.    Plan discussed with CRNA.      CODE STATUS:

## 2024-02-20 NOTE — ANESTHESIA POSTPROCEDURE EVALUATION
Patient: Nirav Szymanski    Procedure Summary       Date: 02/20/24 Room / Location: [facility] ENDOSCOPY 6 / [facility] ENDOSCOPY    Anesthesia Start: 0730 Anesthesia Stop: 0800    Procedure: COLONOSCOPY to cecum and into the terminal ileum with cold snare polypectomies Diagnosis:       Adenomatous polyp of transverse colon      (Adenomatous polyp of transverse colon [D12.3])    Surgeons: Lexx Hill MD Provider: José Miguel Valentin MD    Anesthesia Type: MAC ASA Status: 2            Anesthesia Type: MAC    Vitals  Vitals Value Taken Time   /74 02/20/24 0820   Temp     Pulse 63 02/20/24 0821   Resp 16 02/20/24 0819   SpO2 96 % 02/20/24 0821   Vitals shown include unfiled device data.        Post Anesthesia Care and Evaluation    Patient location during evaluation: PACU  Patient participation: complete - patient participated  Level of consciousness: awake and alert  Pain management: adequate    Airway patency: patent  Anesthetic complications: No anesthetic complications    Cardiovascular status: acceptable  Respiratory status: acceptable  Hydration status: acceptable    Comments: --------------------            02/20/24 0819     --------------------   BP:       106/74     Pulse:      64       Resp:       16       Temp:                SpO2:      97%      --------------------

## 2024-02-20 NOTE — DISCHARGE INSTRUCTIONS
For the next 24 hours patient needs to be with a responsible adult.    For 24 hours DO NOT drive, operate machinery, appliances, drink alcohol, make important decisions or sign legal documents.    Start with a light or bland diet if you are feeling sick to your stomach otherwise advance to regular diet as tolerated.    Follow recommendations on procedure report if provided by your doctor.    Call Dr Hill for problems [phone]    Problems may include but not limited to: large amounts of bleeding, trouble breathing, repeated vomiting, severe unrelieved pain, fever or chills.

## 2024-02-20 NOTE — H&P
"[facility]  Pre Procedure History & Physical    Chief Complaint:   Time for my colonoscopy    Subjective     HPI:   74 y.o. male presenting to endoscopy unit today for screening colonoscopy.    Past Medical History:   Past Medical History:   Diagnosis Date    AAA (abdominal aortic aneurysm)     Cancer 2003    Prostate Cancer    Colon polyps     Enlarged aorta     PONV (postoperative nausea and vomiting)        Family History:  Family History   Problem Relation Age of Onset    Pancreatic cancer Mother     Aneurysm Maternal Grandfather     Malig Hyperthermia Neg Hx        Social History:   reports that he has never smoked. He has never used smokeless tobacco. He reports that he does not currently use alcohol. He reports that he does not use drugs.    Medications:   Medications Prior to Admission   Medication Sig Dispense Refill Last Dose    aspirin 81 MG EC tablet Take 1 tablet by mouth Daily.   2/12/2024    B Complex Vitamins (VITAMIN B COMPLEX PO) Take 1 tablet by mouth Daily.   Past Week    docusate sodium (COLACE) 250 MG capsule Take 1 capsule by mouth Daily.   Past Week    famotidine (PEPCID) 40 MG tablet Take 0.5 tablets by mouth 2 (Two) Times a Day.   Past Week    fexofenadine (ALLEGRA) 180 MG tablet Take 1 tablet by mouth Every Night.   Past Week    ipratropium (ATROVENT) 0.03 % nasal spray 2 sprays into the nostril(s) as directed by provider As Needed for Rhinitis.   Past Week    Magnesium 400 MG tablet Take  by mouth Daily.   Past Week    Turmeric (QC TUMERIC COMPLEX PO) Take 1 tablet by mouth Daily.   2/17/2024    vitamin D3 125 MCG (5000 UT) capsule capsule Take 1 capsule by mouth Daily.   Past Week       Allergies:  Famotidine, Ranitidine hcl, and Fexofenadine    Objective     Blood pressure 138/83, pulse 75, temperature 98 °F (36.7 °C), temperature source Oral, resp. rate 16, height 172.7 cm (68\"), weight 91.8 kg (202 lb 4.8 oz), SpO2 94%.  Physical Exam:   General: patient " awake, alert and cooperative    Assessment & Plan     Diagnosis:  screening    Anticipated Surgical Procedure:  Colonoscopy    The risks, benefits, and alternatives of this procedure have been discussed with the patient or the responsible party- the patient understands and agrees to proceed.

## 2024-02-21 LAB
LAB AP CASE REPORT: NORMAL
PATH REPORT.FINAL DX SPEC: NORMAL
PATH REPORT.GROSS SPEC: NORMAL

## 2024-02-26 ENCOUNTER — HOSPITAL ENCOUNTER (OUTPATIENT)
Dept: GENERAL RADIOLOGY | Facility: HOSPITAL | Age: 74
Discharge: HOME | End: 2024-02-26
Payer: MEDICARE

## 2024-02-26 ENCOUNTER — PRE-ADMISSION TESTING (OUTPATIENT)
Dept: PREADMISSION TESTING | Facility: HOSPITAL | Age: 74
DRG: 221 | End: 2024-02-26
Payer: MEDICARE

## 2024-02-26 ENCOUNTER — HOSPITAL ENCOUNTER (OUTPATIENT)
Dept: CARDIOLOGY | Facility: HOSPITAL | Age: 74
Discharge: HOME | End: 2024-02-26
Payer: MEDICARE

## 2024-02-26 ENCOUNTER — ANESTHESIA EVENT (OUTPATIENT)
Dept: PERIOP | Facility: HOSPITAL | Age: 74
DRG: 221 | End: 2024-02-26
Payer: MEDICARE

## 2024-02-26 ENCOUNTER — HOSPITAL ENCOUNTER (OUTPATIENT)
Dept: RESPIRATORY THERAPY | Facility: HOSPITAL | Age: 74
Discharge: HOME | End: 2024-02-26
Payer: MEDICARE

## 2024-02-26 VITALS
DIASTOLIC BLOOD PRESSURE: 82 MMHG | OXYGEN SATURATION: 98 % | HEART RATE: 75 BPM | HEIGHT: 66 IN | WEIGHT: 205.7 LBS | TEMPERATURE: 98.7 F | BODY MASS INDEX: 33.06 KG/M2 | SYSTOLIC BLOOD PRESSURE: 165 MMHG | RESPIRATION RATE: 20 BRPM

## 2024-02-26 DIAGNOSIS — I71.21 ANEURYSM OF ASCENDING AORTA WITHOUT RUPTURE: ICD-10-CM

## 2024-02-26 DIAGNOSIS — Z01.818 PREOP TESTING: ICD-10-CM

## 2024-02-26 DIAGNOSIS — R79.9 ABNORMAL FINDING OF BLOOD CHEMISTRY, UNSPECIFIED: ICD-10-CM

## 2024-02-26 DIAGNOSIS — I79.8 OTHER DISORDERS OF ARTERIES, ARTERIOLES AND CAPILLARIES IN DISEASES CLASSIFIED ELSEWHERE: ICD-10-CM

## 2024-02-26 DIAGNOSIS — R79.1 ABNORMAL COAGULATION PROFILE: ICD-10-CM

## 2024-02-26 DIAGNOSIS — R06.09 DYSPNEA ON EXERTION: ICD-10-CM

## 2024-02-26 LAB
ABO GROUP BLD: NORMAL
ALBUMIN SERPL-MCNC: 4.2 G/DL (ref 3.5–5.2)
ALBUMIN/GLOB SERPL: 1.6 G/DL
ALP SERPL-CCNC: 67 U/L (ref 39–117)
ALT SERPL W P-5'-P-CCNC: 9 U/L (ref 1–41)
ANION GAP SERPL CALCULATED.3IONS-SCNC: 10.5 MMOL/L (ref 5–15)
APTT PPP: 29.5 SECONDS (ref 22.7–35.4)
ARTERIAL PATENCY WRIST A: POSITIVE
AST SERPL-CCNC: 16 U/L (ref 1–40)
ATMOSPHERIC PRESS: 746.2 MMHG
BACTERIA UR QL AUTO: NORMAL /HPF
BASE EXCESS BLDA CALC-SCNC: -1.9 MMOL/L (ref 0–2)
BASOPHILS # BLD AUTO: 0.06 10*3/MM3 (ref 0–0.2)
BASOPHILS NFR BLD AUTO: 1 % (ref 0–1.5)
BDY SITE: ABNORMAL
BH CV XLRA MEAS LEFT DIST CCA EDV: 18.6 CM/SEC
BH CV XLRA MEAS LEFT DIST CCA PSV: 91.9 CM/SEC
BH CV XLRA MEAS LEFT DIST ICA EDV: -22.4 CM/SEC
BH CV XLRA MEAS LEFT DIST ICA PSV: -82.6 CM/SEC
BH CV XLRA MEAS LEFT ICA/CCA RATIO: -1.33
BH CV XLRA MEAS LEFT MID ICA EDV: -16.1 CM/SEC
BH CV XLRA MEAS LEFT MID ICA PSV: -121.9 CM/SEC
BH CV XLRA MEAS LEFT PROX CCA EDV: -18.4 CM/SEC
BH CV XLRA MEAS LEFT PROX CCA PSV: -152 CM/SEC
BH CV XLRA MEAS LEFT PROX ECA PSV: -177.6 CM/SEC
BH CV XLRA MEAS LEFT PROX ICA EDV: -18.6 CM/SEC
BH CV XLRA MEAS LEFT PROX ICA PSV: -77 CM/SEC
BH CV XLRA MEAS LEFT PROX SCLA PSV: 107.9 CM/SEC
BH CV XLRA MEAS LEFT VERTEBRAL A EDV: -13.5 CM/SEC
BH CV XLRA MEAS LEFT VERTEBRAL A PSV: -48 CM/SEC
BH CV XLRA MEAS RIGHT DIST CCA EDV: 16.1 CM/SEC
BH CV XLRA MEAS RIGHT DIST CCA PSV: 75.7 CM/SEC
BH CV XLRA MEAS RIGHT DIST ICA EDV: -25.5 CM/SEC
BH CV XLRA MEAS RIGHT DIST ICA PSV: -86.4 CM/SEC
BH CV XLRA MEAS RIGHT ICA/CCA RATIO: -1.31
BH CV XLRA MEAS RIGHT MID ICA EDV: -29.2 CM/SEC
BH CV XLRA MEAS RIGHT MID ICA PSV: -98.8 CM/SEC
BH CV XLRA MEAS RIGHT PROX CCA EDV: -16.8 CM/SEC
BH CV XLRA MEAS RIGHT PROX CCA PSV: -135.9 CM/SEC
BH CV XLRA MEAS RIGHT PROX ECA PSV: -122.6 CM/SEC
BH CV XLRA MEAS RIGHT PROX ICA EDV: -18.7 CM/SEC
BH CV XLRA MEAS RIGHT PROX ICA PSV: -66.4 CM/SEC
BH CV XLRA MEAS RIGHT PROX SCLA PSV: 100.9 CM/SEC
BH CV XLRA MEAS RIGHT VERTEBRAL A EDV: -9.5 CM/SEC
BH CV XLRA MEAS RIGHT VERTEBRAL A PSV: -32.7 CM/SEC
BILIRUB SERPL-MCNC: 0.6 MG/DL (ref 0–1.2)
BILIRUB UR QL STRIP: NEGATIVE
BLD GP AB SCN SERPL QL: NEGATIVE
BUN SERPL-MCNC: 13 MG/DL (ref 8–23)
BUN/CREAT SERPL: 12.5 (ref 7–25)
CALCIUM SPEC-SCNC: 8.9 MG/DL (ref 8.6–10.5)
CHLORIDE SERPL-SCNC: 106 MMOL/L (ref 98–107)
CHOLEST SERPL-MCNC: 147 MG/DL (ref 0–200)
CLARITY UR: CLEAR
CLOSE TME COLL+ADP + EPINEP PNL BLD: 96 % (ref 86–100)
CO2 BLDA-SCNC: 23.2 MMOL/L (ref 23–27)
CO2 SERPL-SCNC: 24.5 MMOL/L (ref 22–29)
COLOR UR: YELLOW
CREAT SERPL-MCNC: 1.04 MG/DL (ref 0.76–1.27)
DEPRECATED RDW RBC AUTO: 40.9 FL (ref 37–54)
EGFRCR SERPLBLD CKD-EPI 2021: 74.4 ML/MIN/1.73
EOSINOPHIL # BLD AUTO: 0.3 10*3/MM3 (ref 0–0.4)
EOSINOPHIL NFR BLD AUTO: 4.9 % (ref 0.3–6.2)
ERYTHROCYTE [DISTWIDTH] IN BLOOD BY AUTOMATED COUNT: 12.7 % (ref 12.3–15.4)
GLOBULIN UR ELPH-MCNC: 2.7 GM/DL
GLUCOSE SERPL-MCNC: 107 MG/DL (ref 65–99)
GLUCOSE UR STRIP-MCNC: NEGATIVE MG/DL
HBA1C MFR BLD: 4.9 % (ref 4.8–5.6)
HCO3 BLDA-SCNC: 22.1 MMOL/L (ref 22–28)
HCT VFR BLD AUTO: 37.2 % (ref 37.5–51)
HDLC SERPL-MCNC: 48 MG/DL (ref 40–60)
HEMODILUTION: NO
HGB BLD-MCNC: 12.5 G/DL (ref 13–17.7)
HGB UR QL STRIP.AUTO: ABNORMAL
HOLD SPECIMEN: NORMAL
HYALINE CASTS UR QL AUTO: NORMAL /LPF
IMM GRANULOCYTES # BLD AUTO: 0.02 10*3/MM3 (ref 0–0.05)
IMM GRANULOCYTES NFR BLD AUTO: 0.3 % (ref 0–0.5)
INR PPP: 1.06 (ref 0.9–1.1)
KETONES UR QL STRIP: NEGATIVE
LDLC SERPL CALC-MCNC: 87 MG/DL (ref 0–100)
LDLC/HDLC SERPL: 1.81 {RATIO}
LEFT ARM BP: NORMAL MMHG
LEUKOCYTE ESTERASE UR QL STRIP.AUTO: NEGATIVE
LYMPHOCYTES # BLD AUTO: 2.25 10*3/MM3 (ref 0.7–3.1)
LYMPHOCYTES NFR BLD AUTO: 36.5 % (ref 19.6–45.3)
MAGNESIUM SERPL-MCNC: 2.2 MG/DL (ref 1.6–2.4)
MCH RBC QN AUTO: 30.2 PG (ref 26.6–33)
MCHC RBC AUTO-ENTMCNC: 33.6 G/DL (ref 31.5–35.7)
MCV RBC AUTO: 89.9 FL (ref 79–97)
MODALITY: ABNORMAL
MONOCYTES # BLD AUTO: 0.48 10*3/MM3 (ref 0.1–0.9)
MONOCYTES NFR BLD AUTO: 7.8 % (ref 5–12)
NEUTROPHILS NFR BLD AUTO: 3.06 10*3/MM3 (ref 1.7–7)
NEUTROPHILS NFR BLD AUTO: 49.5 % (ref 42.7–76)
NITRITE UR QL STRIP: NEGATIVE
NRBC BLD AUTO-RTO: 0 /100 WBC (ref 0–0.2)
NT-PROBNP SERPL-MCNC: 133 PG/ML (ref 0–1800)
PCO2 BLDA: 34.5 MM HG (ref 35–45)
PH BLDA: 7.42 PH UNITS (ref 7.35–7.45)
PH UR STRIP.AUTO: 5.5 [PH] (ref 5–8)
PLATELET # BLD AUTO: 192 10*3/MM3 (ref 140–450)
PMV BLD AUTO: 9.3 FL (ref 6–12)
PO2 BLDA: 71 MM HG (ref 80–100)
POTASSIUM SERPL-SCNC: 3.3 MMOL/L (ref 3.5–5.2)
PROT SERPL-MCNC: 6.9 G/DL (ref 6–8.5)
PROT UR QL STRIP: NEGATIVE
PROTHROMBIN TIME: 13.9 SECONDS (ref 11.7–14.2)
QT INTERVAL: 398 MS
QTC INTERVAL: 420 MS
RBC # BLD AUTO: 4.14 10*6/MM3 (ref 4.14–5.8)
RBC # UR STRIP: NORMAL /HPF
REF LAB TEST METHOD: NORMAL
RH BLD: NEGATIVE
RIGHT ARM BP: NORMAL MMHG
SAO2 % BLDCOA: 94.4 % (ref 92–98.5)
SARS-COV-2 RNA RESP QL NAA+PROBE: NOT DETECTED
SET MECH RESP RATE: 18
SODIUM SERPL-SCNC: 141 MMOL/L (ref 136–145)
SP GR UR STRIP: 1.02 (ref 1–1.03)
SQUAMOUS #/AREA URNS HPF: NORMAL /HPF
T&S EXPIRATION DATE: NORMAL
TRIGL SERPL-MCNC: 60 MG/DL (ref 0–150)
UROBILINOGEN UR QL STRIP: ABNORMAL
VLDLC SERPL-MCNC: 12 MG/DL (ref 5–40)
WBC # UR STRIP: NORMAL /HPF
WBC NRBC COR # BLD AUTO: 6.17 10*3/MM3 (ref 3.4–10.8)

## 2024-02-26 PROCEDURE — 87635 SARS-COV-2 COVID-19 AMP PRB: CPT

## 2024-02-26 PROCEDURE — 71046 X-RAY EXAM CHEST 2 VIEWS: CPT

## 2024-02-26 PROCEDURE — 83036 HEMOGLOBIN GLYCOSYLATED A1C: CPT

## 2024-02-26 PROCEDURE — 83735 ASSAY OF MAGNESIUM: CPT

## 2024-02-26 PROCEDURE — 86923 COMPATIBILITY TEST ELECTRIC: CPT

## 2024-02-26 PROCEDURE — 94729 DIFFUSING CAPACITY: CPT

## 2024-02-26 PROCEDURE — 86901 BLOOD TYPING SEROLOGIC RH(D): CPT

## 2024-02-26 PROCEDURE — 93880 EXTRACRANIAL BILAT STUDY: CPT

## 2024-02-26 PROCEDURE — 94726 PLETHYSMOGRAPHY LUNG VOLUMES: CPT

## 2024-02-26 PROCEDURE — 36415 COLL VENOUS BLD VENIPUNCTURE: CPT

## 2024-02-26 PROCEDURE — 82803 BLOOD GASES ANY COMBINATION: CPT

## 2024-02-26 PROCEDURE — 80053 COMPREHEN METABOLIC PANEL: CPT

## 2024-02-26 PROCEDURE — 85025 COMPLETE CBC W/AUTO DIFF WBC: CPT

## 2024-02-26 PROCEDURE — 93005 ELECTROCARDIOGRAM TRACING: CPT

## 2024-02-26 PROCEDURE — 86850 RBC ANTIBODY SCREEN: CPT

## 2024-02-26 PROCEDURE — 94060 EVALUATION OF WHEEZING: CPT

## 2024-02-26 PROCEDURE — 94640 AIRWAY INHALATION TREATMENT: CPT

## 2024-02-26 PROCEDURE — 93010 ELECTROCARDIOGRAM REPORT: CPT | Performed by: INTERNAL MEDICINE

## 2024-02-26 PROCEDURE — 85730 THROMBOPLASTIN TIME PARTIAL: CPT

## 2024-02-26 PROCEDURE — 85610 PROTHROMBIN TIME: CPT

## 2024-02-26 PROCEDURE — 85576 BLOOD PLATELET AGGREGATION: CPT

## 2024-02-26 PROCEDURE — S0260 H&P FOR SURGERY: HCPCS | Performed by: PHYSICIAN ASSISTANT

## 2024-02-26 PROCEDURE — 80061 LIPID PANEL: CPT

## 2024-02-26 PROCEDURE — 36600 WITHDRAWAL OF ARTERIAL BLOOD: CPT

## 2024-02-26 PROCEDURE — 86900 BLOOD TYPING SEROLOGIC ABO: CPT

## 2024-02-26 PROCEDURE — 83880 ASSAY OF NATRIURETIC PEPTIDE: CPT

## 2024-02-26 PROCEDURE — 81001 URINALYSIS AUTO W/SCOPE: CPT

## 2024-02-26 RX ORDER — LIDOCAINE HYDROCHLORIDE 10 MG/ML
0.5 INJECTION, SOLUTION INFILTRATION; PERINEURAL ONCE AS NEEDED
Status: CANCELLED | OUTPATIENT
Start: 2024-02-26

## 2024-02-26 RX ORDER — CHLORHEXIDINE GLUCONATE 500 MG/1
CLOTH TOPICAL EVERY 12 HOURS PRN
Status: ACTIVE | OUTPATIENT
Start: 2024-02-26

## 2024-02-26 RX ORDER — MIDAZOLAM HYDROCHLORIDE 1 MG/ML
0.5 INJECTION INTRAMUSCULAR; INTRAVENOUS
Status: CANCELLED | OUTPATIENT
Start: 2024-02-26

## 2024-02-26 RX ORDER — CHLORHEXIDINE GLUCONATE ORAL RINSE 1.2 MG/ML
15 SOLUTION DENTAL EVERY 12 HOURS
Status: DISPENSED | OUTPATIENT
Start: 2024-02-26 | End: 2024-02-27

## 2024-02-26 RX ORDER — SODIUM CHLORIDE 0.9 % (FLUSH) 0.9 %
3 SYRINGE (ML) INJECTION EVERY 12 HOURS SCHEDULED
Status: CANCELLED | OUTPATIENT
Start: 2024-02-26

## 2024-02-26 RX ORDER — SODIUM CHLORIDE 0.9 % (FLUSH) 0.9 %
3-10 SYRINGE (ML) INJECTION AS NEEDED
Status: CANCELLED | OUTPATIENT
Start: 2024-02-26

## 2024-02-26 RX ORDER — FAMOTIDINE 20 MG/1
20 TABLET, FILM COATED ORAL 2 TIMES DAILY
Status: ON HOLD | COMMUNITY

## 2024-02-26 RX ORDER — FENTANYL CITRATE 50 UG/ML
50 INJECTION, SOLUTION INTRAMUSCULAR; INTRAVENOUS ONCE AS NEEDED
Status: CANCELLED | OUTPATIENT
Start: 2024-02-26

## 2024-02-26 RX ORDER — ALBUTEROL SULFATE 2.5 MG/3ML
2.5 SOLUTION RESPIRATORY (INHALATION) ONCE
Status: COMPLETED | OUTPATIENT
Start: 2024-02-26 | End: 2024-02-26

## 2024-02-26 RX ORDER — CHLORHEXIDINE GLUCONATE ORAL RINSE 1.2 MG/ML
15 SOLUTION DENTAL TAKE AS DIRECTED
Status: ON HOLD | COMMUNITY

## 2024-02-26 RX ORDER — ACETAMINOPHEN 500 MG
1000 TABLET ORAL ONCE
Status: CANCELLED | OUTPATIENT
Start: 2024-02-28

## 2024-02-26 RX ORDER — SODIUM CHLORIDE, SODIUM LACTATE, POTASSIUM CHLORIDE, CALCIUM CHLORIDE 600; 310; 30; 20 MG/100ML; MG/100ML; MG/100ML; MG/100ML
9 INJECTION, SOLUTION INTRAVENOUS CONTINUOUS
Status: CANCELLED | OUTPATIENT
Start: 2024-02-26

## 2024-02-26 RX ADMIN — ALBUTEROL SULFATE 2.5 MG: 2.5 SOLUTION RESPIRATORY (INHALATION) at 06:40

## 2024-02-26 NOTE — DISCHARGE INSTRUCTIONS
CHLORHEXIDINE CLOTH INSTRUCTIONS  The morning of surgery follow these instructions using the Chlorhexidine cloths you've been given.  These steps reduce bacteria on the body.  Do not use the cloths near your eyes, ears mouth, genitalia or on open wounds.  Throw the cloths away after use but do not try to flush them down a toilet.      Open and remove one cloth at a time from the package.    Leave the cloth unfolded and begin the bathing.  Massage the skin with the cloths using gentle pressure to remove bacteria.  Do not scrub harshly.   Follow the steps below with one 2% CHG cloth per area (6 total cloths).  One cloth for neck, shoulders and chest.  One cloth for both arms, hands, fingers and underarms (do underarms last).  One cloth for the abdomen followed by groin.  One cloth for right leg and foot including between the toes.  One cloth for left leg and foot including between the toes.  The last cloth is to be used for the back of the neck, back and buttocks.    Allow the CHG to air dry 3 minutes on the skin which will give it time to work and decrease the chance of irritation.  The skin may feel sticky until it is dry.  Do not rinse with water or any other liquid or you will lose the beneficial effects of the CHG.  If mild skin irritation occurs, do rinse the skin to remove the CHG.  Report this to the nurse at time of admission.  Do not apply lotions, creams, ointments, deodorants or perfumes after using the clothes. Dress in clean clothes before coming to the hospital.    BACTROBAN NASAL OINTMENT  There are many germs normally in your nose. Bactroban is an ointment that will help reduce these germs. Please follow these instructions for Bactroban use:          ___1ST_The day before surgery in the evening              Date_____2/27___    ___2ND_The day of surgery in the morning    Date____2/28____    **Squirt ½ package of Bactroban Ointment onto a cotton applicator and apply to inside of 1st nostril.  Squirt the  remaining Bactroban and apply to the inside of the other nostril.    PERIDEX- ORAL:  Use only if your surgeon has ordered  Use the night before and morning of surgery - Swish, gargle, and spit - do not swallow.    Take the following medications the morning of surgery with a small sip of water:  NONE UNLESS INSTRUCTED BY CARDIOLOGY NP    ARRIVAL TIME 0500 ON 2/28/24       If you are on prescription narcotic pain medication to control your pain you may also take that medication the morning of surgery.    General Instructions:  Do not eat or drink anything after midnight the night before surgery.  Infants may have breast milk up to four hours before surgery.  Infants drinking formula may drink formula up to six hours before surgery.   Patients who avoid smoking, chewing tobacco and alcohol for 4 weeks prior to surgery have a reduced risk of post-operative complications.  Quit smoking as many days before surgery as you can.  Do not smoke, use chewing tobacco or drink alcohol the day of surgery.   If applicable bring your C-PAP/ BI-PAP machine in with you to preop day of surgery.  Bring any papers given to you in the doctor’s office.  Wear clean comfortable clothes.  Do not wear contact lenses, false eyelashes or make-up.  Bring a case for your glasses.   Bring crutches or walker if applicable.  Remove all piercings.  Leave jewelry and any other valuables at home.  Hair extensions with metal clips must be removed prior to surgery.  The Pre-Admission Testing nurse will instruct you to bring medications if unable to obtain an accurate list in Pre-Admission Testing.        If you were given a blood bank ID arm band remember to bring it with you the day of surgery.    Preventing a Surgical Site Infection:  For 2 to 3 days before surgery, avoid shaving with a razor because the razor can irritate skin and make it easier to develop an infection.    Any areas of open skin can increase the risk of a post-operative wound infection  by allowing bacteria to enter and travel throughout the body.  Notify your surgeon if you have any skin wounds / rashes even if it is not near the expected surgical site.  The area will need assessed to determine if surgery should be delayed until it is healed.  The night prior to surgery shower using a fresh bar of anti-bacterial soap (such as Dial) and clean washcloth.  Sleep in a clean bed with clean clothing.  Do not allow pets to sleep with you.  Shower on the morning of surgery using a fresh bar of anti-bacterial soap (such as Dial) and clean washcloth.  Dry with a clean towel and dress in clean clothing.  Ask your surgeon if you will be receiving antibiotics prior to surgery.  Make sure you, your family, and all healthcare providers clean their hands with soap and water or an alcohol based hand  before caring for you or your wound.    Day of surgery:  Your arrival time is approximately two hours before your scheduled surgery time.  Upon arrival, a Pre-op nurse and Anesthesiologist will review your health history, obtain vital signs, and answer questions you may have.  The only belongings needed at this time will be your home medications and if applicable your C-PAP/BI-PAP machine.  A Pre-op nurse will start an IV and you may receive medication in preparation for surgery, including something to help you relax.      Please be aware that surgery does come with discomfort.  We want to make every effort to control your discomfort so please discuss any uncontrolled symptoms with your nurse.   Your doctor will most likely have prescribed pain medications.      If you are going home after surgery you will receive individualized written care instructions before being discharged.  A responsible adult must drive you to and from the hospital on the day of your surgery and ideally stay with you through the night.  Discharge prescriptions can be filled by the hospital pharmacy during regular pharmacy hours.  If  you are having surgery late in the day/evening your prescription may be e-prescribed to your pharmacy.  Please verify your pharmacy hours or chose a 24 hour pharmacy to avoid not having access to your prescription because your pharmacy has closed for the day.    If you are staying overnight following surgery, you will be transported to your hospital room following the recovery period.  [facility] has all private rooms.    If you have any questions please call Pre-Admission Testing at [phone].  Deductibles and co-payments are collected on the day of service. Please be prepared to pay the required co-pay, deductible or deposit on the day of service as defined by your plan.    Call your surgeon immediately if you experience any of the following symptoms:  Sore Throat  Shortness of Breath or difficulty breathing  Cough  Chills  Body soreness or muscle pain  Headache  Fever  New loss of taste or smell  Do not arrive for your surgery ill.  Your procedure will need to be rescheduled to another time.  You will need to call your physician before the day of surgery to avoid any unnecessary exposure to hospital staff as well as other patients.

## 2024-02-26 NOTE — H&P
1/28/2024     Seen on 1/26/2024     Reason for consultation:   Evaluation of ascending aortic aneurysm     Chief Complaint   Same     History of Present Illness:                                        Dear Dr. Bender, Hai Daniel* and Colleagues,  It was nice to see Nirav Szymanski in consultation at your request. He is a 73 y.o. male with GERD, liver cyst, temporal arteritis and flank pain in the past who has a known aneurysm which was recently evaluated somewhere in [place] and the CT scan revealed a slightly dilated aortic root and an ascending aortic aneurysm approaching 5.2 cm without dissection or ulceration.  The CT scan also does not show a descending thoracic aortic aneurysm.  It does show a cyst which is around 11 to 12 cm and in my comparison with the previous scan is the same size.  In the past there was consideration about surgery because of symptoms but he has remained asymptomatic episode over 1 year ago. He denies syncope, TIA, orthopnea, PND or lower extremity edema, he denies chest pain or upper back pain.  He denies a family history of aneurysms, dissections or connective tissue disorders.  Has a history of temporal arteritis but he denies frequent headaches or other rheumatologic associated findings.  He had a 2D echocardiogram that showed an ejection fraction of 55 to 60% mild to moderate tricuspid regurgitation and mild AI sclerosis of the aortic valve without regurgitation or stenosis.  He was off for an operation by a cardiac surgeon due to the size of his aneurysm.  He was uncomfortable due to the experience with surgery in that place and he decided to come to [place] for further attention.  He is to see a GI doctor for his liver cyst.         Patient Active Problem List   Diagnosis    Right flank pain    Vitamin D deficiency    Vasomotor rhinitis    Spinal stenosis of lumbar region    Liver cyst    History of temporal arteritis    GERD (gastroesophageal reflux disease)    DDD  (degenerative disc disease), lumbar    Aortic root dilatation    Aneurysm of ascending aorta    Allergic rhinitis    Adenomatous polyp of colon    Hernia of abdominal wall         Medical History        Past Medical History:   Diagnosis Date    Cancer 2003     Prostate Cancer    Enlarged aorta              Surgical History         Past Surgical History:   Procedure Laterality Date    HERNIA REPAIR        NOSE SURGERY        PROSTATE SURGERY   2004     removal of prostate cancer    ROTATOR CUFF REPAIR Left       2005    TESTICLE SURGERY                      Allergies   Allergen Reactions    Famotidine Swelling       Rash    Ranitidine Hcl Swelling       Swelling    Fexofenadine Rash       Skin itches/feels prickly            Current Outpatient Medications:     aspirin 325 MG tablet, Take 81 mg by mouth Daily., Disp: , Rfl:     aspirin 81 MG chewable tablet, Chew 1 tablet Every Night., Disp: , Rfl:     B Complex Vitamins (VITAMIN B COMPLEX PO), Take  by mouth., Disp: , Rfl:     docusate sodium (COLACE) 250 MG capsule, Take 1 capsule by mouth Daily., Disp: , Rfl:     famotidine (PEPCID) 40 MG tablet, Take 0.5 tablets by mouth 2 (Two) Times a Day., Disp: , Rfl:     fexofenadine (ALLEGRA) 180 MG tablet, Take 1 tablet by mouth Every Night., Disp: , Rfl:     Krill Oil 1000 MG capsule, Take  by mouth Every Night., Disp: , Rfl:     Magnesium 125 MG capsule, Take  by mouth Daily., Disp: , Rfl:     oseltamivir (TAMIFLU) 75 MG capsule, Take 1 capsule by mouth 2 (Two) Times a Day., Disp: 10 capsule, Rfl: 0    Turmeric (QC TUMERIC COMPLEX PO), Take  by mouth., Disp: , Rfl:     vitamin D3 125 MCG (5000 UT) capsule capsule, Take 1 capsule by mouth Daily., Disp: , Rfl:      Social History   Social History           Socioeconomic History    Marital status:    Tobacco Use    Smoking status: Never    Smokeless tobacco: Never   Vaping Use    Vaping Use: Never used   Substance and Sexual Activity    Alcohol use: Not Currently     Drug use: Never    Sexual activity: Defer            Family history is negative for aneurysms, dissections or connective tissue disorders     Review of Systems:  As HPI, otherwise noncontributory     Physical Exam:     Vital Signs:  Weight: 94.3 kg (208 lb)   Body mass index is 31.63 kg/m².  Temp: 97.3 °F (36.3 °C)   Heart Rate: 70   BP: 162/92      Constitutional:       Appearance: Well-developed.   Eyes:      Conjunctiva/sclera: Conjunctivae normal.      Pupils: Pupils are equal, round, and reactive to light.   HENT:      Head: Normocephalic and atraumatic.      Nose: Nose normal.   Neck:      Thyroid: No thyromegaly.      Vascular: No JVD.      Lymphadenopathy: No cervical adenopathy.   Pulmonary:      Effort: Pulmonary effort is normal.      Breath sounds: Normal breath sounds. No rales.   Cardiovascular:      Normal rate. Regular rhythm.      Murmurs: There is no murmur.      No gallop.    Pulses:     Intact distal pulses. No decreased pulses.   Edema:     Peripheral edema absent.   Abdominal:      General: Bowel sounds are normal. There is no distension.      Palpations: Abdomen is soft. There is no abdominal mass.      Tenderness: There is no abdominal tenderness.   Musculoskeletal: Normal range of motion.         General: No tenderness or deformity.      Cervical back: Normal range of motion and neck supple. Skin:     General: Skin is warm and dry.      Findings: No erythema or rash.   Neurological:      Mental Status: Alert and oriented to person, place, and time.      Deep Tendon Reflexes: Reflexes are normal and symmetric.   Psychiatric:         Behavior: Behavior normal.            Relevant studies (other than HPI)         Assessment:      Problems Addressed this Visit                  Cardiac and Vasculature     Aortic root dilatation - Primary     Aneurysm of ascending aorta          Gastrointestinal Abdominal      Right flank pain     Liver cyst     GERD (gastroesophageal reflux disease)           Musculoskeletal and Injuries     History of temporal arteritis      Diagnoses           Codes Comments     Aortic root dilatation    -  Primary ICD-10-CM: I77.810  ICD-9-CM: 447.71       Aneurysm of ascending aorta without rupture     ICD-10-CM: I71.21  ICD-9-CM: 441.2       Right flank pain     ICD-10-CM: R10.9  ICD-9-CM: 789.09       Liver cyst     ICD-10-CM: K76.89  ICD-9-CM: 573.8       Gastroesophageal reflux disease, unspecified whether esophagitis present     ICD-10-CM: K21.9  ICD-9-CM: 530.81       History of temporal arteritis     ICD-10-CM: Z87.39  ICD-9-CM: V12.59               Assessment/recommendation:      5.2 cm ascending aortic aneurysm without dissection or ulceration.  He has baseline dyspnea however is not in my opinion associated with the aneurysm.  He has no evidence of aortic valve disease, heart failure or coronary artery disease.  Based on the size of the aneurysm and his size and height I recommend proximal aortic aneurysm repair most likely with a dacron interposition graft.  I quoted an operative mortality less than 1% of his complications less than 5%.  He will need as part of the workup pulmonary function test which may help also to reveal an etiology of his dyspnea, he will need a cardiac cath and routine labs and other preoperative studies.  He wishes to proceed with surgery.  He has a large hepatic cyst however it is no significantly enlarged since the scan 2 years ago and he is at the present asymptomatic.  I am not sure that that needs to be addressed surgically at this point.  He will see Dr. Bojorquez from the cardiology team for general cardiology evaluation and to arrange for a cardiac cath  Temporal arteritis, no recent headaches but potentially could be relationship with the aneurysm     Thank you for allowing me to participate in his care.     Regards,     John Akhtar M.D.  I spent over 65 minutes before, during after the office visit in reviewing the record, examining the  patient, reviewing and interpreting myself the echocardiogram chest CT, discussing the findings and options, discussed my recommendation of surgery, discussing the operation in detail with risk and benefits, discussing the natural history of aneurysm disease and the guidelines for intervention and spent time in documenting in the electronic record and discussing the case with the cardiology team.      ------------------------------------------  Addendum:  Patient seen today in [facility] in preparation for surgery. He denies any medical changes since seeing Dr. Akhtar in the office and above note completed. He denies any recent cough, fever/chills, or nausea/vomiting. He underwent cardiac cath by Dr. Bojorquez on 2/5/24 and it showed normal coronaries. He also had a colonoscopy on 2/20/24 by Dr. Hill that showed polyps and plan for 3 year follow-up. Pre-op labs and imaging reviewed. CXR and carotid duplex pending. COVID swab negative. UA negative. Plans for surgery discussed and questions answered; patient and spouse voiced understanding. Instructed not to take any medications the morning of surgery. He is scheduled for ascending aortic aneurysm repair by Dr. Akhtar on Wednesday.

## 2024-02-26 NOTE — ANESTHESIA PREPROCEDURE EVALUATION
Anesthesia Evaluation     history of anesthetic complications:  PONV  NPO Solid Status: > 8 hours  NPO Liquid Status: > 2 hours           Airway   Mallampati: II  TM distance: >3 FB  Neck ROM: full  Dental - normal exam     Pulmonary    Cardiovascular   Exercise tolerance: good (4-7 METS)    ECG reviewed  Rhythm: regular  Rate: normal      ROS comment: 5.2 cm ascending aortic aneurysm   Left Ventricle: Normal left ventricular systolic function with a   visually estimated EF of 55 - 60%. Left ventricle size is normal. Mildly   increased wall thickness.   ·  Left Ventricle: Normal wall motion.   ·  Aorta: Normal sized abdominal aorta. Mildly dilated aortic root. Ao   root diameter is 3.9 cm. Moderately dilated ascending aorta. Ao ascending   diameter is 5.3 cm, consistent with CTA of Chest (12/8/2023) and Previous   echo (5/8/2023). Descending Thoracic aorta measures 3.9 cm.   ·  Left Ventricle: Grade I diastolic dysfunction.   ·  Left Atrium: Left atrium is mildly dilated.   ·  Aortic Valve: Mild sclerosis of the aortic valve, left cusp.   ·  Mitral Valve: Mild regurgitation.   ·  Tricuspid Valve: Mildly elevated RVSP. The estimated RVSP is 35 mmHg.   ·  Interatrial Septum: No interatrial shunt visualized with color Doppler.   ·  Interatrial Septum: Atrial septal aneurysm present.   ·  Aorta: Normal sized abdominal aorta. Mildly dilated aortic root. Ao   root diameter is 3.9 cm. Moderately dilated ascending aorta. Ao ascending   diameter is 5.3 cm, consistent with CTA of Chest (12/8/2023) and Previous   echo (5/8/2023).Descending Thoracic aorta measures 3.9 cm.   ·  Pericardium: No pericardial effusion.   ·  Image quality is fair.     Normal cath    Neuro/Psych  GI/Hepatic/Renal/Endo    (+) GERD, liver disease    Musculoskeletal     Abdominal    Substance History      OB/GYN          Other   arthritis,                       Anesthesia Plan    ASA 4     Il[place], CVL and PAC     (CELESTINE)  intravenous induction    Postoperative Plan: Expected vent after surgery  Anesthetic plan, risks, benefits, and alternatives have been provided, discussed and informed consent has been obtained with: patient.    Use of blood products discussed with patient .        CODE STATUS:

## 2024-02-28 ENCOUNTER — ANESTHESIA (OUTPATIENT)
Dept: PERIOP | Facility: HOSPITAL | Age: 74
DRG: 221 | End: 2024-02-28
Payer: MEDICARE

## 2024-02-28 ENCOUNTER — APPOINTMENT (OUTPATIENT)
Dept: GENERAL RADIOLOGY | Facility: HOSPITAL | Age: 74
DRG: 221 | End: 2024-02-28
Payer: MEDICARE

## 2024-02-28 ENCOUNTER — ANCILLARY PROCEDURE (OUTPATIENT)
Dept: PERIOP | Facility: HOSPITAL | Age: 74
DRG: 221 | End: 2024-02-28
Payer: MEDICARE

## 2024-02-28 ENCOUNTER — HOSPITAL ENCOUNTER (INPATIENT)
Facility: HOSPITAL | Age: 74
LOS: 6 days | Discharge: HOME HEALTH | DRG: 221 | End: 2024-03-05
Attending: THORACIC SURGERY (CARDIOTHORACIC VASCULAR SURGERY) | Admitting: THORACIC SURGERY (CARDIOTHORACIC VASCULAR SURGERY)
Payer: MEDICARE

## 2024-02-28 DIAGNOSIS — I71.21 ANEURYSM OF ASCENDING AORTA WITHOUT RUPTURE: ICD-10-CM

## 2024-02-28 DIAGNOSIS — Z86.79 S/P ASCENDING AORTIC ANEURYSM REPAIR: Primary | ICD-10-CM

## 2024-02-28 DIAGNOSIS — Z98.890 S/P ASCENDING AORTIC ANEURYSM REPAIR: Primary | ICD-10-CM

## 2024-02-28 LAB
ABO GROUP BLD: NORMAL
ACT BLD: 136 SECONDS (ref 82–152)
ACT BLD: 147 SECONDS (ref 82–152)
ACT BLD: 369 SECONDS (ref 82–152)
ACT BLD: 401 SECONDS (ref 82–152)
ACT BLD: 428 SECONDS (ref 82–152)
ACT BLD: 520 SECONDS (ref 82–152)
ACT BLD: 531 SECONDS (ref 82–152)
ALBUMIN SERPL-MCNC: 3.8 G/DL (ref 3.5–5.2)
ALBUMIN SERPL-MCNC: 4.3 G/DL (ref 3.5–5.2)
ANION GAP SERPL CALCULATED.3IONS-SCNC: 9 MMOL/L (ref 5–15)
ANION GAP SERPL CALCULATED.3IONS-SCNC: 9 MMOL/L (ref 5–15)
APTT PPP: 30.5 SECONDS (ref 22.7–35.4)
APTT PPP: 33.1 SECONDS (ref 22.7–35.4)
ARTERIAL PATENCY WRIST A: ABNORMAL
ARTERIAL PATENCY WRIST A: ABNORMAL
ATMOSPHERIC PRESS: 748.4 MMHG
ATMOSPHERIC PRESS: 752 MMHG
BASE EXCESS BLDA CALC-SCNC: -1 MMOL/L (ref -5–5)
BASE EXCESS BLDA CALC-SCNC: -1 MMOL/L (ref -5–5)
BASE EXCESS BLDA CALC-SCNC: -1.8 MMOL/L (ref 0–2)
BASE EXCESS BLDA CALC-SCNC: -2 MMOL/L (ref -5–5)
BASE EXCESS BLDA CALC-SCNC: -3 MMOL/L (ref -5–5)
BASE EXCESS BLDA CALC-SCNC: 0 MMOL/L (ref -5–5)
BASE EXCESS BLDA CALC-SCNC: 0 MMOL/L (ref -5–5)
BASE EXCESS BLDA CALC-SCNC: 0.7 MMOL/L (ref 0–2)
BASE EXCESS BLDA CALC-SCNC: 1 MMOL/L (ref -5–5)
BASOPHILS # BLD AUTO: 0.02 10*3/MM3 (ref 0–0.2)
BASOPHILS NFR BLD AUTO: 0.2 % (ref 0–1.5)
BDY SITE: ABNORMAL
BDY SITE: ABNORMAL
BUN SERPL-MCNC: 13 MG/DL (ref 8–23)
BUN SERPL-MCNC: 14 MG/DL (ref 8–23)
BUN/CREAT SERPL: 10.4 (ref 7–25)
BUN/CREAT SERPL: 12.3 (ref 7–25)
CA-I BLD-MCNC: 4.8 MG/DL (ref 4.6–5.4)
CA-I BLDA-SCNC: ABNORMAL MMOL/L
CA-I SERPL ISE-MCNC: 1.19 MMOL/L (ref 1.15–1.35)
CALCIUM SPEC-SCNC: 8.6 MG/DL (ref 8.6–10.5)
CALCIUM SPEC-SCNC: 8.7 MG/DL (ref 8.6–10.5)
CHLORIDE SERPL-SCNC: 108 MMOL/L (ref 98–107)
CHLORIDE SERPL-SCNC: 112 MMOL/L (ref 98–107)
CO2 BLDA-SCNC: 25 MMOL/L (ref 24–29)
CO2 BLDA-SCNC: 25 MMOL/L (ref 24–29)
CO2 BLDA-SCNC: 25.9 MMOL/L (ref 23–27)
CO2 BLDA-SCNC: 26 MMOL/L (ref 24–29)
CO2 BLDA-SCNC: 27.3 MMOL/L (ref 23–27)
CO2 BLDA-SCNC: 28 MMOL/L (ref 24–29)
CO2 SERPL-SCNC: 24 MMOL/L (ref 22–29)
CO2 SERPL-SCNC: 25 MMOL/L (ref 22–29)
CREAT SERPL-MCNC: 1.14 MG/DL (ref 0.76–1.27)
CREAT SERPL-MCNC: 1.25 MG/DL (ref 0.76–1.27)
DEPRECATED RDW RBC AUTO: 42.8 FL (ref 37–54)
DEPRECATED RDW RBC AUTO: 43.7 FL (ref 37–54)
DEPRECATED RDW RBC AUTO: 44.2 FL (ref 37–54)
DEPRECATED RDW RBC AUTO: 45 FL (ref 37–54)
DEVICE COMMENT: ABNORMAL
EGFRCR SERPLBLD CKD-EPI 2021: 59.7 ML/MIN/1.73
EGFRCR SERPLBLD CKD-EPI 2021: 66.7 ML/MIN/1.73
EOSINOPHIL # BLD AUTO: 0.08 10*3/MM3 (ref 0–0.4)
EOSINOPHIL NFR BLD AUTO: 0.7 % (ref 0.3–6.2)
ERYTHROCYTE [DISTWIDTH] IN BLOOD BY AUTOMATED COUNT: 12.8 % (ref 12.3–15.4)
ERYTHROCYTE [DISTWIDTH] IN BLOOD BY AUTOMATED COUNT: 12.9 % (ref 12.3–15.4)
ERYTHROCYTE [DISTWIDTH] IN BLOOD BY AUTOMATED COUNT: 13 % (ref 12.3–15.4)
ERYTHROCYTE [DISTWIDTH] IN BLOOD BY AUTOMATED COUNT: 13.1 % (ref 12.3–15.4)
FIBRINOGEN PPP-MCNC: 300 MG/DL (ref 219–464)
FIBRINOGEN PPP-MCNC: 365 MG/DL (ref 219–464)
GLUCOSE BLDC GLUCOMTR-MCNC: 100 MG/DL (ref 70–130)
GLUCOSE BLDC GLUCOMTR-MCNC: 113 MG/DL (ref 70–130)
GLUCOSE BLDC GLUCOMTR-MCNC: 122 MG/DL (ref 70–130)
GLUCOSE BLDC GLUCOMTR-MCNC: 126 MG/DL (ref 70–130)
GLUCOSE BLDC GLUCOMTR-MCNC: 129 MG/DL (ref 70–130)
GLUCOSE BLDC GLUCOMTR-MCNC: 133 MG/DL (ref 70–130)
GLUCOSE BLDC GLUCOMTR-MCNC: 133 MG/DL (ref 70–130)
GLUCOSE BLDC GLUCOMTR-MCNC: 137 MG/DL (ref 70–130)
GLUCOSE BLDC GLUCOMTR-MCNC: 144 MG/DL (ref 70–130)
GLUCOSE BLDC GLUCOMTR-MCNC: 149 MG/DL (ref 70–130)
GLUCOSE BLDC GLUCOMTR-MCNC: 151 MG/DL (ref 70–130)
GLUCOSE BLDC GLUCOMTR-MCNC: 154 MG/DL (ref 70–130)
GLUCOSE BLDC GLUCOMTR-MCNC: 155 MG/DL (ref 70–130)
GLUCOSE BLDC GLUCOMTR-MCNC: 159 MG/DL (ref 70–130)
GLUCOSE BLDC GLUCOMTR-MCNC: 163 MG/DL (ref 70–130)
GLUCOSE BLDC GLUCOMTR-MCNC: 218 MG/DL (ref 70–130)
GLUCOSE BLDC GLUCOMTR-MCNC: 229 MG/DL (ref 70–130)
GLUCOSE SERPL-MCNC: 140 MG/DL (ref 65–99)
GLUCOSE SERPL-MCNC: 141 MG/DL (ref 65–99)
HCO3 BLDA-SCNC: 24 MMOL/L (ref 22–26)
HCO3 BLDA-SCNC: 24 MMOL/L (ref 22–26)
HCO3 BLDA-SCNC: 24.3 MMOL/L (ref 22–26)
HCO3 BLDA-SCNC: 24.4 MMOL/L (ref 22–28)
HCO3 BLDA-SCNC: 24.5 MMOL/L (ref 22–26)
HCO3 BLDA-SCNC: 24.6 MMOL/L (ref 22–26)
HCO3 BLDA-SCNC: 25 MMOL/L (ref 22–26)
HCO3 BLDA-SCNC: 26 MMOL/L (ref 22–28)
HCO3 BLDA-SCNC: 26.7 MMOL/L (ref 22–26)
HCT VFR BLD AUTO: 26.6 % (ref 37.5–51)
HCT VFR BLD AUTO: 31.1 % (ref 37.5–51)
HCT VFR BLD AUTO: 32.4 % (ref 37.5–51)
HCT VFR BLD AUTO: 32.9 % (ref 37.5–51)
HCT VFR BLDA CALC: 26 % (ref 38–51)
HCT VFR BLDA CALC: 26 % (ref 38–51)
HCT VFR BLDA CALC: 27 % (ref 38–51)
HCT VFR BLDA CALC: 27 % (ref 38–51)
HCT VFR BLDA CALC: 28 % (ref 38–51)
HCT VFR BLDA CALC: 28 % (ref 38–51)
HCT VFR BLDA CALC: 34 % (ref 38–51)
HEMODILUTION: NO
HEMODILUTION: NO
HGB BLD-MCNC: 10.8 G/DL (ref 13–17.7)
HGB BLD-MCNC: 11 G/DL (ref 13–17.7)
HGB BLD-MCNC: 11 G/DL (ref 13–17.7)
HGB BLD-MCNC: 8.8 G/DL (ref 13–17.7)
HGB BLDA-MCNC: 11.6 G/DL (ref 12–17)
HGB BLDA-MCNC: 8.8 G/DL (ref 12–17)
HGB BLDA-MCNC: 8.8 G/DL (ref 12–17)
HGB BLDA-MCNC: 9.2 G/DL (ref 12–17)
HGB BLDA-MCNC: 9.2 G/DL (ref 12–17)
HGB BLDA-MCNC: 9.5 G/DL (ref 12–17)
HGB BLDA-MCNC: 9.5 G/DL (ref 12–17)
IMM GRANULOCYTES # BLD AUTO: 0.08 10*3/MM3 (ref 0–0.05)
IMM GRANULOCYTES NFR BLD AUTO: 0.7 % (ref 0–0.5)
INHALED O2 CONCENTRATION: 100 %
INHALED O2 CONCENTRATION: 40 %
INR PPP: 1.4 (ref 0.8–1.2)
INR PPP: 1.42 (ref 0.9–1.1)
INR PPP: 1.57 (ref 0.9–1.1)
LYMPHOCYTES # BLD AUTO: 0.84 10*3/MM3 (ref 0.7–3.1)
LYMPHOCYTES NFR BLD AUTO: 7.4 % (ref 19.6–45.3)
MAGNESIUM SERPL-MCNC: 2.5 MG/DL (ref 1.6–2.4)
MAGNESIUM SERPL-MCNC: 2.8 MG/DL (ref 1.6–2.4)
MCH RBC QN AUTO: 31.1 PG (ref 26.6–33)
MCH RBC QN AUTO: 31.2 PG (ref 26.6–33)
MCH RBC QN AUTO: 31.3 PG (ref 26.6–33)
MCH RBC QN AUTO: 31.9 PG (ref 26.6–33)
MCHC RBC AUTO-ENTMCNC: 33.1 G/DL (ref 31.5–35.7)
MCHC RBC AUTO-ENTMCNC: 33.4 G/DL (ref 31.5–35.7)
MCHC RBC AUTO-ENTMCNC: 34 G/DL (ref 31.5–35.7)
MCHC RBC AUTO-ENTMCNC: 34.7 G/DL (ref 31.5–35.7)
MCV RBC AUTO: 91.7 FL (ref 79–97)
MCV RBC AUTO: 91.8 FL (ref 79–97)
MCV RBC AUTO: 92.9 FL (ref 79–97)
MCV RBC AUTO: 94.7 FL (ref 79–97)
MODALITY: ABNORMAL
MODALITY: ABNORMAL
MONOCYTES # BLD AUTO: 0.79 10*3/MM3 (ref 0.1–0.9)
MONOCYTES NFR BLD AUTO: 6.9 % (ref 5–12)
NEUTROPHILS NFR BLD AUTO: 84.1 % (ref 42.7–76)
NEUTROPHILS NFR BLD AUTO: 9.57 10*3/MM3 (ref 1.7–7)
NRBC BLD AUTO-RTO: 0 /100 WBC (ref 0–0.2)
O2 A-A PPRESDIFF RESPIRATORY: 0.3 MMHG
O2 A-A PPRESDIFF RESPIRATORY: 0.5 MMHG
PCO2 BLDA: 38.3 MM HG (ref 35–45)
PCO2 BLDA: 40.1 MM HG (ref 35–45)
PCO2 BLDA: 41.7 MM HG (ref 35–45)
PCO2 BLDA: 42.9 MM HG (ref 35–45)
PCO2 BLDA: 43.4 MM HG (ref 35–45)
PCO2 BLDA: 46.8 MM HG (ref 35–45)
PCO2 BLDA: 47 MM HG (ref 35–45)
PCO2 BLDA: 50.6 MM HG (ref 35–45)
PCO2 BLDA: 63 MM HG (ref 35–45)
PEEP RESPIRATORY: 8 CM[H2O]
PEEP RESPIRATORY: 8 CM[H2O]
PH BLDA: 7.29 PH UNITS (ref 7.35–7.6)
PH BLDA: 7.32 PH UNITS (ref 7.35–7.45)
PH BLDA: 7.38 PH UNITS (ref 7.35–7.45)
PH BLDA: 7.38 PH UNITS (ref 7.35–7.6)
PH BLDA: 7.4 PH UNITS (ref 7.35–7.6)
PH BLDA: 7.4 PH UNITS (ref 7.35–7.6)
PH BLDA: 7.41 PH UNITS (ref 7.35–7.6)
PH BLDA: 7.42 PH UNITS (ref 7.35–7.6)
PH BLDA: 7.43 PH UNITS (ref 7.35–7.6)
PHOSPHATE SERPL-MCNC: 1 MG/DL (ref 2.5–4.5)
PHOSPHATE SERPL-MCNC: 2.5 MG/DL (ref 2.5–4.5)
PLATELET # BLD AUTO: 120 10*3/MM3 (ref 140–450)
PLATELET # BLD AUTO: 123 10*3/MM3 (ref 140–450)
PLATELET # BLD AUTO: 135 10*3/MM3 (ref 140–450)
PLATELET # BLD AUTO: 139 10*3/MM3 (ref 140–450)
PMV BLD AUTO: 9.7 FL (ref 6–12)
PMV BLD AUTO: 9.7 FL (ref 6–12)
PMV BLD AUTO: 9.8 FL (ref 6–12)
PMV BLD AUTO: 9.8 FL (ref 6–12)
PO2 BLDA: 117.8 MM HG (ref 80–100)
PO2 BLDA: 216.8 MM HG (ref 80–100)
PO2 BLDA: 263 MMHG (ref 80–105)
PO2 BLDA: 382 MMHG (ref 80–105)
PO2 BLDA: 384 MMHG (ref 80–105)
PO2 BLDA: 43 MMHG (ref 80–105)
PO2 BLDA: 430 MMHG (ref 80–105)
PO2 BLDA: 474 MMHG (ref 80–105)
PO2 BLDA: 639 MMHG (ref 80–105)
POTASSIUM BLDA-SCNC: 3.6 MMOL/L (ref 3.5–4.9)
POTASSIUM BLDA-SCNC: 3.7 MMOL/L (ref 3.5–4.9)
POTASSIUM BLDA-SCNC: 3.9 MMOL/L (ref 3.5–4.9)
POTASSIUM BLDA-SCNC: 4 MMOL/L (ref 3.5–4.9)
POTASSIUM BLDA-SCNC: 4.2 MMOL/L (ref 3.5–4.9)
POTASSIUM SERPL-SCNC: 3.8 MMOL/L (ref 3.5–5.2)
POTASSIUM SERPL-SCNC: 4.6 MMOL/L (ref 3.5–5.2)
PROTHROMBIN TIME: 16.8 SECONDS (ref 12.8–15.2)
PROTHROMBIN TIME: 17.6 SECONDS (ref 11.7–14.2)
PROTHROMBIN TIME: 19.1 SECONDS (ref 11.7–14.2)
PSV: 8 CMH2O
QT INTERVAL: 414 MS
QTC INTERVAL: 417 MS
RBC # BLD AUTO: 2.81 10*6/MM3 (ref 4.14–5.8)
RBC # BLD AUTO: 3.39 10*6/MM3 (ref 4.14–5.8)
RBC # BLD AUTO: 3.53 10*6/MM3 (ref 4.14–5.8)
RBC # BLD AUTO: 3.54 10*6/MM3 (ref 4.14–5.8)
RH BLD: NEGATIVE
SAO2 % BLDA: 100 % (ref 95–98)
SAO2 % BLDA: 77 % (ref 95–98)
SAO2 % BLDCOA: 98.2 % (ref 92–98.5)
SAO2 % BLDCOA: 99.8 % (ref 92–98.5)
SET MECH RESP RATE: 15
SODIUM SERPL-SCNC: 142 MMOL/L (ref 136–145)
SODIUM SERPL-SCNC: 145 MMOL/L (ref 136–145)
TOTAL RATE: 10 BREATHS/MINUTE
TOTAL RATE: 15 BREATHS/MINUTE
VENTILATOR MODE: ABNORMAL
VENTILATOR MODE: ABNORMAL
VT ON VENT VENT: 580 ML
WBC NRBC COR # BLD AUTO: 11.3 10*3/MM3 (ref 3.4–10.8)
WBC NRBC COR # BLD AUTO: 11.38 10*3/MM3 (ref 3.4–10.8)
WBC NRBC COR # BLD AUTO: 11.65 10*3/MM3 (ref 3.4–10.8)
WBC NRBC COR # BLD AUTO: 8.72 10*3/MM3 (ref 3.4–10.8)

## 2024-02-28 PROCEDURE — 33866 AORTIC HEMIARCH GRAFT: CPT | Performed by: THORACIC SURGERY (CARDIOTHORACIC VASCULAR SURGERY)

## 2024-02-28 PROCEDURE — 88304 TISSUE EXAM BY PATHOLOGIST: CPT | Performed by: THORACIC SURGERY (CARDIOTHORACIC VASCULAR SURGERY)

## 2024-02-28 PROCEDURE — 36430 TRANSFUSION BLD/BLD COMPNT: CPT

## 2024-02-28 PROCEDURE — 04R00JZ REPLACEMENT OF ABDOMINAL AORTA WITH SYNTHETIC SUBSTITUTE, OPEN APPROACH: ICD-10-PCS | Performed by: THORACIC SURGERY (CARDIOTHORACIC VASCULAR SURGERY)

## 2024-02-28 PROCEDURE — 85610 PROTHROMBIN TIME: CPT | Performed by: NURSE PRACTITIONER

## 2024-02-28 PROCEDURE — 5A1221Z PERFORMANCE OF CARDIAC OUTPUT, CONTINUOUS: ICD-10-PCS | Performed by: THORACIC SURGERY (CARDIOTHORACIC VASCULAR SURGERY)

## 2024-02-28 PROCEDURE — 33859 AS-AORT GRF F/DS OTH/THN DSJ: CPT | Performed by: SPECIALIST/TECHNOLOGIST, OTHER

## 2024-02-28 PROCEDURE — P9100 PATHOGEN TEST FOR PLATELETS: HCPCS

## 2024-02-28 PROCEDURE — 25010000002 CEFAZOLIN IN DEXTROSE 2-4 GM/100ML-% SOLUTION: Performed by: NURSE PRACTITIONER

## 2024-02-28 PROCEDURE — 94799 UNLISTED PULMONARY SVC/PX: CPT

## 2024-02-28 PROCEDURE — 25010000002 ACETAMINOPHEN 10 MG/ML SOLUTION: Performed by: NURSE PRACTITIONER

## 2024-02-28 PROCEDURE — 86900 BLOOD TYPING SEROLOGIC ABO: CPT

## 2024-02-28 PROCEDURE — 25010000002 MIDAZOLAM PER 1 MG: Performed by: ANESTHESIOLOGY

## 2024-02-28 PROCEDURE — 63710000001 INSULIN REGULAR HUMAN PER 5 UNITS: Performed by: ANESTHESIOLOGY

## 2024-02-28 PROCEDURE — 82330 ASSAY OF CALCIUM: CPT | Performed by: NURSE PRACTITIONER

## 2024-02-28 PROCEDURE — 71045 X-RAY EXAM CHEST 1 VIEW: CPT

## 2024-02-28 PROCEDURE — 85384 FIBRINOGEN ACTIVITY: CPT | Performed by: NURSE PRACTITIONER

## 2024-02-28 PROCEDURE — 85018 HEMOGLOBIN: CPT

## 2024-02-28 PROCEDURE — 25010000002 PHENYLEPHRINE 10 MG/ML SOLUTION 5 ML VIAL: Performed by: ANESTHESIOLOGY

## 2024-02-28 PROCEDURE — 86901 BLOOD TYPING SEROLOGIC RH(D): CPT

## 2024-02-28 PROCEDURE — 02RX0JZ REPLACEMENT OF THORACIC AORTA, ASCENDING/ARCH WITH SYNTHETIC SUBSTITUTE, OPEN APPROACH: ICD-10-PCS | Performed by: THORACIC SURGERY (CARDIOTHORACIC VASCULAR SURGERY)

## 2024-02-28 PROCEDURE — C1751 CATH, INF, PER/CENT/MIDLINE: HCPCS | Performed by: ANESTHESIOLOGY

## 2024-02-28 PROCEDURE — C1713 ANCHOR/SCREW BN/BN,TIS/BN: HCPCS | Performed by: THORACIC SURGERY (CARDIOTHORACIC VASCULAR SURGERY)

## 2024-02-28 PROCEDURE — 33866 AORTIC HEMIARCH GRAFT: CPT | Performed by: SPECIALIST/TECHNOLOGIST, OTHER

## 2024-02-28 PROCEDURE — 85027 COMPLETE CBC AUTOMATED: CPT | Performed by: NURSE PRACTITIONER

## 2024-02-28 PROCEDURE — 94761 N-INVAS EAR/PLS OXIMETRY MLT: CPT

## 2024-02-28 PROCEDURE — 25010000002 HEPARIN (PORCINE) PER 1000 UNITS

## 2024-02-28 PROCEDURE — 33268 EXCL LAA OPN OTH PX ANY METH: CPT | Performed by: THORACIC SURGERY (CARDIOTHORACIC VASCULAR SURGERY)

## 2024-02-28 PROCEDURE — 93005 ELECTROCARDIOGRAM TRACING: CPT | Performed by: NURSE PRACTITIONER

## 2024-02-28 PROCEDURE — 85384 FIBRINOGEN ACTIVITY: CPT | Performed by: THORACIC SURGERY (CARDIOTHORACIC VASCULAR SURGERY)

## 2024-02-28 PROCEDURE — 86927 PLASMA FRESH FROZEN: CPT

## 2024-02-28 PROCEDURE — 82803 BLOOD GASES ANY COMBINATION: CPT

## 2024-02-28 PROCEDURE — 82948 REAGENT STRIP/BLOOD GLUCOSE: CPT

## 2024-02-28 PROCEDURE — C1768 GRAFT, VASCULAR: HCPCS | Performed by: THORACIC SURGERY (CARDIOTHORACIC VASCULAR SURGERY)

## 2024-02-28 PROCEDURE — 93318 ECHO TRANSESOPHAGEAL INTRAOP: CPT | Performed by: ANESTHESIOLOGY

## 2024-02-28 PROCEDURE — 85027 COMPLETE CBC AUTOMATED: CPT | Performed by: THORACIC SURGERY (CARDIOTHORACIC VASCULAR SURGERY)

## 2024-02-28 PROCEDURE — 85730 THROMBOPLASTIN TIME PARTIAL: CPT | Performed by: NURSE PRACTITIONER

## 2024-02-28 PROCEDURE — 82803 BLOOD GASES ANY COMBINATION: CPT | Performed by: NURSE PRACTITIONER

## 2024-02-28 PROCEDURE — 25810000003 SODIUM CHLORIDE 0.9 % SOLUTION: Performed by: THORACIC SURGERY (CARDIOTHORACIC VASCULAR SURGERY)

## 2024-02-28 PROCEDURE — 25010000002 MORPHINE PER 10 MG: Performed by: NURSE PRACTITIONER

## 2024-02-28 PROCEDURE — 25010000002 NICARDIPINE 2.5 MG/ML SOLUTION: Performed by: ANESTHESIOLOGY

## 2024-02-28 PROCEDURE — 25010000002 HEPARIN (PORCINE) PER 1000 UNITS: Performed by: THORACIC SURGERY (CARDIOTHORACIC VASCULAR SURGERY)

## 2024-02-28 PROCEDURE — P9035 PLATELET PHERES LEUKOREDUCED: HCPCS

## 2024-02-28 PROCEDURE — 85610 PROTHROMBIN TIME: CPT

## 2024-02-28 PROCEDURE — 25810000003 SODIUM CHLORIDE 0.9 % SOLUTION

## 2024-02-28 PROCEDURE — 25010000002 HEPARIN (PORCINE) PER 1000 UNITS: Performed by: ANESTHESIOLOGY

## 2024-02-28 PROCEDURE — 94760 N-INVAS EAR/PLS OXIMETRY 1: CPT

## 2024-02-28 PROCEDURE — C1729 CATH, DRAINAGE: HCPCS | Performed by: THORACIC SURGERY (CARDIOTHORACIC VASCULAR SURGERY)

## 2024-02-28 PROCEDURE — 80069 RENAL FUNCTION PANEL: CPT | Performed by: NURSE PRACTITIONER

## 2024-02-28 PROCEDURE — 33859 AS-AORT GRF F/DS OTH/THN DSJ: CPT | Performed by: THORACIC SURGERY (CARDIOTHORACIC VASCULAR SURGERY)

## 2024-02-28 PROCEDURE — 85347 COAGULATION TIME ACTIVATED: CPT

## 2024-02-28 PROCEDURE — 25010000002 PROTAMINE SULFATE PER 10 MG: Performed by: ANESTHESIOLOGY

## 2024-02-28 PROCEDURE — 25010000002 PROPOFOL 10 MG/ML EMULSION: Performed by: ANESTHESIOLOGY

## 2024-02-28 PROCEDURE — P9041 ALBUMIN (HUMAN),5%, 50ML: HCPCS | Performed by: NURSE PRACTITIONER

## 2024-02-28 PROCEDURE — 25010000002 CEFAZOLIN IN DEXTROSE 2000 MG/ 100 ML SOLUTION: Performed by: NURSE PRACTITIONER

## 2024-02-28 PROCEDURE — 85730 THROMBOPLASTIN TIME PARTIAL: CPT | Performed by: THORACIC SURGERY (CARDIOTHORACIC VASCULAR SURGERY)

## 2024-02-28 PROCEDURE — 25010000002 FOSPHENYTOIN 500 MG PE/10ML SOLUTION 10 ML VIAL: Performed by: ANESTHESIOLOGY

## 2024-02-28 PROCEDURE — 25010000002 ALBUMIN HUMAN 25% PER 50 ML

## 2024-02-28 PROCEDURE — B24BZZ4 ULTRASONOGRAPHY OF HEART WITH AORTA, TRANSESOPHAGEAL: ICD-10-PCS | Performed by: THORACIC SURGERY (CARDIOTHORACIC VASCULAR SURGERY)

## 2024-02-28 PROCEDURE — 85025 COMPLETE CBC W/AUTO DIFF WBC: CPT | Performed by: NURSE PRACTITIONER

## 2024-02-28 PROCEDURE — 83735 ASSAY OF MAGNESIUM: CPT | Performed by: NURSE PRACTITIONER

## 2024-02-28 PROCEDURE — 4A11X4G MONITORING OF PERIPHERAL NERVOUS ELECTRICAL ACTIVITY, INTRAOPERATIVE, EXTERNAL APPROACH: ICD-10-PCS | Performed by: THORACIC SURGERY (CARDIOTHORACIC VASCULAR SURGERY)

## 2024-02-28 PROCEDURE — 85610 PROTHROMBIN TIME: CPT | Performed by: THORACIC SURGERY (CARDIOTHORACIC VASCULAR SURGERY)

## 2024-02-28 PROCEDURE — 02570ZK DESTRUCTION OF LEFT ATRIAL APPENDAGE, OPEN APPROACH: ICD-10-PCS | Performed by: THORACIC SURGERY (CARDIOTHORACIC VASCULAR SURGERY)

## 2024-02-28 PROCEDURE — 25010000002 MAGNESIUM SULFATE 2 GM/50ML SOLUTION: Performed by: NURSE PRACTITIONER

## 2024-02-28 PROCEDURE — 25010000002 NICARDIPINE 2.5 MG/ML SOLUTION 10 ML VIAL: Performed by: ANESTHESIOLOGY

## 2024-02-28 PROCEDURE — 25810000003 SODIUM CHLORIDE 0.9 % SOLUTION 250 ML FLEX CONT: Performed by: ANESTHESIOLOGY

## 2024-02-28 PROCEDURE — 82947 ASSAY GLUCOSE BLOOD QUANT: CPT

## 2024-02-28 PROCEDURE — P9047 ALBUMIN (HUMAN), 25%, 50ML: HCPCS

## 2024-02-28 PROCEDURE — 85014 HEMATOCRIT: CPT

## 2024-02-28 PROCEDURE — 93010 ELECTROCARDIOGRAM REPORT: CPT | Performed by: INTERNAL MEDICINE

## 2024-02-28 PROCEDURE — 25010000002 METHYLPREDNISOLONE PER 125 MG: Performed by: ANESTHESIOLOGY

## 2024-02-28 PROCEDURE — 94002 VENT MGMT INPAT INIT DAY: CPT

## 2024-02-28 PROCEDURE — 25010000002 VANCOMYCIN 1 G RECONSTITUTED SOLUTION

## 2024-02-28 PROCEDURE — 25010000002 METHADONE PER 10 MG: Performed by: ANESTHESIOLOGY

## 2024-02-28 PROCEDURE — C1889 IMPLANT/INSERT DEVICE, NOC: HCPCS | Performed by: THORACIC SURGERY (CARDIOTHORACIC VASCULAR SURGERY)

## 2024-02-28 PROCEDURE — 25010000002 ALBUMIN HUMAN 5% PER 50 ML: Performed by: NURSE PRACTITIONER

## 2024-02-28 PROCEDURE — P9012 CRYOPRECIPITATE EACH UNIT: HCPCS

## 2024-02-28 PROCEDURE — 33268 EXCL LAA OPN OTH PX ANY METH: CPT | Performed by: SPECIALIST/TECHNOLOGIST, OTHER

## 2024-02-28 DEVICE — APPL CLIP LAA ATRICLIP FLXV 40MM: Type: IMPLANTABLE DEVICE | Site: HEART | Status: FUNCTIONAL

## 2024-02-28 DEVICE — TEMP PACING WIRE
Type: IMPLANTABLE DEVICE | Site: HEART | Status: FUNCTIONAL
Brand: MYO/WIRE

## 2024-02-28 DEVICE — GELWEAVE GELATIN IMPREGNATED WOVEN VASCULAR PROSTHESIS STRAIGHT
Type: IMPLANTABLE DEVICE | Site: HEART | Status: FUNCTIONAL
Brand: GELWEAVE™

## 2024-02-28 DEVICE — SS SUTURE, 3 PER SLEEVE
Type: IMPLANTABLE DEVICE | Site: CHEST WALL | Status: FUNCTIONAL
Brand: MYO/WIRE II

## 2024-02-28 DEVICE — SS SUTURE, 4 PER SLEEVE
Type: IMPLANTABLE DEVICE | Site: CHEST WALL | Status: FUNCTIONAL
Brand: MYO/WIRE II

## 2024-02-28 RX ORDER — FENTANYL/ROPIVACAINE/NS/PF 2-625MCG/1
15 PLASTIC BAG, INJECTION (ML) EPIDURAL ONCE
Status: COMPLETED | OUTPATIENT
Start: 2024-02-28 | End: 2024-02-28

## 2024-02-28 RX ORDER — IBUPROFEN 600 MG/1
1 TABLET ORAL
Status: DISCONTINUED | OUTPATIENT
Start: 2024-02-28 | End: 2024-02-29

## 2024-02-28 RX ORDER — CEFAZOLIN SODIUM 2 G/100ML
2000 INJECTION, SOLUTION INTRAVENOUS ONCE
Status: COMPLETED | OUTPATIENT
Start: 2024-02-28 | End: 2024-02-28

## 2024-02-28 RX ORDER — PHENYLEPHRINE HCL IN 0.9% NACL 0.5 MG/5ML
.2-2 SYRINGE (ML) INTRAVENOUS CONTINUOUS PRN
Status: DISCONTINUED | OUTPATIENT
Start: 2024-02-28 | End: 2024-03-05 | Stop reason: HOSPADM

## 2024-02-28 RX ORDER — CYCLOBENZAPRINE HCL 10 MG
10 TABLET ORAL EVERY 8 HOURS PRN
Status: DISCONTINUED | OUTPATIENT
Start: 2024-02-29 | End: 2024-03-05 | Stop reason: HOSPADM

## 2024-02-28 RX ORDER — SODIUM CHLORIDE 0.9 % (FLUSH) 0.9 %
3 SYRINGE (ML) INJECTION EVERY 12 HOURS SCHEDULED
Status: DISCONTINUED | OUTPATIENT
Start: 2024-02-28 | End: 2024-02-28 | Stop reason: HOSPADM

## 2024-02-28 RX ORDER — ACETAMINOPHEN 160 MG/5ML
650 SOLUTION ORAL EVERY 4 HOURS PRN
Status: DISCONTINUED | OUTPATIENT
Start: 2024-02-29 | End: 2024-03-05 | Stop reason: HOSPADM

## 2024-02-28 RX ORDER — ACETAMINOPHEN 650 MG/1
650 SUPPOSITORY RECTAL EVERY 4 HOURS
Status: ACTIVE | OUTPATIENT
Start: 2024-02-28 | End: 2024-02-29

## 2024-02-28 RX ORDER — MEPERIDINE HYDROCHLORIDE 25 MG/ML
25 INJECTION INTRAMUSCULAR; INTRAVENOUS; SUBCUTANEOUS EVERY 4 HOURS PRN
Status: ACTIVE | OUTPATIENT
Start: 2024-02-28 | End: 2024-02-28

## 2024-02-28 RX ORDER — BISACODYL 10 MG
10 SUPPOSITORY, RECTAL RECTAL DAILY PRN
Status: DISCONTINUED | OUTPATIENT
Start: 2024-02-29 | End: 2024-03-05 | Stop reason: HOSPADM

## 2024-02-28 RX ORDER — CHLORHEXIDINE GLUCONATE 500 MG/1
CLOTH TOPICAL EVERY 12 HOURS PRN
Status: DISCONTINUED | OUTPATIENT
Start: 2024-02-28 | End: 2024-02-28 | Stop reason: HOSPADM

## 2024-02-28 RX ORDER — SODIUM CHLORIDE 9 MG/ML
INJECTION, SOLUTION INTRAVENOUS CONTINUOUS PRN
Status: DISCONTINUED | OUTPATIENT
Start: 2024-02-28 | End: 2024-02-28 | Stop reason: SURG

## 2024-02-28 RX ORDER — MIDAZOLAM HYDROCHLORIDE 1 MG/ML
0.5 INJECTION INTRAMUSCULAR; INTRAVENOUS
Status: DISCONTINUED | OUTPATIENT
Start: 2024-02-28 | End: 2024-02-28 | Stop reason: HOSPADM

## 2024-02-28 RX ORDER — SODIUM CHLORIDE 9 MG/ML
30 INJECTION, SOLUTION INTRAVENOUS CONTINUOUS
Status: DISCONTINUED | OUTPATIENT
Start: 2024-02-28 | End: 2024-03-05 | Stop reason: HOSPADM

## 2024-02-28 RX ORDER — CEFAZOLIN SODIUM 2 G/100ML
2000 INJECTION, SOLUTION INTRAVENOUS EVERY 8 HOURS
Qty: 500 ML | Refills: 0 | Status: COMPLETED | OUTPATIENT
Start: 2024-02-28 | End: 2024-03-01

## 2024-02-28 RX ORDER — CHLORHEXIDINE GLUCONATE ORAL RINSE 1.2 MG/ML
15 SOLUTION DENTAL ONCE
Status: COMPLETED | OUTPATIENT
Start: 2024-02-28 | End: 2024-02-28

## 2024-02-28 RX ORDER — OXYCODONE HYDROCHLORIDE 5 MG/1
10 TABLET ORAL EVERY 4 HOURS PRN
Status: DISCONTINUED | OUTPATIENT
Start: 2024-02-28 | End: 2024-03-05 | Stop reason: HOSPADM

## 2024-02-28 RX ORDER — LIDOCAINE HYDROCHLORIDE 20 MG/ML
INJECTION, SOLUTION INFILTRATION; PERINEURAL AS NEEDED
Status: DISCONTINUED | OUTPATIENT
Start: 2024-02-28 | End: 2024-02-28 | Stop reason: SURG

## 2024-02-28 RX ORDER — METHYLPREDNISOLONE SODIUM SUCCINATE 125 MG/2ML
INJECTION, POWDER, LYOPHILIZED, FOR SOLUTION INTRAMUSCULAR; INTRAVENOUS AS NEEDED
Status: DISCONTINUED | OUTPATIENT
Start: 2024-02-28 | End: 2024-02-28 | Stop reason: SURG

## 2024-02-28 RX ORDER — CHLORHEXIDINE GLUCONATE ORAL RINSE 1.2 MG/ML
15 SOLUTION DENTAL EVERY 12 HOURS
Status: DISCONTINUED | OUTPATIENT
Start: 2024-02-28 | End: 2024-03-05 | Stop reason: HOSPADM

## 2024-02-28 RX ORDER — ALPRAZOLAM 0.25 MG/1
0.25 TABLET ORAL EVERY 8 HOURS PRN
Status: DISCONTINUED | OUTPATIENT
Start: 2024-02-28 | End: 2024-03-05 | Stop reason: HOSPADM

## 2024-02-28 RX ORDER — NICOTINE POLACRILEX 4 MG
15 LOZENGE BUCCAL
Status: DISCONTINUED | OUTPATIENT
Start: 2024-02-28 | End: 2024-02-29

## 2024-02-28 RX ORDER — ONDANSETRON 2 MG/ML
4 INJECTION INTRAMUSCULAR; INTRAVENOUS EVERY 6 HOURS PRN
Status: DISCONTINUED | OUTPATIENT
Start: 2024-02-28 | End: 2024-03-05 | Stop reason: HOSPADM

## 2024-02-28 RX ORDER — NITROGLYCERIN 20 MG/100ML
5-200 INJECTION INTRAVENOUS
Status: DISCONTINUED | OUTPATIENT
Start: 2024-02-28 | End: 2024-02-29

## 2024-02-28 RX ORDER — ALBUMIN, HUMAN INJ 5% 5 %
1000 SOLUTION INTRAVENOUS AS NEEDED
Status: DISPENSED | OUTPATIENT
Start: 2024-02-28 | End: 2024-02-29

## 2024-02-28 RX ORDER — AMOXICILLIN 250 MG
2 CAPSULE ORAL NIGHTLY
Status: DISCONTINUED | OUTPATIENT
Start: 2024-02-29 | End: 2024-03-05 | Stop reason: HOSPADM

## 2024-02-28 RX ORDER — PROPOFOL 10 MG/ML
VIAL (ML) INTRAVENOUS AS NEEDED
Status: DISCONTINUED | OUTPATIENT
Start: 2024-02-28 | End: 2024-02-28 | Stop reason: SURG

## 2024-02-28 RX ORDER — ACETAMINOPHEN 500 MG
1000 TABLET ORAL ONCE
Status: COMPLETED | OUTPATIENT
Start: 2024-02-28 | End: 2024-02-28

## 2024-02-28 RX ORDER — MORPHINE SULFATE 2 MG/ML
4 INJECTION, SOLUTION INTRAMUSCULAR; INTRAVENOUS
Status: DISCONTINUED | OUTPATIENT
Start: 2024-02-28 | End: 2024-03-05 | Stop reason: HOSPADM

## 2024-02-28 RX ORDER — HEPARIN SODIUM 1000 [USP'U]/ML
INJECTION, SOLUTION INTRAVENOUS; SUBCUTANEOUS AS NEEDED
Status: DISCONTINUED | OUTPATIENT
Start: 2024-02-28 | End: 2024-02-28 | Stop reason: SURG

## 2024-02-28 RX ORDER — DOPAMINE HYDROCHLORIDE 160 MG/100ML
2-20 INJECTION, SOLUTION INTRAVENOUS CONTINUOUS PRN
Status: DISCONTINUED | OUTPATIENT
Start: 2024-02-28 | End: 2024-03-04

## 2024-02-28 RX ORDER — HYDROCODONE BITARTRATE AND ACETAMINOPHEN 5; 325 MG/1; MG/1
2 TABLET ORAL EVERY 4 HOURS PRN
Status: DISCONTINUED | OUTPATIENT
Start: 2024-02-28 | End: 2024-03-05 | Stop reason: HOSPADM

## 2024-02-28 RX ORDER — ROCURONIUM BROMIDE 10 MG/ML
INJECTION, SOLUTION INTRAVENOUS AS NEEDED
Status: DISCONTINUED | OUTPATIENT
Start: 2024-02-28 | End: 2024-02-28 | Stop reason: SURG

## 2024-02-28 RX ORDER — ACETAMINOPHEN 650 MG/1
650 SUPPOSITORY RECTAL EVERY 4 HOURS PRN
Status: DISCONTINUED | OUTPATIENT
Start: 2024-02-29 | End: 2024-03-05 | Stop reason: HOSPADM

## 2024-02-28 RX ORDER — METHADONE HYDROCHLORIDE 10 MG/ML
INJECTION, SOLUTION INTRAMUSCULAR; INTRAVENOUS; SUBCUTANEOUS AS NEEDED
Status: DISCONTINUED | OUTPATIENT
Start: 2024-02-28 | End: 2024-02-28 | Stop reason: SURG

## 2024-02-28 RX ORDER — PANTOPRAZOLE SODIUM 40 MG/1
40 TABLET, DELAYED RELEASE ORAL EVERY MORNING
Status: DISCONTINUED | OUTPATIENT
Start: 2024-02-29 | End: 2024-03-05 | Stop reason: HOSPADM

## 2024-02-28 RX ORDER — AMINOCAPROIC ACID 250 MG/ML
INJECTION, SOLUTION INTRAVENOUS AS NEEDED
Status: DISCONTINUED | OUTPATIENT
Start: 2024-02-28 | End: 2024-02-28 | Stop reason: SURG

## 2024-02-28 RX ORDER — SODIUM CHLORIDE, SODIUM LACTATE, POTASSIUM CHLORIDE, CALCIUM CHLORIDE 600; 310; 30; 20 MG/100ML; MG/100ML; MG/100ML; MG/100ML
9 INJECTION, SOLUTION INTRAVENOUS CONTINUOUS
Status: DISCONTINUED | OUTPATIENT
Start: 2024-02-28 | End: 2024-02-28

## 2024-02-28 RX ORDER — MORPHINE SULFATE 2 MG/ML
1 INJECTION, SOLUTION INTRAMUSCULAR; INTRAVENOUS EVERY 4 HOURS PRN
Status: DISCONTINUED | OUTPATIENT
Start: 2024-02-28 | End: 2024-03-05 | Stop reason: HOSPADM

## 2024-02-28 RX ORDER — ACETAMINOPHEN 325 MG/1
650 TABLET ORAL EVERY 4 HOURS
Status: ACTIVE | OUTPATIENT
Start: 2024-02-28 | End: 2024-02-29

## 2024-02-28 RX ORDER — HEPARIN SODIUM 5000 [USP'U]/ML
INJECTION, SOLUTION INTRAVENOUS; SUBCUTANEOUS AS NEEDED
Status: DISCONTINUED | OUTPATIENT
Start: 2024-02-28 | End: 2024-02-28 | Stop reason: HOSPADM

## 2024-02-28 RX ORDER — MIDAZOLAM HYDROCHLORIDE 1 MG/ML
2 INJECTION INTRAMUSCULAR; INTRAVENOUS
Status: DISCONTINUED | OUTPATIENT
Start: 2024-02-28 | End: 2024-03-05 | Stop reason: HOSPADM

## 2024-02-28 RX ORDER — PANTOPRAZOLE SODIUM 40 MG/10ML
40 INJECTION, POWDER, LYOPHILIZED, FOR SOLUTION INTRAVENOUS ONCE
Status: DISCONTINUED | OUTPATIENT
Start: 2024-02-28 | End: 2024-03-05 | Stop reason: HOSPADM

## 2024-02-28 RX ORDER — NALOXONE HCL 0.4 MG/ML
0.4 VIAL (ML) INJECTION
Status: DISCONTINUED | OUTPATIENT
Start: 2024-02-28 | End: 2024-03-05 | Stop reason: HOSPADM

## 2024-02-28 RX ORDER — ATORVASTATIN CALCIUM 20 MG/1
40 TABLET, FILM COATED ORAL NIGHTLY
Status: DISCONTINUED | OUTPATIENT
Start: 2024-02-28 | End: 2024-03-05 | Stop reason: HOSPADM

## 2024-02-28 RX ORDER — MAGNESIUM SULFATE HEPTAHYDRATE 40 MG/ML
2 INJECTION, SOLUTION INTRAVENOUS EVERY 8 HOURS
Status: DISPENSED | OUTPATIENT
Start: 2024-02-28 | End: 2024-02-29

## 2024-02-28 RX ORDER — METOCLOPRAMIDE HYDROCHLORIDE 5 MG/ML
10 INJECTION INTRAMUSCULAR; INTRAVENOUS EVERY 6 HOURS
Status: ACTIVE | OUTPATIENT
Start: 2024-02-28 | End: 2024-02-29

## 2024-02-28 RX ORDER — PROTAMINE SULFATE 10 MG/ML
INJECTION, SOLUTION INTRAVENOUS AS NEEDED
Status: DISCONTINUED | OUTPATIENT
Start: 2024-02-28 | End: 2024-02-28 | Stop reason: SURG

## 2024-02-28 RX ORDER — NOREPINEPHRINE BITARTRATE 1 MG/ML
INJECTION, SOLUTION INTRAVENOUS CONTINUOUS PRN
Status: DISCONTINUED | OUTPATIENT
Start: 2024-02-28 | End: 2024-02-28 | Stop reason: SURG

## 2024-02-28 RX ORDER — NITROGLYCERIN 0.4 MG/1
0.4 TABLET SUBLINGUAL
Status: DISCONTINUED | OUTPATIENT
Start: 2024-02-28 | End: 2024-03-05 | Stop reason: HOSPADM

## 2024-02-28 RX ORDER — ACETAMINOPHEN 160 MG/5ML
650 SOLUTION ORAL EVERY 4 HOURS
Status: ACTIVE | OUTPATIENT
Start: 2024-02-28 | End: 2024-02-29

## 2024-02-28 RX ORDER — ENOXAPARIN SODIUM 100 MG/ML
40 INJECTION SUBCUTANEOUS DAILY
Status: DISCONTINUED | OUTPATIENT
Start: 2024-02-29 | End: 2024-03-05 | Stop reason: HOSPADM

## 2024-02-28 RX ORDER — FENTANYL CITRATE 50 UG/ML
50 INJECTION, SOLUTION INTRAMUSCULAR; INTRAVENOUS ONCE AS NEEDED
Status: DISCONTINUED | OUTPATIENT
Start: 2024-02-28 | End: 2024-02-28 | Stop reason: HOSPADM

## 2024-02-28 RX ORDER — DEXTROSE MONOHYDRATE 25 G/50ML
10-50 INJECTION, SOLUTION INTRAVENOUS
Status: DISCONTINUED | OUTPATIENT
Start: 2024-02-28 | End: 2024-02-29

## 2024-02-28 RX ORDER — BISACODYL 5 MG/1
10 TABLET, DELAYED RELEASE ORAL DAILY PRN
Status: DISCONTINUED | OUTPATIENT
Start: 2024-02-28 | End: 2024-03-05 | Stop reason: HOSPADM

## 2024-02-28 RX ORDER — MILRINONE LACTATE 0.2 MG/ML
.25-.375 INJECTION, SOLUTION INTRAVENOUS CONTINUOUS PRN
Status: DISCONTINUED | OUTPATIENT
Start: 2024-02-28 | End: 2024-03-04

## 2024-02-28 RX ORDER — POLYETHYLENE GLYCOL 3350 17 G/17G
17 POWDER, FOR SOLUTION ORAL DAILY PRN
Status: DISCONTINUED | OUTPATIENT
Start: 2024-02-28 | End: 2024-03-05 | Stop reason: HOSPADM

## 2024-02-28 RX ORDER — NICARDIPINE HYDROCHLORIDE 2.5 MG/ML
INJECTION INTRAVENOUS AS NEEDED
Status: DISCONTINUED | OUTPATIENT
Start: 2024-02-28 | End: 2024-02-28 | Stop reason: SURG

## 2024-02-28 RX ORDER — NOREPINEPHRINE BITARTRATE 0.03 MG/ML
.02-.2 INJECTION, SOLUTION INTRAVENOUS CONTINUOUS PRN
Status: DISCONTINUED | OUTPATIENT
Start: 2024-02-28 | End: 2024-03-04

## 2024-02-28 RX ORDER — ACETAMINOPHEN 10 MG/ML
1000 INJECTION, SOLUTION INTRAVENOUS EVERY 8 HOURS
Status: COMPLETED | OUTPATIENT
Start: 2024-02-28 | End: 2024-02-29

## 2024-02-28 RX ORDER — LIDOCAINE HYDROCHLORIDE 10 MG/ML
0.5 INJECTION, SOLUTION INFILTRATION; PERINEURAL ONCE AS NEEDED
Status: DISCONTINUED | OUTPATIENT
Start: 2024-02-28 | End: 2024-02-28 | Stop reason: HOSPADM

## 2024-02-28 RX ORDER — ASPIRIN 81 MG/1
81 TABLET ORAL DAILY
Status: DISCONTINUED | OUTPATIENT
Start: 2024-02-29 | End: 2024-03-05 | Stop reason: HOSPADM

## 2024-02-28 RX ORDER — MIDAZOLAM HYDROCHLORIDE 1 MG/ML
INJECTION INTRAMUSCULAR; INTRAVENOUS AS NEEDED
Status: DISCONTINUED | OUTPATIENT
Start: 2024-02-28 | End: 2024-02-28 | Stop reason: SURG

## 2024-02-28 RX ORDER — SODIUM CHLORIDE 0.9 % (FLUSH) 0.9 %
3-10 SYRINGE (ML) INJECTION AS NEEDED
Status: DISCONTINUED | OUTPATIENT
Start: 2024-02-28 | End: 2024-02-28 | Stop reason: HOSPADM

## 2024-02-28 RX ORDER — ACETAMINOPHEN 325 MG/1
650 TABLET ORAL EVERY 4 HOURS PRN
Status: DISCONTINUED | OUTPATIENT
Start: 2024-02-29 | End: 2024-03-05 | Stop reason: HOSPADM

## 2024-02-28 RX ORDER — DEXMEDETOMIDINE HYDROCHLORIDE 4 UG/ML
.2-1.5 INJECTION, SOLUTION INTRAVENOUS
Status: DISCONTINUED | OUTPATIENT
Start: 2024-02-28 | End: 2024-02-29

## 2024-02-28 RX ADMIN — OXYCODONE 10 MG: 5 TABLET ORAL at 18:59

## 2024-02-28 RX ADMIN — ALBUMIN (HUMAN) 500 ML: 12.5 INJECTION, SOLUTION INTRAVENOUS at 13:55

## 2024-02-28 RX ADMIN — HEPARIN SODIUM 28000 UNITS: 1000 INJECTION, SOLUTION INTRAVENOUS; SUBCUTANEOUS at 08:23

## 2024-02-28 RX ADMIN — CEFAZOLIN SODIUM 2000 MG: 2 INJECTION, SOLUTION INTRAVENOUS at 11:10

## 2024-02-28 RX ADMIN — DEXMEDETOMIDINE HYDROCHLORIDE IN SODIUM CHLORIDE 0.2 MCG/KG/HR: 4 INJECTION INTRAVENOUS at 13:41

## 2024-02-28 RX ADMIN — MORPHINE SULFATE 2 MG: 2 INJECTION, SOLUTION INTRAMUSCULAR; INTRAVENOUS at 15:11

## 2024-02-28 RX ADMIN — NICARDIPINE HYDROCHLORIDE 0.5 MG: 25 INJECTION, SOLUTION INTRAVENOUS at 07:43

## 2024-02-28 RX ADMIN — SODIUM CHLORIDE: 9 INJECTION, SOLUTION INTRAVENOUS at 07:44

## 2024-02-28 RX ADMIN — AMINOCAPROIC ACID 10 G: 250 INJECTION, SOLUTION INTRAVENOUS at 07:59

## 2024-02-28 RX ADMIN — ACETAMINOPHEN 1000 MG: 10 INJECTION, SOLUTION INTRAVENOUS at 19:53

## 2024-02-28 RX ADMIN — MAGNESIUM SULFATE HEPTAHYDRATE 2 G: 40 INJECTION, SOLUTION INTRAVENOUS at 19:53

## 2024-02-28 RX ADMIN — METHYLPREDNISOLONE SODIUM SUCCINATE 250 MG: 125 INJECTION, POWDER, FOR SOLUTION INTRAMUSCULAR; INTRAVENOUS at 08:28

## 2024-02-28 RX ADMIN — MORPHINE SULFATE 4 MG: 2 INJECTION, SOLUTION INTRAMUSCULAR; INTRAVENOUS at 12:55

## 2024-02-28 RX ADMIN — 0.12% CHLORHEXIDINE GLUCONATE 15 ML: 1.2 RINSE ORAL at 05:56

## 2024-02-28 RX ADMIN — NOREPINEPHRINE BITARTRATE 0.02 MCG/KG/MIN: 1 SOLUTION INTRAVENOUS at 08:38

## 2024-02-28 RX ADMIN — PHENYLEPHRINE HYDROCHLORIDE 0.3 MCG/KG/MIN: 10 INJECTION, SOLUTION INTRAVENOUS at 07:13

## 2024-02-28 RX ADMIN — ATORVASTATIN CALCIUM 40 MG: 20 TABLET, FILM COATED ORAL at 20:35

## 2024-02-28 RX ADMIN — SODIUM CHLORIDE 1 MCG/KG/HR: 9 INJECTION, SOLUTION INTRAVENOUS at 07:43

## 2024-02-28 RX ADMIN — NICARDIPINE HYDROCHLORIDE 0.5 MG: 25 INJECTION, SOLUTION INTRAVENOUS at 07:46

## 2024-02-28 RX ADMIN — ACETAMINOPHEN 1000 MG: 500 TABLET ORAL at 05:55

## 2024-02-28 RX ADMIN — MUPIROCIN 1 APPLICATION: 20 OINTMENT TOPICAL at 20:35

## 2024-02-28 RX ADMIN — SODIUM CHLORIDE: 9 INJECTION, SOLUTION INTRAVENOUS at 06:54

## 2024-02-28 RX ADMIN — PROPOFOL 150 MG: 10 INJECTION, EMULSION INTRAVENOUS at 07:12

## 2024-02-28 RX ADMIN — ROCURONIUM BROMIDE 60 MG: 10 INJECTION, SOLUTION INTRAVENOUS at 07:13

## 2024-02-28 RX ADMIN — OXYCODONE 10 MG: 5 TABLET ORAL at 15:11

## 2024-02-28 RX ADMIN — PROTAMINE SULFATE 350 MG: 10 INJECTION, SOLUTION INTRAVENOUS at 10:16

## 2024-02-28 RX ADMIN — CEFAZOLIN SODIUM 2000 MG: 2 INJECTION, SOLUTION INTRAVENOUS at 18:35

## 2024-02-28 RX ADMIN — PROPOFOL 50 MCG/KG/MIN: 10 INJECTION, EMULSION INTRAVENOUS at 07:43

## 2024-02-28 RX ADMIN — POTASSIUM PHOSPHATE, MONOBASIC POTASSIUM PHOSPHATE, DIBASIC 15 MMOL: 224; 236 INJECTION, SOLUTION, CONCENTRATE INTRAVENOUS at 15:26

## 2024-02-28 RX ADMIN — METOPROLOL TARTRATE 12.5 MG: 25 TABLET, FILM COATED ORAL at 05:55

## 2024-02-28 RX ADMIN — PROPOFOL 100 MG: 10 INJECTION, EMULSION INTRAVENOUS at 09:07

## 2024-02-28 RX ADMIN — CEFAZOLIN SODIUM 2000 MG: 2 INJECTION, SOLUTION INTRAVENOUS at 07:20

## 2024-02-28 RX ADMIN — SODIUM CHLORIDE 0.7 MCG/KG/HR: 9 INJECTION, SOLUTION INTRAVENOUS at 08:37

## 2024-02-28 RX ADMIN — MIDAZOLAM 1 MG: 1 INJECTION INTRAMUSCULAR; INTRAVENOUS at 07:00

## 2024-02-28 RX ADMIN — ROCURONIUM BROMIDE 20 MG: 10 INJECTION, SOLUTION INTRAVENOUS at 09:34

## 2024-02-28 RX ADMIN — SODIUM CHLORIDE 30 ML/HR: 9 INJECTION, SOLUTION INTRAVENOUS at 11:56

## 2024-02-28 RX ADMIN — ACETAMINOPHEN 1000 MG: 10 INJECTION, SOLUTION INTRAVENOUS at 11:59

## 2024-02-28 RX ADMIN — NICARDIPINE HYDROCHLORIDE 3 MG/HR: 25 INJECTION, SOLUTION INTRAVENOUS at 07:44

## 2024-02-28 RX ADMIN — LIDOCAINE HYDROCHLORIDE 100 MG: 20 INJECTION, SOLUTION INFILTRATION; PERINEURAL at 07:11

## 2024-02-28 RX ADMIN — SODIUM CHLORIDE 1.7 UNITS/HR: 9 INJECTION, SOLUTION INTRAVENOUS at 08:51

## 2024-02-28 RX ADMIN — MIDAZOLAM 1 MG: 1 INJECTION INTRAMUSCULAR; INTRAVENOUS at 06:56

## 2024-02-28 RX ADMIN — METHADONE HYDROCHLORIDE 10 MG: 10 INJECTION, SOLUTION INTRAMUSCULAR; INTRAVENOUS; SUBCUTANEOUS at 07:53

## 2024-02-28 RX ADMIN — SODIUM CHLORIDE 500 MG PE: 900 INJECTION INTRAVENOUS at 08:20

## 2024-02-28 RX ADMIN — MORPHINE SULFATE 2 MG: 2 INJECTION, SOLUTION INTRAMUSCULAR; INTRAVENOUS at 18:34

## 2024-02-28 RX ADMIN — AMINOCAPROIC ACID 10 G: 250 INJECTION, SOLUTION INTRAVENOUS at 10:44

## 2024-02-28 NOTE — ANESTHESIA PROCEDURE NOTES
Central Line      Patient reassessed immediately prior to procedure    Patient location during procedure: OR  Start time: 2/28/2024 7:28 AM  Stop Time:2/28/2024 7:38 AM  Indications: vascular access  Staff  Anesthesiologist: Bret Nixon MD  Preanesthetic Checklist  Completed: patient identified, IV checked, site marked, risks and benefits discussed, surgical consent, monitors and equipment checked, pre-op evaluation and timeout performed  Central Line Prep  Sterile Tech:cap, gloves, gown, mask and sterile barriers  Prep: chloraprep  Patient monitoring: continuous pulse oximetry, blood pressure monitoring and EKG  Central Line Procedure  Laterality:right  Location:internal jugular  Catheter Type:Cordis and double lumen  Catheter Size:9 Fr  Guidance:ultrasound guided  PROCEDURE NOTE/ULTRASOUND INTERPRETATION.  Using ultrasound guidance the potential vascular sites for insertion of the catheter were visualized to determine the patency of the vessel to be used for vascular access.  After selecting the appropriate site for insertion, the needle was visualized under ultrasound being inserted into the internal jugular vein, followed by ultrasound confirmation of wire and catheter placement. There were no abnormalities seen on ultrasound; an image was taken; and the patient tolerated the procedure with no complications. Images: still images obtained, printed/placed on chart  Assessment  Post procedure:biopatch applied, line sutured and occlusive dressing applied  Assessement:blood return through all ports, free fluid flow, placement verified by x-ray, Anton Test, no pneumothorax on x-ray and chest x-ray ordered  Complications:no  Patient Tolerance:patient tolerated the procedure well with no apparent complications  Additional Notes  Ultrasound guidance was used for needle placement.

## 2024-02-28 NOTE — ANESTHESIA PROCEDURE NOTES
Central Line      Patient reassessed immediately prior to procedure    Patient location during procedure: OR  Start time: 2/28/2024 7:38 AM  Stop Time:2/28/2024 7:41 AM  Indications: central pressure monitoring  Staff  Anesthesiologist: Bret Nixon MD  Preanesthetic Checklist  Completed: patient identified, IV checked, site marked, risks and benefits discussed, surgical consent, monitors and equipment checked, pre-op evaluation and timeout performed  Central Line Prep  Sterile Tech:cap, gloves, gown, mask and sterile barriers  Prep: chloraprep  Patient monitoring: blood pressure monitoring, continuous pulse oximetry and EKG  Central Line Procedure  Laterality:right  Location:internal jugular  Catheter Type:[place]-Jarod  Assessment  Post procedure:line sutured, biopatch applied and occlusive dressing applied  Assessement:chest x-ray ordered, no pneumothorax on x-ray and free fluid flow  Complications:no  Patient Tolerance:patient tolerated the procedure well with no apparent complications

## 2024-02-28 NOTE — ANESTHESIA PROCEDURE NOTES
Airway  Urgency: elective    Date/Time: 2/28/2024 7:17 AM  Airway not difficult    General Information and Staff    Patient location during procedure: OR  Anesthesiologist: Bret Nixon MD    Indications and Patient Condition  Indications for airway management: airway protection    Preoxygenated: yes  Mask difficulty assessment: 1 - vent by mask    Final Airway Details  Final airway type: endotracheal airway      Successful airway: ETT  Cuffed: yes   Successful intubation technique: direct laryngoscopy  Facilitating devices/methods: intubating stylet  Endotracheal tube insertion site: oral  Blade: Rancho  Blade size: 4  ETT size (mm): 7.5  Cormack-Lehane Classification: grade IIb - view of arytenoids or posterior of glottis only  Placement verified by: chest auscultation   Measured from: lips  ETT/EBT  to lips (cm): 23  Number of attempts at approach: 1  Assessment: lips, teeth, and gum same as pre-op and atraumatic intubation

## 2024-02-28 NOTE — ANESTHESIA PROCEDURE NOTES
Arterial Line      Patient reassessed immediately prior to procedure    Patient location during procedure: OR  Start time: 2/28/2024 7:03 AM  Stop Time:2/28/2024 7:06 AM       Line placed for hemodynamic monitoring, ABGs/Labs/ISTAT and MD/Surgeon request.  Performed By   Anesthesiologist: Bret Nixon MD   Preanesthetic Checklist  Completed: patient identified, IV checked, site marked, risks and benefits discussed, surgical consent, monitors and equipment checked, pre-op evaluation and timeout performed  Arterial Line Prep    Sterile Tech: cap, gloves, mask and sterile barriers  Prep: ChloraPrep  Patient monitoring: blood pressure monitoring, continuous pulse oximetry and EKG  Arterial Line Procedure   Laterality:right  Location:  radial artery  Catheter size: 20 G   Guidance: palpation technique  Number of attempts: 1  Successful placement: yes Images: still images not obtained  Post Assessment   Dressing Type: wrist guard applied, occlusive dressing applied and secured with tape.   Complications no  Circ/Move/Sens Assessment: normal and unchanged.   Patient Tolerance: patient tolerated the procedure well with no apparent complications

## 2024-02-28 NOTE — ANESTHESIA PROCEDURE NOTES
Diagnostic IntraOp Orestes    Procedure Performed: Diagnostic IntraOp Orestes       Start Time:  2/28/2024 7:45 AM       End Time:   2/28/2024 7:58 AM    Preanesthesia Checklist:  Patient identified, IV assessed, risks and benefits discussed, monitors and equipment assessed, procedure being performed at surgeon's request and anesthesia consent obtained.    General Procedure Information  Diagnostic Indications for Echo:  assessment of ascending aorta, assessment of surgical repair and hemodynamic monitoring  Physician Requesting Echo: John Akhtar MD  CPT Code:  77093, 64583  ICD Code for Medical Necessity:  I70.0, I 34.0  Location performed:  OR  Intubated  Bite block placed  Heart visualized  Probe Insertion:  Easy  Probe Type:  Multiplane  Modalities:  Color flow mapping, pulse wave Doppler and continuous wave Doppler    Echocardiographic and Doppler Measurements    Ventricles    Right Ventricle:  Thrombus not present.  Global function normal.    Left Ventricle:  Thrombus not present.  Global Function normal.  Ejection Fraction 65%.          Valves    Aortic Valve:  Annulus dilated.  Stenosis not present.  Area: 3.09 cm².  Mean Gradient: 2 mmHg.  Regurgitation trace.  Leaflets normal.  Leaflet motions normal.      Mitral Valve:  Stenosis not present.  Mean Gradient: 2 mmHg.  Regurgitation mild.  Leaflets normal.      Tricuspid Valve:  Annulus dilated.  Stenosis not present.  Regurgitation mild.    Other Valve Findings:       STJ 3.28, S valsalva diameter 3.98, AV annular Diam 2.72    TV annulus 5.78cm     Aorta    Ascending Aorta:  Size dilated.  Diameter 4.8 cm.  Dissection not present.  Plaque thickness less than 3 mm.  Mobile plaque not present.    Aortic Arch:  Diameter 3.1 cm.  Dissection not present.  Plaque thickness less than 3 mm.  Mobile plaque not present.    Descending Aorta:  Diameter 3.4 cm.  Dissection not present.  Plaque thickness less than 3 mm.  Mobile plaque not present.        Atria    Right  Atrium:  Spontaneous echo contrast not present.  Thrombus not present.  Tumor not present.  Device not present.      Left Atrium:  Spontaneous echo contrast not present.  Thrombus not present.  Tumor not present.  Device not present.    Left atrial appendage normal.          Diastolic Function Measurements:  Diastolic Dysfunction Grade=  E=  58.6 ms  A=  65 ms  E/A Ratio=  .9  DT=  ms  S/D=  1.3  IVRT=    Other Findings  Pericardium:  normal  Pleural Effusion:  none  Pulmonary Venous Flow:  normal    Anesthesia Information  Performed Personally  Anesthesiologist:  Bret Nixon MD      Echocardiogram Comments:       Postbypass results:  AV trace AI no AS mean gradient 1 mmHg   MV trace MR no MS mean gradient 1 mmHg   TV mild TR annulus not enlarged   LVEF 55% via visual estimation

## 2024-02-29 ENCOUNTER — APPOINTMENT (OUTPATIENT)
Dept: GENERAL RADIOLOGY | Facility: HOSPITAL | Age: 74
DRG: 221 | End: 2024-02-29
Payer: MEDICARE

## 2024-02-29 LAB
ALBUMIN SERPL-MCNC: 3.9 G/DL (ref 3.5–5.2)
ANION GAP SERPL CALCULATED.3IONS-SCNC: 10.8 MMOL/L (ref 5–15)
BASOPHILS # BLD AUTO: 0 10*3/MM3 (ref 0–0.2)
BASOPHILS NFR BLD AUTO: 0 % (ref 0–1.5)
BH BB BLOOD EXPIRATION DATE: NORMAL
BH BB BLOOD TYPE BARCODE: 5100
BH BB BLOOD TYPE BARCODE: 6200
BH BB DISPENSE STATUS: NORMAL
BH BB PRODUCT CODE: NORMAL
BH BB UNIT NUMBER: NORMAL
BUN SERPL-MCNC: 17 MG/DL (ref 8–23)
BUN/CREAT SERPL: 14.4 (ref 7–25)
CA-I BLD-MCNC: 4.6 MG/DL (ref 4.6–5.4)
CA-I SERPL ISE-MCNC: 1.16 MMOL/L (ref 1.15–1.35)
CALCIUM SPEC-SCNC: 8 MG/DL (ref 8.6–10.5)
CHLORIDE SERPL-SCNC: 111 MMOL/L (ref 98–107)
CO2 SERPL-SCNC: 20.2 MMOL/L (ref 22–29)
CREAT SERPL-MCNC: 1.18 MG/DL (ref 0.76–1.27)
DEPRECATED RDW RBC AUTO: 44.6 FL (ref 37–54)
EGFRCR SERPLBLD CKD-EPI 2021: 64 ML/MIN/1.73
EOSINOPHIL # BLD AUTO: 0 10*3/MM3 (ref 0–0.4)
EOSINOPHIL NFR BLD AUTO: 0 % (ref 0.3–6.2)
ERYTHROCYTE [DISTWIDTH] IN BLOOD BY AUTOMATED COUNT: 13.1 % (ref 12.3–15.4)
GLUCOSE BLDC GLUCOMTR-MCNC: 130 MG/DL (ref 70–130)
GLUCOSE BLDC GLUCOMTR-MCNC: 133 MG/DL (ref 70–130)
GLUCOSE BLDC GLUCOMTR-MCNC: 133 MG/DL (ref 70–130)
GLUCOSE BLDC GLUCOMTR-MCNC: 135 MG/DL (ref 70–130)
GLUCOSE BLDC GLUCOMTR-MCNC: 140 MG/DL (ref 70–130)
GLUCOSE BLDC GLUCOMTR-MCNC: 140 MG/DL (ref 70–130)
GLUCOSE BLDC GLUCOMTR-MCNC: 147 MG/DL (ref 70–130)
GLUCOSE BLDC GLUCOMTR-MCNC: 148 MG/DL (ref 70–130)
GLUCOSE BLDC GLUCOMTR-MCNC: 151 MG/DL (ref 70–130)
GLUCOSE BLDC GLUCOMTR-MCNC: 154 MG/DL (ref 70–130)
GLUCOSE BLDC GLUCOMTR-MCNC: 158 MG/DL (ref 70–130)
GLUCOSE BLDC GLUCOMTR-MCNC: 168 MG/DL (ref 70–130)
GLUCOSE SERPL-MCNC: 137 MG/DL (ref 65–99)
HCT VFR BLD AUTO: 29.3 % (ref 37.5–51)
HGB BLD-MCNC: 9.9 G/DL (ref 13–17.7)
IMM GRANULOCYTES # BLD AUTO: 0.07 10*3/MM3 (ref 0–0.05)
IMM GRANULOCYTES NFR BLD AUTO: 0.8 % (ref 0–0.5)
INR PPP: 1.3 (ref 0.9–1.1)
LAB AP CASE REPORT: NORMAL
LYMPHOCYTES # BLD AUTO: 0.62 10*3/MM3 (ref 0.7–3.1)
LYMPHOCYTES NFR BLD AUTO: 7.2 % (ref 19.6–45.3)
MAGNESIUM SERPL-MCNC: 2.9 MG/DL (ref 1.6–2.4)
MCH RBC QN AUTO: 31.1 PG (ref 26.6–33)
MCHC RBC AUTO-ENTMCNC: 33.8 G/DL (ref 31.5–35.7)
MCV RBC AUTO: 92.1 FL (ref 79–97)
MONOCYTES # BLD AUTO: 0.78 10*3/MM3 (ref 0.1–0.9)
MONOCYTES NFR BLD AUTO: 9 % (ref 5–12)
NEUTROPHILS NFR BLD AUTO: 7.15 10*3/MM3 (ref 1.7–7)
NEUTROPHILS NFR BLD AUTO: 83 % (ref 42.7–76)
NRBC BLD AUTO-RTO: 0 /100 WBC (ref 0–0.2)
PATH REPORT.FINAL DX SPEC: NORMAL
PATH REPORT.GROSS SPEC: NORMAL
PHOSPHATE SERPL-MCNC: 4.4 MG/DL (ref 2.5–4.5)
PLATELET # BLD AUTO: 115 10*3/MM3 (ref 140–450)
PMV BLD AUTO: 9.7 FL (ref 6–12)
POTASSIUM SERPL-SCNC: 4.1 MMOL/L (ref 3.5–5.2)
PROTHROMBIN TIME: 16.4 SECONDS (ref 11.7–14.2)
QT INTERVAL: 412 MS
QTC INTERVAL: 384 MS
RBC # BLD AUTO: 3.18 10*6/MM3 (ref 4.14–5.8)
SODIUM SERPL-SCNC: 142 MMOL/L (ref 136–145)
UNIT  ABO: NORMAL
UNIT  RH: NORMAL
WBC NRBC COR # BLD AUTO: 8.62 10*3/MM3 (ref 3.4–10.8)

## 2024-02-29 PROCEDURE — P9041 ALBUMIN (HUMAN),5%, 50ML: HCPCS | Performed by: NURSE PRACTITIONER

## 2024-02-29 PROCEDURE — 99024 POSTOP FOLLOW-UP VISIT: CPT | Performed by: THORACIC SURGERY (CARDIOTHORACIC VASCULAR SURGERY)

## 2024-02-29 PROCEDURE — 71045 X-RAY EXAM CHEST 1 VIEW: CPT

## 2024-02-29 PROCEDURE — 25010000002 ENOXAPARIN PER 10 MG: Performed by: NURSE PRACTITIONER

## 2024-02-29 PROCEDURE — 85610 PROTHROMBIN TIME: CPT | Performed by: NURSE PRACTITIONER

## 2024-02-29 PROCEDURE — 25010000002 FUROSEMIDE PER 20 MG: Performed by: NURSE PRACTITIONER

## 2024-02-29 PROCEDURE — 97162 PT EVAL MOD COMPLEX 30 MIN: CPT

## 2024-02-29 PROCEDURE — 25010000002 ACETAMINOPHEN 10 MG/ML SOLUTION: Performed by: NURSE PRACTITIONER

## 2024-02-29 PROCEDURE — 82948 REAGENT STRIP/BLOOD GLUCOSE: CPT

## 2024-02-29 PROCEDURE — 25010000002 CALCIUM GLUCONATE 2-0.675 GM/100ML-% SOLUTION: Performed by: NURSE PRACTITIONER

## 2024-02-29 PROCEDURE — 25010000002 MAGNESIUM SULFATE 2 GM/50ML SOLUTION: Performed by: NURSE PRACTITIONER

## 2024-02-29 PROCEDURE — 93005 ELECTROCARDIOGRAM TRACING: CPT | Performed by: NURSE PRACTITIONER

## 2024-02-29 PROCEDURE — 80069 RENAL FUNCTION PANEL: CPT | Performed by: NURSE PRACTITIONER

## 2024-02-29 PROCEDURE — 63710000001 INSULIN LISPRO (HUMAN) PER 5 UNITS: Performed by: THORACIC SURGERY (CARDIOTHORACIC VASCULAR SURGERY)

## 2024-02-29 PROCEDURE — 94799 UNLISTED PULMONARY SVC/PX: CPT

## 2024-02-29 PROCEDURE — 83735 ASSAY OF MAGNESIUM: CPT | Performed by: NURSE PRACTITIONER

## 2024-02-29 PROCEDURE — 99221 1ST HOSP IP/OBS SF/LOW 40: CPT | Performed by: INTERNAL MEDICINE

## 2024-02-29 PROCEDURE — 97110 THERAPEUTIC EXERCISES: CPT

## 2024-02-29 PROCEDURE — 82330 ASSAY OF CALCIUM: CPT | Performed by: NURSE PRACTITIONER

## 2024-02-29 PROCEDURE — 25010000002 ONDANSETRON PER 1 MG: Performed by: NURSE PRACTITIONER

## 2024-02-29 PROCEDURE — 85025 COMPLETE CBC W/AUTO DIFF WBC: CPT | Performed by: NURSE PRACTITIONER

## 2024-02-29 PROCEDURE — 25010000002 CEFAZOLIN IN DEXTROSE 2-4 GM/100ML-% SOLUTION: Performed by: NURSE PRACTITIONER

## 2024-02-29 PROCEDURE — 93010 ELECTROCARDIOGRAM REPORT: CPT | Performed by: INTERNAL MEDICINE

## 2024-02-29 PROCEDURE — 25010000002 ALBUMIN HUMAN 5% PER 50 ML: Performed by: NURSE PRACTITIONER

## 2024-02-29 RX ORDER — IBUPROFEN 600 MG/1
1 TABLET ORAL
Status: DISCONTINUED | OUTPATIENT
Start: 2024-02-29 | End: 2024-03-03

## 2024-02-29 RX ORDER — POTASSIUM CHLORIDE 750 MG/1
20 TABLET, FILM COATED, EXTENDED RELEASE ORAL ONCE
Status: COMPLETED | OUTPATIENT
Start: 2024-02-29 | End: 2024-02-29

## 2024-02-29 RX ORDER — ALBUMIN, HUMAN INJ 5% 5 %
250 SOLUTION INTRAVENOUS ONCE
Status: COMPLETED | OUTPATIENT
Start: 2024-02-29 | End: 2024-02-29

## 2024-02-29 RX ORDER — INSULIN LISPRO 100 [IU]/ML
2-7 INJECTION, SOLUTION INTRAVENOUS; SUBCUTANEOUS
Status: DISCONTINUED | OUTPATIENT
Start: 2024-02-29 | End: 2024-03-03

## 2024-02-29 RX ORDER — CALCIUM GLUCONATE 20 MG/ML
2000 INJECTION, SOLUTION INTRAVENOUS ONCE
Status: COMPLETED | OUTPATIENT
Start: 2024-02-29 | End: 2024-02-29

## 2024-02-29 RX ORDER — NICOTINE POLACRILEX 4 MG
15 LOZENGE BUCCAL
Status: DISCONTINUED | OUTPATIENT
Start: 2024-02-29 | End: 2024-03-03

## 2024-02-29 RX ORDER — DEXTROSE MONOHYDRATE 25 G/50ML
25 INJECTION, SOLUTION INTRAVENOUS
Status: DISCONTINUED | OUTPATIENT
Start: 2024-02-29 | End: 2024-03-03

## 2024-02-29 RX ORDER — GABAPENTIN 100 MG/1
100 CAPSULE ORAL EVERY 12 HOURS SCHEDULED
Status: DISCONTINUED | OUTPATIENT
Start: 2024-02-29 | End: 2024-03-05 | Stop reason: HOSPADM

## 2024-02-29 RX ORDER — GUAIFENESIN 600 MG/1
1200 TABLET, EXTENDED RELEASE ORAL EVERY 12 HOURS SCHEDULED
Status: DISCONTINUED | OUTPATIENT
Start: 2024-02-29 | End: 2024-03-05 | Stop reason: HOSPADM

## 2024-02-29 RX ORDER — FUROSEMIDE 10 MG/ML
40 INJECTION INTRAMUSCULAR; INTRAVENOUS ONCE
Status: COMPLETED | OUTPATIENT
Start: 2024-02-29 | End: 2024-02-29

## 2024-02-29 RX ORDER — SIMETHICONE 80 MG
80 TABLET,CHEWABLE ORAL 4 TIMES DAILY PRN
Status: DISCONTINUED | OUTPATIENT
Start: 2024-02-29 | End: 2024-03-05 | Stop reason: HOSPADM

## 2024-02-29 RX ORDER — SODIUM BICARBONATE 650 MG/1
650 TABLET ORAL 2 TIMES DAILY
Status: DISPENSED | OUTPATIENT
Start: 2024-02-29 | End: 2024-03-01

## 2024-02-29 RX ADMIN — CEFAZOLIN SODIUM 2000 MG: 2 INJECTION, SOLUTION INTRAVENOUS at 11:24

## 2024-02-29 RX ADMIN — SODIUM BICARBONATE 650 MG: 650 TABLET ORAL at 10:35

## 2024-02-29 RX ADMIN — ATORVASTATIN CALCIUM 40 MG: 20 TABLET, FILM COATED ORAL at 21:42

## 2024-02-29 RX ADMIN — ALBUMIN (HUMAN) 250 ML: 12.5 INJECTION, SOLUTION INTRAVENOUS at 10:34

## 2024-02-29 RX ADMIN — 0.12% CHLORHEXIDINE GLUCONATE 15 ML: 1.2 RINSE ORAL at 11:24

## 2024-02-29 RX ADMIN — GABAPENTIN 100 MG: 100 CAPSULE ORAL at 21:43

## 2024-02-29 RX ADMIN — MUPIROCIN: 20 OINTMENT TOPICAL at 21:43

## 2024-02-29 RX ADMIN — CALCIUM GLUCONATE 2000 MG: 20 INJECTION, SOLUTION INTRAVENOUS at 08:19

## 2024-02-29 RX ADMIN — 0.12% CHLORHEXIDINE GLUCONATE 15 ML: 1.2 RINSE ORAL at 00:56

## 2024-02-29 RX ADMIN — OXYCODONE 10 MG: 5 TABLET ORAL at 14:03

## 2024-02-29 RX ADMIN — POTASSIUM CHLORIDE 20 MEQ: 750 TABLET, EXTENDED RELEASE ORAL at 11:24

## 2024-02-29 RX ADMIN — METOPROLOL TARTRATE 12.5 MG: 25 TABLET, FILM COATED ORAL at 21:41

## 2024-02-29 RX ADMIN — ASPIRIN 81 MG: 81 TABLET, COATED ORAL at 08:19

## 2024-02-29 RX ADMIN — ENOXAPARIN SODIUM 40 MG: 100 INJECTION SUBCUTANEOUS at 18:41

## 2024-02-29 RX ADMIN — HYDROCODONE BITARTRATE AND ACETAMINOPHEN 2 TABLET: 5; 325 TABLET ORAL at 08:19

## 2024-02-29 RX ADMIN — 0.12% CHLORHEXIDINE GLUCONATE 15 ML: 1.2 RINSE ORAL at 21:41

## 2024-02-29 RX ADMIN — OXYCODONE 10 MG: 5 TABLET ORAL at 18:40

## 2024-02-29 RX ADMIN — DOCUSATE SODIUM 50MG AND SENNOSIDES 8.6MG 2 TABLET: 8.6; 5 TABLET, FILM COATED ORAL at 21:42

## 2024-02-29 RX ADMIN — GUAIFENESIN 1200 MG: 600 TABLET, EXTENDED RELEASE ORAL at 08:19

## 2024-02-29 RX ADMIN — MUPIROCIN 1 APPLICATION: 20 OINTMENT TOPICAL at 08:19

## 2024-02-29 RX ADMIN — PANTOPRAZOLE SODIUM 40 MG: 40 TABLET, DELAYED RELEASE ORAL at 06:41

## 2024-02-29 RX ADMIN — FUROSEMIDE 40 MG: 10 INJECTION, SOLUTION INTRAMUSCULAR; INTRAVENOUS at 11:24

## 2024-02-29 RX ADMIN — GUAIFENESIN 1200 MG: 600 TABLET, EXTENDED RELEASE ORAL at 21:42

## 2024-02-29 RX ADMIN — CEFAZOLIN SODIUM 2000 MG: 2 INJECTION, SOLUTION INTRAVENOUS at 03:20

## 2024-02-29 RX ADMIN — CYCLOBENZAPRINE 10 MG: 10 TABLET, FILM COATED ORAL at 23:43

## 2024-02-29 RX ADMIN — CEFAZOLIN SODIUM 2000 MG: 2 INJECTION, SOLUTION INTRAVENOUS at 18:41

## 2024-02-29 RX ADMIN — ONDANSETRON 4 MG: 2 INJECTION INTRAMUSCULAR; INTRAVENOUS at 09:18

## 2024-02-29 RX ADMIN — INSULIN LISPRO 2 UNITS: 100 INJECTION, SOLUTION INTRAVENOUS; SUBCUTANEOUS at 21:47

## 2024-02-29 RX ADMIN — ACETAMINOPHEN 1000 MG: 10 INJECTION, SOLUTION INTRAVENOUS at 03:20

## 2024-02-29 NOTE — PLAN OF CARE
Goal Outcome Evaluation:           Progress: improving  Outcome Evaluation: Pt POD 1 AAR, VSS, c/o pain cont per MAR. Ambulated to room assist x2. Will update POC as needed.

## 2024-02-29 NOTE — PROGRESS NOTES
LOS: 1 day   Patient Care Team:  Hai Bender MD as PCP - General (Internal Medicine)  Suhail Bojorquez MD as Consulting Physician (Cardiology)    Chief Complaint: post op    Subjective:  Symptoms:  No shortness of breath or chest pressure.    Diet:  No nausea or vomiting.    Activity level: Impaired due to weakness.          Vital Signs  Temp:  [96.4 °F (35.8 °C)-98.2 °F (36.8 °C)] 97.7 °F (36.5 °C)  Heart Rate:  [74-89] 80  Resp:  [15] 15  BP: ()/(56-79) 79/60  FiO2 (%):  [39 %-99 %] 40 %  Body mass index is 34.06 kg/m².    Intake/Output Summary (Last 24 hours) at 2/29/2024 0711  Last data filed at 2/29/2024 0657  Gross per 24 hour   Intake 4936.7 ml   Output 4810 ml   Net 126.7 ml     No intake/output data recorded.    Chest tube drainage last 8 hours 40/140        02/29/24  0600   Weight: 95.7 kg (211 lb)         Objective:  General Appearance:  Comfortable and in no acute distress.    Vital signs: (most recent): Blood pressure 107/63, pulse 89, temperature 98.8 °F (37.1 °C), temperature source Core, resp. rate 15, weight 95.7 kg (211 lb), SpO2 94%.  Vital signs are normal.    Output: Producing urine.    HEENT: Normal HEENT exam.    Lungs:  Normal effort and normal respiratory rate.    Heart: Normal rate.    Neurological: Patient is alert and oriented to person, place and time.                Results Review:        WBC WBC   Date Value Ref Range Status   02/29/2024 8.62 3.40 - 10.80 10*3/mm3 Final   02/28/2024 8.72 3.40 - 10.80 10*3/mm3 Final   02/28/2024 11.65 (H) 3.40 - 10.80 10*3/mm3 Final   02/28/2024 11.38 (H) 3.40 - 10.80 10*3/mm3 Final   02/28/2024 11.30 (H) 3.40 - 10.80 10*3/mm3 Final   02/26/2024 6.17 3.40 - 10.80 10*3/mm3 Final      HGB Hemoglobin   Date Value Ref Range Status   02/29/2024 9.9 (L) 13.0 - 17.7 g/dL Final   02/28/2024 11.0 (L) 13.0 - 17.7 g/dL Final   02/28/2024 11.0 (L) 13.0 - 17.7 g/dL Final   02/28/2024 10.8 (L) 13.0 - 17.7 g/dL Final   02/28/2024 8.8 (L) 12.0 -  17.0 g/dL Final   02/28/2024 8.8 (L) 13.0 - 17.7 g/dL Final   02/28/2024 8.8 (L) 12.0 - 17.0 g/dL Final   02/28/2024 9.2 (L) 12.0 - 17.0 g/dL Final   02/28/2024 9.5 (L) 12.0 - 17.0 g/dL Final   02/28/2024 9.5 (L) 12.0 - 17.0 g/dL Final   02/28/2024 9.2 (L) 12.0 - 17.0 g/dL Final   02/28/2024 11.6 (L) 12.0 - 17.0 g/dL Final   02/26/2024 12.5 (L) 13.0 - 17.7 g/dL Final      HCT Hematocrit   Date Value Ref Range Status   02/29/2024 29.3 (L) 37.5 - 51.0 % Final   02/28/2024 32.4 (L) 37.5 - 51.0 % Final   02/28/2024 32.9 (L) 37.5 - 51.0 % Final   02/28/2024 31.1 (L) 37.5 - 51.0 % Final   02/28/2024 26 (L) 38 - 51 % Final   02/28/2024 26.6 (L) 37.5 - 51.0 % Final   02/28/2024 26 (L) 38 - 51 % Final   02/28/2024 27 (L) 38 - 51 % Final   02/28/2024 28 (L) 38 - 51 % Final   02/28/2024 28 (L) 38 - 51 % Final   02/28/2024 27 (L) 38 - 51 % Final   02/28/2024 34 (L) 38 - 51 % Final   02/26/2024 37.2 (L) 37.5 - 51.0 % Final      Platelets Platelets   Date Value Ref Range Status   02/29/2024 115 (L) 140 - 450 10*3/mm3 Final   02/28/2024 135 (L) 140 - 450 10*3/mm3 Final   02/28/2024 139 (L) 140 - 450 10*3/mm3 Final   02/28/2024 123 (L) 140 - 450 10*3/mm3 Final   02/28/2024 120 (L) 140 - 450 10*3/mm3 Final   02/26/2024 192 140 - 450 10*3/mm3 Final        PT/INR:    Protime   Date Value Ref Range Status   02/29/2024 16.4 (H) 11.7 - 14.2 Seconds Final   02/28/2024 17.6 (H) 11.7 - 14.2 Seconds Final   02/28/2024 19.1 (H) 11.7 - 14.2 Seconds Final   02/28/2024 16.8 (H) 12.8 - 15.2 seconds Final     Comment:     Serial Number: [id number]:  [id number]   02/26/2024 13.9 11.7 - 14.2 Seconds Final   /  INR   Date Value Ref Range Status   02/29/2024 1.30 (H) 0.90 - 1.10 Final   02/28/2024 1.42 (H) 0.90 - 1.10 Final   02/28/2024 1.57 (H) 0.90 - 1.10 Final   02/28/2024 1.4 (H) 0.8 - 1.2 Final   02/26/2024 1.06 0.90 - 1.10 Final       Sodium Sodium   Date Value Ref Range Status   02/29/2024 142 136 - 145 mmol/L Final   02/28/2024 145 136 - 145  mmol/L Final   02/28/2024 142 136 - 145 mmol/L Final   02/26/2024 141 136 - 145 mmol/L Final      Potassium Potassium   Date Value Ref Range Status   02/29/2024 4.1 3.5 - 5.2 mmol/L Final   02/28/2024 4.6 3.5 - 5.2 mmol/L Final     Comment:     Slight hemolysis detected by analyzer. Result may be falsely elevated.   02/28/2024 3.8 3.5 - 5.2 mmol/L Final     Comment:     Slight hemolysis detected by analyzer. Result may be falsely elevated.   02/26/2024 3.3 (L) 3.5 - 5.2 mmol/L Final      Chloride Chloride   Date Value Ref Range Status   02/29/2024 111 (H) 98 - 107 mmol/L Final   02/28/2024 112 (H) 98 - 107 mmol/L Final   02/28/2024 108 (H) 98 - 107 mmol/L Final   02/26/2024 106 98 - 107 mmol/L Final      Bicarbonate CO2   Date Value Ref Range Status   02/29/2024 20.2 (L) 22.0 - 29.0 mmol/L Final   02/28/2024 24.0 22.0 - 29.0 mmol/L Final   02/28/2024 25.0 22.0 - 29.0 mmol/L Final   02/26/2024 24.5 22.0 - 29.0 mmol/L Final      BUN BUN   Date Value Ref Range Status   02/29/2024 17 8 - 23 mg/dL Final   02/28/2024 14 8 - 23 mg/dL Final   02/28/2024 13 8 - 23 mg/dL Final   02/26/2024 13 8 - 23 mg/dL Final      Creatinine Creatinine   Date Value Ref Range Status   02/29/2024 1.18 0.76 - 1.27 mg/dL Final   02/28/2024 1.14 0.76 - 1.27 mg/dL Final   02/28/2024 1.25 0.76 - 1.27 mg/dL Final   02/26/2024 1.04 0.76 - 1.27 mg/dL Final      Calcium Calcium   Date Value Ref Range Status   02/29/2024 8.0 (L) 8.6 - 10.5 mg/dL Final   02/28/2024 8.6 8.6 - 10.5 mg/dL Final   02/28/2024 8.7 8.6 - 10.5 mg/dL Final   02/26/2024 8.9 8.6 - 10.5 mg/dL Final      Magnesium Magnesium   Date Value Ref Range Status   02/29/2024 2.9 (H) 1.6 - 2.4 mg/dL Final   02/28/2024 2.5 (H) 1.6 - 2.4 mg/dL Final   02/28/2024 2.8 (H) 1.6 - 2.4 mg/dL Final   02/26/2024 2.2 1.6 - 2.4 mg/dL Final          aspirin, 81 mg, Oral, Daily  atorvastatin, 40 mg, Oral, Nightly  ceFAZolin, 2,000 mg, Intravenous, Q8H  chlorhexidine, 15 mL, Mouth/Throat, Q12H  enoxaparin,  40 mg, Subcutaneous, Daily  magnesium sulfate, 2 g, Intravenous, Q8H  metoclopramide, 10 mg, Intravenous, Q6H  metoprolol tartrate, 12.5 mg, Oral, Q12H  mupirocin, , Each Nare, BID  pantoprazole, 40 mg, Intravenous, Once   Followed by  pantoprazole, 40 mg, Oral, QAM  senna-docusate sodium, 2 tablet, Oral, Nightly      clevidipine, 2-32 mg/hr  dexmedetomidine, 0.2-1.5 mcg/kg/hr, Last Rate: Stopped (02/29/24 0300)  DOPamine, 2-20 mcg/kg/min  EPINEPHrine, 0.02-0.1 mcg/kg/min  insulin, 0-100 Units/hr, Last Rate: 0.3 Units/hr (02/29/24 0500)  milrinone, 0.25-0.375 mcg/kg/min  niCARdipine, 5-15 mg/hr, Last Rate: Stopped (02/28/24 1200)  nitroglycerin, 5-200 mcg/min  norepinephrine, 0.02-0.2 mcg/kg/min, Last Rate: 0.02 mcg/kg/min (02/29/24 0525)  phenylephrine, 0.2-2 mcg/kg/min  propofol, 5-50 mcg/kg/min, Last Rate: Stopped (02/28/24 1245)  sodium chloride, 30 mL/hr, Last Rate: 30 mL/hr (02/28/24 1156)              Aneurysm of ascending aorta    Ascending aortic aneurysm      Assessment & Plan    -Ascending aortic aneurysm- s/p ascending aortic aneurysm repair- POD#1 Pagni  -liver cyst  -post op anemia- expected acute blood loss     Looks good this morning  Up in the chair  2L NC--wean as able  Sinus rhythm rate in the 70s-- pacing for blood pressure support  On a touch of levophed this morning-- replete calcium wean as able  Encourage pulmonary toilet-- continue IS, will add flutter valve and mucinex  Mobilize  Transfer to stepdown whenever able    ADELINA Rodriguez  02/29/24  07:11 EST

## 2024-02-29 NOTE — PLAN OF CARE
Goal Outcome Evaluation:  Plan of Care Reviewed With: patient           Outcome Evaluation: Pt is POD 1 ascending aortic aneurysm repair, he is seen in CVR, sitting up in chair upon entry, pt did well this morning, able to walk 50 ft with min assist. Pt lives with his spouse and is independent at baseline, one step to enter home, level once inside. Pt presents with post op pain, weakness, and impaired funcional mobility and will benefit from PT to address, anticipate good progress with mobility and that pt will return home at discharge.      Anticipated Discharge Disposition (PT): home with assist

## 2024-02-29 NOTE — CONSULTS
[facility] Consult Note       Encounter Date:24  Patient:Nirav Szymanski  :1950  MRN:[id number]    Date of Admission: 2024  Date of Encounter Visit: 24  Encounter Provider: Lexx Tanner MD  Referring Provider: John Akhtar MD  Place of Service: [facility]  Patient Care Team:  Hai Bender MD as PCP - General (Internal Medicine)  Suhail Bojorquez MD as Consulting Physician (Cardiology)      Consulted for: Post-op Ascending aortic aneurysm repair    Chief Complaint: Aneurysm Ascending Aorta      History of Presenting Illness:        Mr. Szymanski is a 74 y.o. gentleman with past medical history notable for ascending aortic aneurysm who was recently seen in the office by one of my partners Dr. Bojorquez for initial visit back in January.  He actually originally resides in [place] but has been in [place] for about a year helping take care of his mother-in-law.  He was noted to have a enlarging ascending aortic aneurysm at 5.2-5.3 and underwent further evaluation with our cardiac surgeons elected to have elective ascending aortic aneurysm repair on 2024.  He is now postop.  Does have a lot of incisional pain some discomfort in his arm from where his heart cath was performed but otherwise is doing fairly well.  His blood pressure is low and still on a low-dose Levophed drip but in general is making decent progress.    Review of Systems:  Review of Systems   Constitutional: Positive for malaise/fatigue.   HENT: Negative.     Eyes: Negative.    Cardiovascular: Negative.    Respiratory: Negative.     Endocrine: Negative.    Hematologic/Lymphatic: Negative.    Skin: Negative.    Musculoskeletal: Negative.    Gastrointestinal: Negative.    Genitourinary: Negative.    Neurological: Negative.    Psychiatric/Behavioral: Negative.     Allergic/Immunologic: Negative.        Medications:    Current Facility-Administered Medications:     acetaminophen  (TYLENOL) tablet 650 mg, 650 mg, Oral, Q4H PRN **OR** acetaminophen (TYLENOL) 160 MG/5ML oral solution 650 mg, 650 mg, Oral, Q4H PRN **OR** acetaminophen (TYLENOL) suppository 650 mg, 650 mg, Rectal, Q4H PRN, Amrita Lin APRN    albumin human 5 % solution 1,000 mL, 1,000 mL, Intravenous, PRN, Amrita Lin, APRN, 500 mL at 02/28/24 1355    ALPRAZolam (XANAX) tablet 0.25 mg, 0.25 mg, Oral, Q8H PRN, Amrita Lin APRN    aspirin EC tablet 81 mg, 81 mg, Oral, Daily, Amrita Lin, ADELINA    atorvastatin (LIPITOR) tablet 40 mg, 40 mg, Oral, Nightly, Amrita Lin, APRN, 40 mg at 02/28/24 2035    bisacodyl (DULCOLAX) EC tablet 10 mg, 10 mg, Oral, Daily PRN, Amrita Lin, APRN    bisacodyl (DULCOLAX) suppository 10 mg, 10 mg, Rectal, Daily PRN, Amrita Lin APROLEG    ceFAZolin in dextrose (ANCEF) IVPB solution 2,000 mg, 2,000 mg, Intravenous, Q8H, Amrita Lin APRN, 2,000 mg at 02/29/24 0320    chlorhexidine (PERIDEX) 0.12 % solution 15 mL, 15 mL, Mouth/Throat, Q12H, Amrita Lin APRN, 15 mL at 02/29/24 0056    clevidipine (CLEVIPREX) infusion 0.5 mg/mL, 2-32 mg/hr, Intravenous, Continuous PRN, Amrita Lin APRN    cyclobenzaprine (FLEXERIL) tablet 10 mg, 10 mg, Oral, Q8H PRN, Amrita Lin, ADELINA    dexmedetomidine (PRECEDEX) 400 mcg in 100 mL NS infusion, 0.2-1.5 mcg/kg/hr, Intravenous, Titrated, Amrita Lin APRN, Stopped at 02/29/24 0300    dextrose (D50W) (25 g/50 mL) IV injection 10-50 mL, 10-50 mL, Intravenous, Q15 Min PRN, Amrita Lin APRN    dextrose (GLUTOSE) oral gel 15 g, 15 g, Oral, Q15 Min PRN, Amrita Lin APRN    DOPamine 400 mg in 250 mL D5W infusion, 2-20 mcg/kg/min, Intravenous, Continuous PRN, Amrita Lin APRN    Enoxaparin Sodium (LOVENOX) syringe 40 mg, 40 mg, Subcutaneous, Daily, Amrita Lin APRN    EPINEPHrine 5 mg in 250 mL NS infusion, 0.02-0.1 mcg/kg/min, Intravenous, Continuous PRN, Amrita Lin APRN    glucagon  (GLUCAGEN) injection 1 mg, 1 mg, Intramuscular, Q15 Min PRN, Amrita Lin, APRN    HYDROcodone-acetaminophen (NORCO) 5-325 MG per tablet 2 tablet, 2 tablet, Oral, Q4H PRN, Amrita Lin, APROLEG    insulin regular 1 unit/mL in 0.9% sodium chloride (Glucommander), 0-100 Units/hr, Intravenous, Titrated, Amrita Lin APRN, Last Rate: 0.3 mL/hr at 02/29/24 0500, 0.3 Units/hr at 02/29/24 0500    magnesium hydroxide (MILK OF MAGNESIA) suspension 10 mL, 10 mL, Oral, Daily PRN, Amrita Lin APRN    magnesium sulfate 2g/50 mL (PREMIX) infusion, 2 g, Intravenous, Q8H, Amrita Lin APRN, 2 g at 02/28/24 1953    metoclopramide (REGLAN) injection 10 mg, 10 mg, Intravenous, Q6H, Amrita Lin APRN    metoprolol tartrate (LOPRESSOR) tablet 12.5 mg, 12.5 mg, Oral, Q12H, Amrita Lin APROLEG    midazolam (VERSED) injection 2 mg, 2 mg, Intravenous, Q1H PRN, Amrita Lin APRN    milrinone (PRIMACOR) 20 mg in 100 mL D5W infusion, 0.25-0.375 mcg/kg/min, Intravenous, Continuous PRN, Amrita Lin APROLEG    morphine injection 1 mg, 1 mg, Intravenous, Q4H PRN **AND** naloxone (NARCAN) injection 0.4 mg, 0.4 mg, Intravenous, Q5 Min PRN, Amrita Lin APROLEG    morphine injection 4 mg, 4 mg, Intravenous, Q30 Min PRN, Amrita Lin APRN, 2 mg at 02/28/24 1834    mupirocin (BACTROBAN) 2 % nasal ointment, , Each Nare, BID, Amrita Lin APRN, 1 Application at 02/28/24 2035    niCARdipine (CARDENE) 25 mg in 250 mL NS infusion kit, 5-15 mg/hr, Intravenous, Continuous PRN, Amrita Lin APRN, Stopped at 02/28/24 1200    nitroglycerin (NITROSTAT) SL tablet 0.4 mg, 0.4 mg, Sublingual, Q5 Min PRN, Amrita Lin APRN    nitroglycerin (TRIDIL) 200 mcg/ml infusion, 5-200 mcg/min, Intravenous, Titrated, Amrita Lin APRN    norepinephrine (LEVOPHED) 8 mg in 250 mL NS infusion (premix), 0.02-0.2 mcg/kg/min, Intravenous, Continuous PRN, Amrita Lin APRN, Last Rate: 3.5 mL/hr at 02/29/24 0525, 0.02  mcg/kg/min at 02/29/24 0525    ondansetron (ZOFRAN) injection 4 mg, 4 mg, Intravenous, Q6H PRN, Amrita Lin APRN    oxyCODONE (ROXICODONE) immediate release tablet 10 mg, 10 mg, Oral, Q4H PRN, Amrita Lin APRN, 10 mg at 02/28/24 1859    pantoprazole (PROTONIX) injection 40 mg, 40 mg, Intravenous, Once **FOLLOWED BY** pantoprazole (PROTONIX) EC tablet 40 mg, 40 mg, Oral, QAM, Amrita Lin APRN, 40 mg at 02/29/24 0641    phenylephrine (ARMIDA-SYNEPHRINE) 50 mg in 250 mL NS infusion, 0.2-2 mcg/kg/min, Intravenous, Continuous PRN, Amrita Lin APRN    polyethylene glycol (MIRALAX) packet 17 g, 17 g, Oral, Daily PRN, Amrita Lin APRN    polyethylene glycol 1% (ARTIFICIAL TEARS) ophthalmic solution, 1 drop, Both Eyes, Q1H PRN, John Akhtar MD    Potassium Replacement - Follow Nurse / BPA Driven Protocol, , Does not apply, PRN, Amrita Lin APRN    propofol (DIPRIVAN) infusion 10 mg/mL 100 mL, 5-50 mcg/kg/min, Intravenous, Continuous PRN, Amrita Lin APRN, Stopped at 02/28/24 1245    sennosides-docusate (PERICOLACE) 8.6-50 MG per tablet 2 tablet, 2 tablet, Oral, Nightly, Amrita Lin APRN    sodium chloride 0.9 % infusion, 30 mL/hr, Intravenous, Continuous, Amrita Lin APRN, Last Rate: 30 mL/hr at 02/28/24 1156, 30 mL/hr at 02/28/24 1156    Facility-Administered Medications Ordered in Other Encounters:     Chlorhexidine Gluconate Cloth 2 % pads, , Topical, Q12H PRN, Leslie Handy APRN    Allergies   Allergen Reactions    Famotidine Swelling and Rash    Ranitidine Hcl Swelling       Past Medical History:   Diagnosis Date    Ascending aortic aneurysm     5.3 PER CARDIOLOGY NOTE    Colon polyps     GERD (gastroesophageal reflux disease)     History of prostate cancer 2003    PONV (postoperative nausea and vomiting)        Past Surgical History:   Procedure Laterality Date    ASCENDING AORTIC ANEURYSM REPAIR W/ MECHANICAL AORTIC VALVE REPLACEMENT N/A 2/28/2024     Procedure: CELESTINE, STERNOTOMY, THORACIC AORTIC ANEURYSM REPAIR, CIRCULATORY ARREST, NEURO MONITORING, PRP;  Surgeon: John Akhtar MD;  Location: [facility] CVOR;  Service: Cardiothoracic;  Laterality: N/A;    CARDIAC CATHETERIZATION N/A 02/05/2024    Procedure: Left Heart Cath;  Surgeon: Suhail Bojorquez MD;  Location: [facility] CATH INVASIVE LOCATION;  Service: Cardiology;  Laterality: N/A;    CARDIAC CATHETERIZATION N/A 02/05/2024    Procedure: Coronary angiography;  Surgeon: Suhail Bojorquez MD;  Location: [facility] CATH INVASIVE LOCATION;  Service: Cardiology;  Laterality: N/A;    COLONOSCOPY      COLONOSCOPY N/A 02/20/2024    Procedure: COLONOSCOPY to cecum and into the terminal ileum with cold snare polypectomies;  Surgeon: Lexx Hill MD;  Location: [facility] ENDOSCOPY;  Service: Gastroenterology;  Laterality: N/A;  Pre: screening, hx polyps  Post: external hemorrhoids, polyps, tattoo, diverticulosis    INGUINAL HERNIA REPAIR Right     NASAL POLYP EXCISION      PROSTATECTOMY  2004    ROTATOR CUFF REPAIR Left     2005    SINUS SURGERY      TESTICLE SURGERY      CHILDHOOD       Social History     Socioeconomic History    Marital status:    Tobacco Use    Smoking status: Never    Smokeless tobacco: Never   Vaping Use    Vaping Use: Never used   Substance and Sexual Activity    Alcohol use: Not Currently    Drug use: Never    Sexual activity: Defer       Family History   Problem Relation Age of Onset    Pancreatic cancer Mother     Aneurysm Maternal Grandfather     Malig Hyperthermia Neg Hx        The following portions of the patient's history were reviewed and updated as appropriate: allergies, current medications, past family history, past medical history, past social history, past surgical history and problem list.         Objective:      Temp:  [96.4 °F (35.8 °C)-98.2 °F (36.8 °C)] 97.7 °F (36.5 °C)  Heart Rate:  [74-89] 80  Resp:  [15] 15  BP: ()/(56-79) 79/60  FiO2 (%):  [39 %-99 %] 40  %     Intake/Output Summary (Last 24 hours) at 2/29/2024 0709  Last data filed at 2/29/2024 0657  Gross per 24 hour   Intake 4936.7 ml   Output 4810 ml   Net 126.7 ml     Body mass index is 34.06 kg/m².      02/29/24  0600   Weight: 95.7 kg (211 lb)           Physical Exam:  Constitutional: Well appearing, well developed, no acute distress   HENT: Oropharynx clear and membrane moist  Eyes: Normal conjunctiva, no sclera icterus.  Neck: Supple, no carotid bruit bilaterally.  Cardiovascular: Regular rate and rhythm, No Murmur, No bilateral lower extremity edema. +Rub  Pulmonary: Normal respiratory effort, normal lung sounds, no wheezing.  Abdominal: Soft, nontender, no hepatosplenomegaly, liver is non-pulsatile.  Neurological: Alert and orient x 3.   Skin: Warm, dry, no ecchymosis, no rash.  Psych: Appropriate mood and affect. Normal judgment and insight.         Lab Review:   Results from last 7 days   Lab Units 02/29/24  0320 02/28/24  1550 02/28/24  1125 02/26/24  0733   SODIUM mmol/L 142 145 142 141   POTASSIUM mmol/L 4.1 4.6 3.8 3.3*   CHLORIDE mmol/L 111* 112* 108* 106   CO2 mmol/L 20.2* 24.0 25.0 24.5   BUN mg/dL 17 14 13 13   CREATININE mg/dL 1.18 1.14 1.25 1.04   GLUCOSE mg/dL 137* 140* 141* 107*   CALCIUM mg/dL 8.0* 8.6 8.7 8.9   AST (SGOT) U/L  --   --   --  16   ALT (SGPT) U/L  --   --   --  9         Results from last 7 days   Lab Units 02/29/24  0320 02/28/24  1550 02/28/24  1125 02/28/24  1043 02/28/24  1042 02/28/24  0952 02/28/24  0937 02/28/24  0800 02/26/24  0733   WBC 10*3/mm3 8.62 8.72 11.65*  11.38*  --  11.30*  --   --   --  6.17   HEMOGLOBIN g/dL 9.9* 11.0* 11.0*  10.8*  --  8.8*  --   --   --  12.5*   HEMOGLOBIN, POC g/dL  --   --   --  8.8*  --  8.8* 9.2*   < >  --    HEMATOCRIT % 29.3* 32.4* 32.9*  31.1*  --  26.6*  --   --   --  37.2*   HEMATOCRIT POC %  --   --   --  26*  --  26* 27*   < >  --    PLATELETS 10*3/mm3 115* 135* 139*  123*  --  120*  --   --   --  192    < > = values in  this interval not displayed.     Results from last 7 days   Lab Units 02/29/24  0320 02/28/24  1125 02/28/24  1042 02/26/24  0733   INR  1.30* 1.42* 1.57*  1.4* 1.06   APTT seconds  --  30.5 33.1 29.5     Results from last 7 days   Lab Units 02/29/24  0320 02/28/24  1550 02/28/24  1125 02/26/24  0733   MAGNESIUM mg/dL 2.9* 2.5* 2.8* 2.2     Results from last 7 days   Lab Units 02/26/24  0733   CHOLESTEROL mg/dL 147   TRIGLYCERIDES mg/dL 60   HDL CHOL mg/dL 48     The ASCVD Risk score (Yung DK, et al., 2019) failed to calculate for the following reasons:    The valid systolic blood pressure range is 90 to 200 mmHg     Results from last 7 days   Lab Units 02/26/24  0733   PROBNP pg/mL 133.0                 I had reviewed his EKGs from above which demonstrates sinus rhythm no acute ST changes        Cardiac catheterization 2/5/2024 with images reviewed by myself:  Angiographically normal coronary arteries  LVEDP 10 mmHg       Echocardiogram 12/15/2023 [facility]:   Left Ventricle: Normal left ventricular systolic function with a   visually estimated EF of 55 - 60%. Left ventricle size is normal. Mildly   increased wall thickness.   Left Ventricle: Normal wall motion.   Aorta: Normal sized abdominal aorta. Mildly dilated aortic root. Ao   root diameter is 3.9 cm. Moderately dilated ascending aorta. Ao ascending   diameter is 5.3 cm, consistent with CTA of Chest (12/8/2023) and Previous   echo (5/8/2023). Descending Thoracic aorta measures 3.9 cm.   Left Ventricle: Grade I diastolic dysfunction.   Left Atrium: Left atrium is mildly dilated.   Aortic Valve: Mild sclerosis of the aortic valve, left cusp.   Mitral Valve: Mild regurgitation.   Tricuspid Valve: Mildly elevated RVSP. The estimated RVSP is 35 mmHg.   Interatrial Septum: No interatrial shunt visualized with color Doppler.   Interatrial Septum: Atrial septal aneurysm present.   Aorta: Normal sized abdominal aorta. Mildly dilated aortic root. Ao  root diameter is 3.9 cm. Moderately dilated ascending aorta. Ao ascending diameter is 5.3 cm, consistent with CTA of Chest (12/8/2023) and Previous echo (5/8/2023).Descending Thoracic aorta measures 3.9 cm.   Pericardium: No pericardial effusion.   Image quality is fair.          Assessment:           Aneurysm of ascending aorta    Ascending aortic aneurysm         Plan:       Mr. Szymanski is a 74 y.o. gentleman with past medical history notable for ascending aortic aneurysm who presents for elective a ascending aortic aneurysm repair underwent surgery on 2/28/2024.  Making a good steady recovery.      Ascending aortic aneurysm repair:  Making good steady recovery  Continue to wean pressors as tolerated  Would hold beta-blocker at this juncture given hypotension but can hopefully titrate on later    Anemia:  Continue to monitor and transfuse per CT surgery recommendation          Thank you for allowing me to participate in the care of Nirav Szymanski. Feel free to contact me directly with any further questions or concerns.    Lexx Tanner MD  [facility]  02/29/24  07:09 EST

## 2024-02-29 NOTE — THERAPY EVALUATION
Patient Name: Nirav Szymanski  : 1950    MRN: [id number]                              Today's Date: 2024       Admit Date: 2024    Visit Dx:     ICD-10-CM ICD-9-CM   1. Aneurysm of ascending aorta without rupture  I71.21 441.2     Patient Active Problem List   Diagnosis    Right flank pain    Vitamin D deficiency    Vasomotor rhinitis    Spinal stenosis of lumbar region    Liver cyst    History of temporal arteritis    GERD (gastroesophageal reflux disease)    DDD (degenerative disc disease), lumbar    Aneurysm of ascending aorta    Allergic rhinitis    Adenomatous polyp of colon    Hernia of abdominal wall    Abnormal findings on diagnostic imaging of other specified body structures    Ascending aortic aneurysm     Past Medical History:   Diagnosis Date    Ascending aortic aneurysm     5.3 PER CARDIOLOGY NOTE    Colon polyps     GERD (gastroesophageal reflux disease)     History of prostate cancer     PONV (postoperative nausea and vomiting)      Past Surgical History:   Procedure Laterality Date    ASCENDING AORTIC ANEURYSM REPAIR W/ MECHANICAL AORTIC VALVE REPLACEMENT N/A 2024    Procedure: CELESTINE, STERNOTOMY, THORACIC AORTIC ANEURYSM REPAIR, CIRCULATORY ARREST, NEURO MONITORING, PRP;  Surgeon: John Akhtar MD;  Location: [facility] CVOR;  Service: Cardiothoracic;  Laterality: N/A;    CARDIAC CATHETERIZATION N/A 2024    Procedure: Left Heart Cath;  Surgeon: Suhail Bojorquez MD;  Location: [facility] CATH INVASIVE LOCATION;  Service: Cardiology;  Laterality: N/A;    CARDIAC CATHETERIZATION N/A 2024    Procedure: Coronary angiography;  Surgeon: Suhail Bojorquez MD;  Location: [facility] CATH INVASIVE LOCATION;  Service: Cardiology;  Laterality: N/A;    COLONOSCOPY      COLONOSCOPY N/A 2024    Procedure: COLONOSCOPY to cecum and into the terminal ileum with cold snare polypectomies;  Surgeon: Lexx Hill MD;  Location: [facility] ENDOSCOPY;  Service: Gastroenterology;  Laterality:  N/A;  Pre: screening, hx polyps  Post: external hemorrhoids, polyps, tattoo, diverticulosis    INGUINAL HERNIA REPAIR Right     NASAL POLYP EXCISION      PROSTATECTOMY  2004    ROTATOR CUFF REPAIR Left     2005    SINUS SURGERY      TESTICLE SURGERY      CHILDHOOD      General Information       Row Name 02/29/24 1031          Physical Therapy Time and Intention    Document Type evaluation  -PC     Mode of Treatment physical therapy  -       Row Name 02/29/24 1031          General Information    Patient Profile Reviewed yes  -PC     Prior Level of Function independent:  -PC     Existing Precautions/Restrictions cardiac;sternal;fall  -PC     Barriers to Rehab none identified  -       Row Name 02/29/24 1031          Living Environment    People in Home spouse  -PC       Row Name 02/29/24 1031          Home Main Entrance    Number of Stairs, Main Entrance one  -PC       Row Name 02/29/24 1031          Stairs Within Home, Primary    Number of Stairs, Within Home, Primary none  -       Row Name 02/29/24 1031          Cognition    Orientation Status (Cognition) oriented x 3  -PC       Row Name 02/29/24 1031          Safety Issues, Functional Mobility    Impairments Affecting Function (Mobility) endurance/activity tolerance;shortness of breath;strength;pain  -PC               User Key  (r) = Recorded By, (t) = Taken By, (c) = Cosigned By      Initials Name Provider Type    PC Makayla Mac PT Physical Therapist                   Mobility       Row Name 02/29/24 1031          Bed Mobility    Bed Mobility sit-supine  -PC     Supine-Sit [place] (Bed Mobility) moderate assist (50% patient effort);2 person assist  -PC       Row Name 02/29/24 1031          Sit-Stand Transfer    Sit-Stand [place] (Transfers) minimum assist (75% patient effort)  -       Row Name 02/29/24 1031          Gait/Stairs (Locomotion)    [place] Level (Gait) minimum assist (75% patient effort);2 person assist  -PC     Assistive  Device (Gait) --  HHA x 2  -PC     Distance in Feet (Gait) 50  -PC     Deviations/Abnormal Patterns (Gait) irene decreased;gait speed decreased;stride length decreased;antalgic  -PC               User Key  (r) = Recorded By, (t) = Taken By, (c) = Cosigned By      Initials Name Provider Type    PC Makayla Mac, PT Physical Therapist                   Obj/Interventions       Row Name 02/29/24 1033          Range of Motion Comprehensive    General Range of Motion no range of motion deficits identified  -PC       Row Name 02/29/24 1033          Strength Comprehensive (MMT)    Comment, General Manual Muscle Testing (MMT) Assessment no focal deficits, general post op weakness present  -PC       Row Name 02/29/24 1033          Motor Skills    Therapeutic Exercise --  5 reps cardiac protocol  -[facility] Name 02/29/24 1033          Balance    Comment, Balance WNL  -PC               User Key  (r) = Recorded By, (t) = Taken By, (c) = Cosigned By      Initials Name Provider Type    PC Makayla Mac, PT Physical Therapist                   Goals/Plan       Row Name 02/29/24 1038          Problem Specific Goal 1 (PT)    Problem Specific Goal 1 (PT) cardiac level 3  -PC     Time Frame (Problem Specific Goal 1, PT) 1 week  -PC       [facility] Name 02/29/24 1038          Therapy Assessment/Plan (PT)    Planned Therapy Interventions (PT) bed mobility training;gait training;transfer training;strengthening  -PC               User Key  (r) = Recorded By, (t) = Taken By, (c) = Cosigned By      Initials Name Provider Type    PC Makayla Mac, PT Physical Therapist                   Clinical Impression       [facility] Name 02/29/24 1033          Pain    Pain Location incisional  -PC     Pain Location - chest  -PC     Pre/Posttreatment Pain Comment did not rate numerically  -PC     Pain Intervention(s) Repositioned  -PC       Row Name 02/29/24 1033          Plan of Care Review    Plan of Care Reviewed With patient  -PC     Outcome  Evaluation Pt is POD 1 ascending aortic aneurysm repair, he is seen in CVR, sitting up in chair upon entry, pt did well this morning, able to walk 50 ft with min assist. Pt lives with his spouse and is independent at baseline, one step to enter home, level once inside. Pt presents with post op pain, weakness, and impaired funcional mobility and will benefit from PT to address, anticipate good progress with mobility and that pt will return home at discharge.  -PC       Row Name 02/29/24 1033          Therapy Assessment/Plan (PT)    Rehab Potential (PT) good, to achieve stated therapy goals  -PC     Criteria for Skilled Interventions Met (PT) yes;meets criteria  -PC     Therapy Frequency (PT) daily  -PC       Row Name 02/29/24 1033          Vital Signs    Pre Systolic BP Rehab 99  -PC     Pre Treatment Diastolic BP 63  -PC     Post Systolic BP Rehab 101  -PC     Post Treatment Diastolic BP 46  -PC     Pretreatment Heart Rate (beats/min) 88  -PC     Posttreatment Heart Rate (beats/min) 88  -PC     Pre SpO2 (%) 95  -PC     O2 Delivery Pre Treatment supplemental O2  -PC     Post SpO2 (%) 94  -PC     O2 Delivery Post Treatment supplemental O2  -PC       Row Name 02/29/24 1033          Positioning and Restraints    Pre-Treatment Position sitting in chair/recliner  -PC     Post Treatment Position bed  -PC     In Bed supine;call light within reach;encouraged to call for assist;patient within staff view  -PC               User Key  (r) = Recorded By, (t) = Taken By, (c) = Cosigned By      Initials Name Provider Type    PC Makayla Mac, PT Physical Therapist                   Outcome Measures       Row Name 02/29/24 1038          How much help from another person do you currently need...    Turning from your back to your side while in flat bed without using bedrails? 2  -PC     Moving from lying on back to sitting on the side of a flat bed without bedrails? 2  -PC     Moving to and from a bed to a chair (including a  wheelchair)? 3  -PC     Standing up from a chair using your arms (e.g., wheelchair, bedside chair)? 3  -PC     Climbing 3-5 steps with a railing? 2  -PC     To walk in hospital room? 3  -PC     AM-PAC 6 Clicks Score (PT) 15  -PC     Highest Level of Mobility Goal 4 --> Transfer to chair/commode  -PC       Row Name 02/29/24 1038          Functional Assessment    Outcome Measure Options AM-PAC 6 Clicks Basic Mobility (PT)  -PC               User Key  (r) = Recorded By, (t) = Taken By, (c) = Cosigned By      Initials Name Provider Type    PC Makayla Mac, PT Physical Therapist                                 Physical Therapy Education       Title: PT OT SLP Therapies (Done)       Topic: Physical Therapy (Done)       Point: Mobility training (Done)       Learning Progress Summary             Patient Acceptance, E,D, DU by PC at 2/29/2024 1039                         Point: Home exercise program (Done)       Learning Progress Summary             Patient Acceptance, E,D, DU by PC at 2/29/2024 1039                         Point: Body mechanics (Done)       Learning Progress Summary             Patient Acceptance, E,D, DU by PC at 2/29/2024 1039                         Point: Precautions (Done)       Learning Progress Summary             Patient Acceptance, E,D, DU by PC at 2/29/2024 1039                                         User Key       Initials Effective Dates Name Provider Type Discipline     06/16/21 -  Makayla Mac, PT Physical Therapist PT                  PT Recommendation and Plan  Planned Therapy Interventions (PT): bed mobility training, gait training, transfer training, strengthening  Plan of Care Reviewed With: patient  Outcome Evaluation: Pt is POD 1 ascending aortic aneurysm repair, he is seen in CVR, sitting up in chair upon entry, pt did well this morning, able to walk 50 ft with min assist. Pt lives with his spouse and is independent at baseline, one step to enter home, level once inside. Pt  presents with post op pain, weakness, and impaired funcional mobility and will benefit from PT to address, anticipate good progress with mobility and that pt will return home at discharge.     Time Calculation:         PT Charges       Row Name 02/29/24 1040             Time Calculation    Start Time 0907  -PC      Stop Time 0930  -PC      Time Calculation (min) 23 min  -PC      PT Received On 02/29/24  -PC      PT - Next Appointment 03/01/24  -PC      PT Goal Re-Cert Due Date 03/07/24  -PC                User Key  (r) = Recorded By, (t) = Taken By, (c) = Cosigned By      Initials Name Provider Type    PC Makayla Mac, PT Physical Therapist                  Therapy Charges for Today       Code Description Service Date Service Provider Modifiers Qty    64593483171 HC PT EVAL MOD COMPLEXITY 2 2/29/2024 Makayla Mac, PT GP 1    13394235497 HC PT THER PROC EA 15 MIN 2/29/2024 Makayla Mac, PT GP 1            PT G-Codes  Outcome Measure Options: AM-PAC 6 Clicks Basic Mobility (PT)  AM-PAC 6 Clicks Score (PT): 15  PT Discharge Summary  Anticipated Discharge Disposition (PT): home with assist    Makayla Mac PT  2/29/2024

## 2024-03-01 ENCOUNTER — APPOINTMENT (OUTPATIENT)
Dept: GENERAL RADIOLOGY | Facility: HOSPITAL | Age: 74
DRG: 221 | End: 2024-03-01
Payer: MEDICARE

## 2024-03-01 LAB
ANION GAP SERPL CALCULATED.3IONS-SCNC: 13.6 MMOL/L (ref 5–15)
BUN SERPL-MCNC: 30 MG/DL (ref 8–23)
BUN/CREAT SERPL: 23.6 (ref 7–25)
CALCIUM SPEC-SCNC: 8.2 MG/DL (ref 8.6–10.5)
CHLORIDE SERPL-SCNC: 105 MMOL/L (ref 98–107)
CO2 SERPL-SCNC: 22.4 MMOL/L (ref 22–29)
CREAT SERPL-MCNC: 1.27 MG/DL (ref 0.76–1.27)
DEPRECATED RDW RBC AUTO: 44.1 FL (ref 37–54)
EGFRCR SERPLBLD CKD-EPI 2021: 58.6 ML/MIN/1.73
ERYTHROCYTE [DISTWIDTH] IN BLOOD BY AUTOMATED COUNT: 12.9 % (ref 12.3–15.4)
GLUCOSE BLDC GLUCOMTR-MCNC: 117 MG/DL (ref 70–130)
GLUCOSE BLDC GLUCOMTR-MCNC: 131 MG/DL (ref 70–130)
GLUCOSE BLDC GLUCOMTR-MCNC: 133 MG/DL (ref 70–130)
GLUCOSE BLDC GLUCOMTR-MCNC: 157 MG/DL (ref 70–130)
GLUCOSE SERPL-MCNC: 122 MG/DL (ref 65–99)
HCT VFR BLD AUTO: 29.3 % (ref 37.5–51)
HGB BLD-MCNC: 9.9 G/DL (ref 13–17.7)
MCH RBC QN AUTO: 31.5 PG (ref 26.6–33)
MCHC RBC AUTO-ENTMCNC: 33.8 G/DL (ref 31.5–35.7)
MCV RBC AUTO: 93.3 FL (ref 79–97)
PLATELET # BLD AUTO: 139 10*3/MM3 (ref 140–450)
PMV BLD AUTO: 10.4 FL (ref 6–12)
POTASSIUM SERPL-SCNC: 4.3 MMOL/L (ref 3.5–5.2)
QT INTERVAL: 341 MS
QT INTERVAL: 352 MS
QTC INTERVAL: 461 MS
QTC INTERVAL: 517 MS
RBC # BLD AUTO: 3.14 10*6/MM3 (ref 4.14–5.8)
SODIUM SERPL-SCNC: 141 MMOL/L (ref 136–145)
WBC NRBC COR # BLD AUTO: 13.75 10*3/MM3 (ref 3.4–10.8)

## 2024-03-01 PROCEDURE — 93005 ELECTROCARDIOGRAM TRACING: CPT | Performed by: NURSE PRACTITIONER

## 2024-03-01 PROCEDURE — 94799 UNLISTED PULMONARY SVC/PX: CPT

## 2024-03-01 PROCEDURE — 93010 ELECTROCARDIOGRAM REPORT: CPT | Performed by: INTERNAL MEDICINE

## 2024-03-01 PROCEDURE — 25010000002 CEFAZOLIN IN DEXTROSE 2-4 GM/100ML-% SOLUTION: Performed by: NURSE PRACTITIONER

## 2024-03-01 PROCEDURE — 80048 BASIC METABOLIC PNL TOTAL CA: CPT | Performed by: NURSE PRACTITIONER

## 2024-03-01 PROCEDURE — 25010000002 CALCIUM GLUCONATE 2-0.675 GM/100ML-% SOLUTION: Performed by: NURSE PRACTITIONER

## 2024-03-01 PROCEDURE — 82948 REAGENT STRIP/BLOOD GLUCOSE: CPT

## 2024-03-01 PROCEDURE — 97530 THERAPEUTIC ACTIVITIES: CPT

## 2024-03-01 PROCEDURE — 25010000002 ENOXAPARIN PER 10 MG: Performed by: NURSE PRACTITIONER

## 2024-03-01 PROCEDURE — 99024 POSTOP FOLLOW-UP VISIT: CPT | Performed by: THORACIC SURGERY (CARDIOTHORACIC VASCULAR SURGERY)

## 2024-03-01 PROCEDURE — 25010000002 AMIODARONE IN DEXTROSE 5% 150-4.21 MG/100ML-% SOLUTION: Performed by: NURSE PRACTITIONER

## 2024-03-01 PROCEDURE — 25010000002 MAGNESIUM SULFATE 2 GM/50ML SOLUTION: Performed by: NURSE PRACTITIONER

## 2024-03-01 PROCEDURE — 25010000002 AMIODARONE IN DEXTROSE 5% 360-4.14 MG/200ML-% SOLUTION: Performed by: NURSE PRACTITIONER

## 2024-03-01 PROCEDURE — 99232 SBSQ HOSP IP/OBS MODERATE 35: CPT | Performed by: INTERNAL MEDICINE

## 2024-03-01 PROCEDURE — 85027 COMPLETE CBC AUTOMATED: CPT | Performed by: NURSE PRACTITIONER

## 2024-03-01 PROCEDURE — 93005 ELECTROCARDIOGRAM TRACING: CPT | Performed by: THORACIC SURGERY (CARDIOTHORACIC VASCULAR SURGERY)

## 2024-03-01 PROCEDURE — 71045 X-RAY EXAM CHEST 1 VIEW: CPT

## 2024-03-01 RX ORDER — LIDOCAINE 4 G/G
2 PATCH TOPICAL
Status: DISCONTINUED | OUTPATIENT
Start: 2024-03-01 | End: 2024-03-05 | Stop reason: HOSPADM

## 2024-03-01 RX ORDER — ATENOLOL 25 MG/1
25 TABLET ORAL EVERY 12 HOURS SCHEDULED
Status: DISCONTINUED | OUTPATIENT
Start: 2024-03-01 | End: 2024-03-01

## 2024-03-01 RX ORDER — ATENOLOL 25 MG/1
25 TABLET ORAL EVERY 12 HOURS SCHEDULED
Status: DISCONTINUED | OUTPATIENT
Start: 2024-03-01 | End: 2024-03-05 | Stop reason: HOSPADM

## 2024-03-01 RX ORDER — POTASSIUM CHLORIDE 750 MG/1
20 TABLET, FILM COATED, EXTENDED RELEASE ORAL DAILY
Status: DISCONTINUED | OUTPATIENT
Start: 2024-03-02 | End: 2024-03-03

## 2024-03-01 RX ORDER — CALCIUM GLUCONATE 20 MG/ML
2000 INJECTION, SOLUTION INTRAVENOUS ONCE
Status: DISCONTINUED | OUTPATIENT
Start: 2024-03-01 | End: 2024-03-01

## 2024-03-01 RX ORDER — FUROSEMIDE 40 MG/1
40 TABLET ORAL DAILY
Status: DISCONTINUED | OUTPATIENT
Start: 2024-03-02 | End: 2024-03-03

## 2024-03-01 RX ORDER — CALCIUM GLUCONATE 20 MG/ML
2000 INJECTION, SOLUTION INTRAVENOUS ONCE
Status: COMPLETED | OUTPATIENT
Start: 2024-03-01 | End: 2024-03-01

## 2024-03-01 RX ORDER — MAGNESIUM SULFATE HEPTAHYDRATE 40 MG/ML
2 INJECTION, SOLUTION INTRAVENOUS ONCE
Status: COMPLETED | OUTPATIENT
Start: 2024-03-01 | End: 2024-03-01

## 2024-03-01 RX ADMIN — ACETAMINOPHEN 325MG 650 MG: 325 TABLET ORAL at 09:04

## 2024-03-01 RX ADMIN — OXYCODONE 10 MG: 5 TABLET ORAL at 05:29

## 2024-03-01 RX ADMIN — MUPIROCIN 1 APPLICATION: 20 OINTMENT TOPICAL at 08:16

## 2024-03-01 RX ADMIN — DOCUSATE SODIUM 50MG AND SENNOSIDES 8.6MG 2 TABLET: 8.6; 5 TABLET, FILM COATED ORAL at 21:38

## 2024-03-01 RX ADMIN — ENOXAPARIN SODIUM 40 MG: 100 INJECTION SUBCUTANEOUS at 19:24

## 2024-03-01 RX ADMIN — ATORVASTATIN CALCIUM 40 MG: 20 TABLET, FILM COATED ORAL at 21:39

## 2024-03-01 RX ADMIN — PANTOPRAZOLE SODIUM 40 MG: 40 TABLET, DELAYED RELEASE ORAL at 08:10

## 2024-03-01 RX ADMIN — ATENOLOL 25 MG: 25 TABLET ORAL at 19:00

## 2024-03-01 RX ADMIN — ASPIRIN 81 MG: 81 TABLET, COATED ORAL at 08:14

## 2024-03-01 RX ADMIN — MAGNESIUM SULFATE HEPTAHYDRATE 2 G: 40 INJECTION, SOLUTION INTRAVENOUS at 08:15

## 2024-03-01 RX ADMIN — CEFAZOLIN SODIUM 2000 MG: 2 INJECTION, SOLUTION INTRAVENOUS at 04:30

## 2024-03-01 RX ADMIN — HYDROCODONE BITARTRATE AND ACETAMINOPHEN 2 TABLET: 5; 325 TABLET ORAL at 14:40

## 2024-03-01 RX ADMIN — AMIODARONE HYDROCHLORIDE 150 MG: 1.5 INJECTION, SOLUTION INTRAVENOUS at 08:15

## 2024-03-01 RX ADMIN — GABAPENTIN 100 MG: 100 CAPSULE ORAL at 08:10

## 2024-03-01 RX ADMIN — AMIODARONE HYDROCHLORIDE 0.5 MG/MIN: 1.8 INJECTION, SOLUTION INTRAVENOUS at 16:14

## 2024-03-01 RX ADMIN — LIDOCAINE 2 PATCH: 4 PATCH TOPICAL at 14:40

## 2024-03-01 RX ADMIN — MUPIROCIN 1 APPLICATION: 20 OINTMENT TOPICAL at 21:39

## 2024-03-01 RX ADMIN — GUAIFENESIN 1200 MG: 600 TABLET, EXTENDED RELEASE ORAL at 08:14

## 2024-03-01 RX ADMIN — 0.12% CHLORHEXIDINE GLUCONATE 15 ML: 1.2 RINSE ORAL at 14:40

## 2024-03-01 RX ADMIN — HYDROCODONE BITARTRATE AND ACETAMINOPHEN 2 TABLET: 5; 325 TABLET ORAL at 22:15

## 2024-03-01 RX ADMIN — CALCIUM GLUCONATE 2000 MG: 20 INJECTION, SOLUTION INTRAVENOUS at 08:14

## 2024-03-01 RX ADMIN — GABAPENTIN 100 MG: 100 CAPSULE ORAL at 21:38

## 2024-03-01 RX ADMIN — AMIODARONE HYDROCHLORIDE 1 MG/MIN: 1.8 INJECTION, SOLUTION INTRAVENOUS at 08:54

## 2024-03-01 RX ADMIN — GUAIFENESIN 1200 MG: 600 TABLET, EXTENDED RELEASE ORAL at 21:38

## 2024-03-01 NOTE — PROGRESS NOTES
LOS: 2 days   Patient Care Team:  Hai Bender MD as PCP - General (Internal Medicine)  Suhail Bojorquez MD as Consulting Physician (Cardiology)    Chief Complaint: post op    Subjective:  Symptoms:  No shortness of breath or chest pressure.    Diet:  No nausea or vomiting.    Activity level: Impaired due to weakness.          Vital Signs  Temp:  [98.5 °F (36.9 °C)-99.9 °F (37.7 °C)] 99.8 °F (37.7 °C)  Heart Rate:  [] 140  Resp:  [16] 16  BP: ()/(56-72) 96/68  Body mass index is 34.06 kg/m².    Intake/Output Summary (Last 24 hours) at 3/1/2024 0839  Last data filed at 3/1/2024 0600  Gross per 24 hour   Intake 608 ml   Output 1835 ml   Net -1227 ml     No intake/output data recorded.    Chest tube drainage last 8 hours 90/30 02/29/24  0600   Weight: 95.7 kg (211 lb)         Objective:  General Appearance:  Comfortable and in no acute distress.    Vital signs: (most recent): Blood pressure 96/68, pulse (!) 140, temperature 99.8 °F (37.7 °C), temperature source Oral, resp. rate 16, weight 95.7 kg (211 lb), SpO2 92%.  Vital signs are normal.    Output: Producing urine.    HEENT: Normal HEENT exam.    Lungs:  Normal effort and normal respiratory rate.    Heart: Normal rate.    Neurological: Patient is alert and oriented to person, place and time.                Results Review:        WBC WBC   Date Value Ref Range Status   03/01/2024 13.75 (H) 3.40 - 10.80 10*3/mm3 Final   02/29/2024 8.62 3.40 - 10.80 10*3/mm3 Final   02/28/2024 8.72 3.40 - 10.80 10*3/mm3 Final   02/28/2024 11.65 (H) 3.40 - 10.80 10*3/mm3 Final   02/28/2024 11.38 (H) 3.40 - 10.80 10*3/mm3 Final   02/28/2024 11.30 (H) 3.40 - 10.80 10*3/mm3 Final      HGB Hemoglobin   Date Value Ref Range Status   03/01/2024 9.9 (L) 13.0 - 17.7 g/dL Final   02/29/2024 9.9 (L) 13.0 - 17.7 g/dL Final   02/28/2024 11.0 (L) 13.0 - 17.7 g/dL Final   02/28/2024 11.0 (L) 13.0 - 17.7 g/dL Final   02/28/2024 10.8 (L) 13.0 - 17.7 g/dL Final    02/28/2024 8.8 (L) 12.0 - 17.0 g/dL Final   02/28/2024 8.8 (L) 13.0 - 17.7 g/dL Final   02/28/2024 8.8 (L) 12.0 - 17.0 g/dL Final   02/28/2024 9.2 (L) 12.0 - 17.0 g/dL Final   02/28/2024 9.5 (L) 12.0 - 17.0 g/dL Final   02/28/2024 9.5 (L) 12.0 - 17.0 g/dL Final   02/28/2024 9.2 (L) 12.0 - 17.0 g/dL Final   02/28/2024 11.6 (L) 12.0 - 17.0 g/dL Final      HCT Hematocrit   Date Value Ref Range Status   03/01/2024 29.3 (L) 37.5 - 51.0 % Final   02/29/2024 29.3 (L) 37.5 - 51.0 % Final   02/28/2024 32.4 (L) 37.5 - 51.0 % Final   02/28/2024 32.9 (L) 37.5 - 51.0 % Final   02/28/2024 31.1 (L) 37.5 - 51.0 % Final   02/28/2024 26 (L) 38 - 51 % Final   02/28/2024 26.6 (L) 37.5 - 51.0 % Final   02/28/2024 26 (L) 38 - 51 % Final   02/28/2024 27 (L) 38 - 51 % Final   02/28/2024 28 (L) 38 - 51 % Final   02/28/2024 28 (L) 38 - 51 % Final   02/28/2024 27 (L) 38 - 51 % Final   02/28/2024 34 (L) 38 - 51 % Final      Platelets Platelets   Date Value Ref Range Status   03/01/2024 139 (L) 140 - 450 10*3/mm3 Final   02/29/2024 115 (L) 140 - 450 10*3/mm3 Final   02/28/2024 135 (L) 140 - 450 10*3/mm3 Final   02/28/2024 139 (L) 140 - 450 10*3/mm3 Final   02/28/2024 123 (L) 140 - 450 10*3/mm3 Final   02/28/2024 120 (L) 140 - 450 10*3/mm3 Final        PT/INR:    Protime   Date Value Ref Range Status   02/29/2024 16.4 (H) 11.7 - 14.2 Seconds Final   02/28/2024 17.6 (H) 11.7 - 14.2 Seconds Final   02/28/2024 19.1 (H) 11.7 - 14.2 Seconds Final   02/28/2024 16.8 (H) 12.8 - 15.2 seconds Final     Comment:     Serial Number: [id number]:  [id number]   /  INR   Date Value Ref Range Status   02/29/2024 1.30 (H) 0.90 - 1.10 Final   02/28/2024 1.42 (H) 0.90 - 1.10 Final   02/28/2024 1.57 (H) 0.90 - 1.10 Final   02/28/2024 1.4 (H) 0.8 - 1.2 Final       Sodium Sodium   Date Value Ref Range Status   03/01/2024 141 136 - 145 mmol/L Final   02/29/2024 142 136 - 145 mmol/L Final   02/28/2024 145 136 - 145 mmol/L Final   02/28/2024 142 136 - 145 mmol/L Final       Potassium Potassium   Date Value Ref Range Status   03/01/2024 4.3 3.5 - 5.2 mmol/L Final   02/29/2024 4.1 3.5 - 5.2 mmol/L Final   02/28/2024 4.6 3.5 - 5.2 mmol/L Final     Comment:     Slight hemolysis detected by analyzer. Result may be falsely elevated.   02/28/2024 3.8 3.5 - 5.2 mmol/L Final     Comment:     Slight hemolysis detected by analyzer. Result may be falsely elevated.      Chloride Chloride   Date Value Ref Range Status   03/01/2024 105 98 - 107 mmol/L Final   02/29/2024 111 (H) 98 - 107 mmol/L Final   02/28/2024 112 (H) 98 - 107 mmol/L Final   02/28/2024 108 (H) 98 - 107 mmol/L Final      Bicarbonate CO2   Date Value Ref Range Status   03/01/2024 22.4 22.0 - 29.0 mmol/L Final   02/29/2024 20.2 (L) 22.0 - 29.0 mmol/L Final   02/28/2024 24.0 22.0 - 29.0 mmol/L Final   02/28/2024 25.0 22.0 - 29.0 mmol/L Final      BUN BUN   Date Value Ref Range Status   03/01/2024 30 (H) 8 - 23 mg/dL Final   02/29/2024 17 8 - 23 mg/dL Final   02/28/2024 14 8 - 23 mg/dL Final   02/28/2024 13 8 - 23 mg/dL Final      Creatinine Creatinine   Date Value Ref Range Status   03/01/2024 1.27 0.76 - 1.27 mg/dL Final   02/29/2024 1.18 0.76 - 1.27 mg/dL Final   02/28/2024 1.14 0.76 - 1.27 mg/dL Final   02/28/2024 1.25 0.76 - 1.27 mg/dL Final      Calcium Calcium   Date Value Ref Range Status   03/01/2024 8.2 (L) 8.6 - 10.5 mg/dL Final   02/29/2024 8.0 (L) 8.6 - 10.5 mg/dL Final   02/28/2024 8.6 8.6 - 10.5 mg/dL Final   02/28/2024 8.7 8.6 - 10.5 mg/dL Final      Magnesium Magnesium   Date Value Ref Range Status   02/29/2024 2.9 (H) 1.6 - 2.4 mg/dL Final   02/28/2024 2.5 (H) 1.6 - 2.4 mg/dL Final   02/28/2024 2.8 (H) 1.6 - 2.4 mg/dL Final          aspirin, 81 mg, Oral, Daily  atorvastatin, 40 mg, Oral, Nightly  calcium gluconate, 2,000 mg, Intravenous, Once  chlorhexidine, 15 mL, Mouth/Throat, Q12H  enoxaparin, 40 mg, Subcutaneous, Daily  gabapentin, 100 mg, Oral, Q12H  guaiFENesin, 1,200 mg, Oral, Q12H  insulin lispro, 2-7 Units,  Subcutaneous, 4x Daily AC & at Bedtime  metoprolol tartrate, 12.5 mg, Oral, Q12H  mupirocin, , Each Nare, BID  pantoprazole, 40 mg, Intravenous, Once   Followed by  pantoprazole, 40 mg, Oral, QAM  senna-docusate sodium, 2 tablet, Oral, Nightly  sodium bicarbonate, 650 mg, Oral, BID      amiodarone, 1 mg/min   Followed by  amiodarone, 0.5 mg/min  clevidipine, 2-32 mg/hr  DOPamine, 2-20 mcg/kg/min  EPINEPHrine, 0.02-0.1 mcg/kg/min  milrinone, 0.25-0.375 mcg/kg/min  niCARdipine, 5-15 mg/hr, Last Rate: Stopped (02/28/24 1200)  norepinephrine, 0.02-0.2 mcg/kg/min, Last Rate: Stopped (02/29/24 0830)  phenylephrine, 0.2-2 mcg/kg/min  propofol, 5-50 mcg/kg/min, Last Rate: Stopped (02/28/24 1245)  sodium chloride, 30 mL/hr, Last Rate: 30 mL/hr (02/28/24 1156)              Aneurysm of ascending aorta    Ascending aortic aneurysm      Assessment & Plan    -Ascending aortic aneurysm- s/p ascending aortic aneurysm repair- POD#2 Giovana  -liver cyst  -post op anemia- expected acute blood loss     Looks good this morning  Up in the chair  2L NC--wean as able  Atrial fibrillation this morning-- rate in the 130s-- IV amiodarone, replete magnesium  Blood pressure borderline-- replete calcium  Encourage pulmonary toilet-- continue IS, flutter valve and mucinex  Mobilize  Will discuss with Dr. Akhtar about chest tube removal  Will leave central line while he is on amiodarone  Continue supportive care    ADELINA Rodriguez  03/01/24  08:39 EST

## 2024-03-01 NOTE — CASE MANAGEMENT/SOCIAL WORK
Discharge Planning Assessment  [facility]     Patient Name: Nirav Szymanski  MRN: [id number]  Today's Date: 3/1/2024    Admit Date: 2/28/2024    Plan: Home w/ Hua QUINTANA   Discharge Needs Assessment       Row Name 03/01/24 1412       Living Environment    People in Home spouse    Name(s) of People in Home Dinora Szymanski/wife    Current Living Arrangements condominium  first floor [place]    Potentially Unsafe Housing Conditions none    In the past 12 months has the electric, gas, oil, or water company threatened to shut off services in your home? No    Primary Care Provided by self    Provides Primary Care For no one    Family Caregiver if Needed spouse    Family Caregiver Names Dinora    Quality of Family Relationships helpful;involved;supportive    Able to Return to Prior Arrangements yes       Resource/Environmental Concerns    Resource/Environmental Concerns none    Transportation Concerns none       Transportation Needs    In the past 12 months, has lack of transportation kept you from medical appointments or from getting medications? no    In the past 12 months, has lack of transportation kept you from meetings, work, or from getting things needed for daily living? No       Food Insecurity    Within the past 12 months, you worried that your food would run out before you got the money to buy more. Never true    Within the past 12 months, the food you bought just didn't last and you didn't have money to get more. Never true       Transition Planning    Patient/Family Anticipates Transition to home with family    Patient/Family Anticipated Services at Transition home health care    Transportation Anticipated family or friend will provide       Discharge Needs Assessment    Readmission Within the Last 30 Days no previous admission in last 30 days    Equipment Currently Used at Home bp cuff;scales;grab bar;bath bench    Concerns to be Addressed discharge planning    Anticipated Changes Related to Illness none     Equipment Needed After Discharge none    Discharge Facility/Level of Care Needs home with home health    Provided Post Acute Provider List? Yes    Post Acute Provider List Home Health    Delivered To Patient    Method of Delivery In person    Patient's Choice of Community Agency(s) [facility]                   Discharge Plan       Row Name 03/01/24 1416       Plan    Plan Home w/ [facility]     Plan Comments CCP spoke with Pt and spouse/Dinora Szymanski, at bedside.  CCP role explained and discharge planning discussed.  Face sheet verified.  Pt stated he is IADL's, retired and drives.  Pt lives in a first- floor condominium with one entrance stair step.  Pt reports PCP is, Hai Bender.  Pt confirmed pharmacy is, [facility] in [place].  Pt denies use of past home health or going to a sub-acute rehab.  Pt has the following DME- BP cuff, scale, grab bar and bath bench.  Pt plans to return home at discharge with assistance of spouse.  Pt chose [facility] and they accepted.  CCP will continue to follow…….Malathi MARTINEZ /GILDARDO.                  Continued Care and Services - Admitted Since 2/28/2024       Home Medical Care Coordination complete.      Service Provider Request Status Selected Services Address Phone Fax Patient Preferred     [facility]  Selected Home Health Services [address] [address] [phone] --                  Expected Discharge Date and Time       Expected Discharge Date Expected Discharge Time    Mar 5, 2024            Demographic Summary       Row Name 03/01/24 1412       General Information    Admission Type inpatient    Arrived From home    Required Notices Provided Important Message from Medicare    Referral Source admission list;physician    Reason for Consult discharge planning    Preferred Language English       Contact Information    Permission Granted to Share Info With family/designee                   Functional Status       Row Name 03/01/24  1412       Functional Status    Usual Activity Tolerance good    Current Activity Tolerance moderate       Assessment of Health Literacy    Health Literacy Good       Functional Status, IADL    Medications independent    Meal Preparation independent    Housekeeping independent    Laundry independent       Mental Status    General Appearance WDL WDL       Mental Status Summary    Recent Changes in Mental Status/Cognitive Functioning no changes       Employment/    Employment Status retired                   Psychosocial    No documentation.                  Abuse/Neglect    No documentation.                  Legal    No documentation.                  Substance Abuse    No documentation.                  Patient Forms    No documentation.                     Malathi Ferris RN

## 2024-03-01 NOTE — PROGRESS NOTES
[facility] Progress Note       Encounter Date:24  Patient:Nirav Szymanski  :1950  MRN:[id number]      Chief Complaint: Follow up Aneurysm       Subjective:        Went into atrial fibrillation this AM but otherwise doing well      Review of Systems:  Review of Systems   Constitutional: Positive for malaise/fatigue.   Cardiovascular:  Positive for palpitations.   Respiratory:  Positive for shortness of breath.        Medications:  Scheduled Meds:  aspirin, 81 mg, Oral, Daily  atorvastatin, 40 mg, Oral, Nightly  chlorhexidine, 15 mL, Mouth/Throat, Q12H  enoxaparin, 40 mg, Subcutaneous, Daily  gabapentin, 100 mg, Oral, Q12H  guaiFENesin, 1,200 mg, Oral, Q12H  insulin lispro, 2-7 Units, Subcutaneous, 4x Daily AC & at Bedtime  Lidocaine, 2 patch, Transdermal, Q24H  metoprolol tartrate, 12.5 mg, Oral, Q12H  mupirocin, , Each Nare, BID  pantoprazole, 40 mg, Intravenous, Once   Followed by  pantoprazole, 40 mg, Oral, QAM  senna-docusate sodium, 2 tablet, Oral, Nightly    Continuous Infusions:  amiodarone, 1 mg/min, Last Rate: 1 mg/min (24 0904)   Followed by  amiodarone, 0.5 mg/min  clevidipine, 2-32 mg/hr  DOPamine, 2-20 mcg/kg/min  EPINEPHrine, 0.02-0.1 mcg/kg/min  milrinone, 0.25-0.375 mcg/kg/min  niCARdipine, 5-15 mg/hr, Last Rate: Stopped (24 1200)  norepinephrine, 0.02-0.2 mcg/kg/min, Last Rate: Stopped (24 0830)  phenylephrine, 0.2-2 mcg/kg/min  propofol, 5-50 mcg/kg/min, Last Rate: Stopped (24 1245)  sodium chloride, 30 mL/hr, Last Rate: 30 mL/hr (24 1156)    PRN Meds:    acetaminophen **OR** acetaminophen **OR** acetaminophen    ALPRAZolam    bisacodyl    bisacodyl    clevidipine    cyclobenzaprine    dextrose    dextrose    DOPamine    EPINEPHrine    glucagon (human recombinant)    HYDROcodone-acetaminophen    magnesium hydroxide    midazolam    milrinone    Morphine **AND** naloxone    Morphine    niCARdipine    nitroglycerin    norepinephrine     ondansetron    oxyCODONE    phenylephrine    polyethylene glycol    polyethylene Glycol 400    Potassium Replacement - Follow Nurse / BPA Driven Protocol    propofol    simethicone         Objective:       Vitals:    02/29/24 2141 02/29/24 2340 03/01/24 0409 03/01/24 0750   BP:  112/56 97/66 96/68   BP Location:  Left arm Left arm Left arm   Patient Position:  Lying Lying Lying   Pulse: 87 87 96 (!) 140   Resp:  16 16 16   Temp:  98.7 °F (37.1 °C) 99.9 °F (37.7 °C) 99.8 °F (37.7 °C)   TempSrc:  Oral Oral Oral   SpO2:  95% 93% 92%   Weight:               Physical Exam:  Constitutional: Well appearing, well developed, no acute distress   HENT: Oropharynx clear and membrane moist  Eyes: Normal conjunctiva, no sclera icterus.  Neck: Supple, no carotid bruit bilaterally.  Cardiovascular: Irregularly irregular and Tachycardia rate and rhythm, No Murmur, No bilateral lower extremity edema.  Pulmonary: Normal respiratory effort, normal lung sounds, no wheezing.  Neurological: Alert and orient x 3.   Skin: Warm, dry, no ecchymosis, no rash.  Psych: Appropriate mood and affect. Normal judgment and insight.           Lab Review:   Results from last 7 days   Lab Units 03/01/24  0430 02/29/24  0320 02/28/24  1550 02/28/24  1125 02/26/24  0733   SODIUM mmol/L 141 142 145 142 141   POTASSIUM mmol/L 4.3 4.1 4.6 3.8 3.3*   CHLORIDE mmol/L 105 111* 112* 108* 106   CO2 mmol/L 22.4 20.2* 24.0 25.0 24.5   BUN mg/dL 30* 17 14 13 13   CREATININE mg/dL 1.27 1.18 1.14 1.25 1.04   GLUCOSE mg/dL 122* 137* 140* 141* 107*   CALCIUM mg/dL 8.2* 8.0* 8.6 8.7 8.9   AST (SGOT) U/L  --   --   --   --  16   ALT (SGPT) U/L  --   --   --   --  9         Results from last 7 days   Lab Units 03/01/24  0430 02/29/24  0320 02/28/24  1550 02/28/24  1125 02/28/24  1043 02/28/24  1042 02/28/24  0952 02/28/24  0800 02/26/24  0733   WBC 10*3/mm3 13.75* 8.62 8.72 11.65*  11.38*  --  11.30*  --   --  6.17   HEMOGLOBIN g/dL 9.9* 9.9* 11.0* 11.0*  10.8*  --  8.8*   --   --  12.5*   HEMOGLOBIN, POC g/dL  --   --   --   --  8.8*  --  8.8*   < >  --    HEMATOCRIT % 29.3* 29.3* 32.4* 32.9*  31.1*  --  26.6*  --   --  37.2*   HEMATOCRIT POC %  --   --   --   --  26*  --  26*   < >  --    PLATELETS 10*3/mm3 139* 115* 135* 139*  123*  --  120*  --   --  192    < > = values in this interval not displayed.     Results from last 7 days   Lab Units 02/29/24  0320 02/28/24  1125 02/28/24  1042 02/26/24  0733   INR  1.30* 1.42* 1.57*  1.4* 1.06   APTT seconds  --  30.5 33.1 29.5     Results from last 7 days   Lab Units 02/29/24  0320 02/28/24  1550 02/28/24  1125 02/26/24  0733   MAGNESIUM mg/dL 2.9* 2.5* 2.8* 2.2     Results from last 7 days   Lab Units 02/26/24  0733   CHOLESTEROL mg/dL 147   TRIGLYCERIDES mg/dL 60   HDL CHOL mg/dL 48     Results from last 7 days   Lab Units 02/26/24  0733   PROBNP pg/mL 133.0           Ascending aortic aneurysm and atrial clip 2/28/2024 Dr. Akhtar:  Ascending aortic aneurysm repair with a 32 mm Terumo Gelwave Dacron interposition graft   Proximal aortic arch replacement with the same graft in a hemiarch anastomosis configuration  Left atrial appendage obliteration with a 40 mm atrial clip device       Cardiac catheterization 2/5/2024:  Angiographically normal coronary arteries  LVEDP 10 mmHg     Echocardiogram 12/15/2023 [facility]:   Left Ventricle: Normal left ventricular systolic function with a   visually estimated EF of 55 - 60%. Left ventricle size is normal. Mildly   increased wall thickness.   Left Ventricle: Normal wall motion.   Aorta: Normal sized abdominal aorta. Mildly dilated aortic root. Ao   root diameter is 3.9 cm. Moderately dilated ascending aorta. Ao ascending   diameter is 5.3 cm, consistent with CTA of Chest (12/8/2023) and Previous   echo (5/8/2023). Descending Thoracic aorta measures 3.9 cm.   Left Ventricle: Grade I diastolic dysfunction.   Left Atrium: Left atrium is mildly dilated.   Aortic Valve: Mild  sclerosis of the aortic valve, left cusp.   Mitral Valve: Mild regurgitation.   Tricuspid Valve: Mildly elevated RVSP. The estimated RVSP is 35 mmHg.   Interatrial Septum: No interatrial shunt visualized with color Doppler.   Interatrial Septum: Atrial septal aneurysm present.   Aorta: Normal sized abdominal aorta. Mildly dilated aortic root. Ao root diameter is 3.9 cm. Moderately dilated ascending aorta. Ao ascending diameter is 5.3 cm, consistent with CTA of Chest (12/8/2023) and Previous echo (5/8/2023).Descending Thoracic aorta measures 3.9 cm.   Pericardium: No pericardial effusion.   Image quality is fair.              Assessment:          Diagnosis Plan   1. Aneurysm of ascending aorta without rupture  metoprolol tartrate (LOPRESSOR) tablet 12.5 mg    chlorhexidine (PERIDEX) 0.12 % solution 15 mL    ceFAZolin in dextrose (ANCEF) IVPB solution 2,000 mg    CBC (No Diff)    CBC (No Diff)    Fibrinogen    Fibrinogen    Protime-INR    Protime-INR    aPTT    aPTT    Tissue Pathology Exam    Tissue Pathology Exam             Plan:       Mr. Szymanski is a 74 y.o. gentleman with past medical history notable for ascending aortic aneurysm who presents for elective a ascending aortic aneurysm repair underwent surgery on 2/28/2024.  Overall he is making good recovery blood counts are stable after getting blood yesterday.  He did go into atrial fibrillation overnight this morning amiodarone is being started by primary team.        Ascending aortic aneurysm repair:  Making good steady recovery  Titrate beta-blocker      Anemia:  Continue to monitor and transfuse per CT surgery recommendation     Paroxysmal atrial fibrillation:  Amiodarone being started  Blood pressure is better continue to titrate beta-blocker  Anticoagulation recommendations per cardiac surgery he did have atrial appendage ligation           Lexx Tanner MD  [facility]  03/01/24  10:15 EST

## 2024-03-01 NOTE — PROGRESS NOTES
"Nutrition Services    Patient Name:  Nirav Szymanski  YOB: 1950  MRN: [id number]  Admit Date:  2/28/2024    Assessment Date:  03/01/24    NUTRITION SCREENING      Reason for Encounter NPO/Clear liquid x 3 days   Diagnosis/Problem Ascending aortic aneurysm- POD#2 s/p ascending aortic aneurysm repair, liver cyst, post-op anemia       PO Diet Diet: Liquid Diets; Clear Liquid; Fluid Consistency: Thin (IDDSI 0)   Supplements N/a   PO Intake % 25%       Medications MAR reviewed by RD   Labs  Listed below, reviewed   Physical Findings A/O, NC O2 @ 2L, obese   GI Function Normoactive BS, passing flatus, last BM 2/27   Skin Status Sternal incision       Height  Weight  BMI  Weight Trend     Height: 167.6 cm (66\")  Weight: 95.2 kg (209 lb 12.8 oz) (03/01/24 1100)  Body mass index is 33.86 kg/m².  Stable       Nutrition Problem (PES) Inadequate oral intake related to awaiting bowel fxn post-op as evidenced by NPO/CLDx3.       Intervention/Plan Advance diet as tolerates to Healthy Heart Diet.     RD to follow up per protocol.     Results from last 7 days   Lab Units 03/01/24  0430 02/29/24  0320 02/28/24  1550 02/28/24  1125 02/26/24  0733   SODIUM mmol/L 141 142 145   < > 141   POTASSIUM mmol/L 4.3 4.1 4.6   < > 3.3*   CHLORIDE mmol/L 105 111* 112*   < > 106   CO2 mmol/L 22.4 20.2* 24.0   < > 24.5   BUN mg/dL 30* 17 14   < > 13   CREATININE mg/dL 1.27 1.18 1.14   < > 1.04   CALCIUM mg/dL 8.2* 8.0* 8.6   < > 8.9   BILIRUBIN mg/dL  --   --   --   --  0.6   ALK PHOS U/L  --   --   --   --  67   ALT (SGPT) U/L  --   --   --   --  9   AST (SGOT) U/L  --   --   --   --  16   GLUCOSE mg/dL 122* 137* 140*   < > 107*    < > = values in this interval not displayed.     Results from last 7 days   Lab Units 03/01/24  0430 02/29/24  0320 02/28/24  1550 02/28/24  1125 02/28/24  0800 02/26/24  0733   MAGNESIUM mg/dL  --  2.9* 2.5* 2.8*  --  2.2   PHOSPHORUS mg/dL  --  4.4 2.5 1.0*   < >  --    HEMOGLOBIN g/dL 9.9* 9.9* 11.0* " 11.0*  10.8*   < > 12.5*   HEMOGLOBIN, POC   --   --   --   --    < >  --    HEMATOCRIT % 29.3* 29.3* 32.4* 32.9*  31.1*   < > 37.2*   HEMATOCRIT POC   --   --   --   --    < >  --    TRIGLYCERIDES mg/dL  --   --   --   --   --  60    < > = values in this interval not displayed.     Lab Results   Component Value Date    HGBA1C 4.90 02/26/2024         Electronically signed by:  Lydia Day, RIAT  03/01/24 14:58 EST

## 2024-03-01 NOTE — DISCHARGE PLACEMENT REQUEST
"Nirav Szymanski (74 y.o. Male)       Date of Birth   1950    Social Security Number       Address   [address] [address]    Home Phone   [phone]    MRN   [id number]       [employer]   [employer]    Marital Status                               Admission Date   2/28/24    Admission Type   Elective    Admitting Provider   John Akhtar MD    Attending Provider   John Akhtar MD    Department, Room/Bed   [facility] CARDIOVASC UNIT, 2210/1       Discharge Date       Discharge Disposition       Discharge Destination                                 Attending Provider: John Akhtar MD    Allergies: Famotidine, Ranitidine Hcl    Isolation: None   Infection: None   Code Status: CPR    Ht: 167.6 cm (66\")   Wt: 95.2 kg (209 lb 12.8 oz)    Admission Cmt: None   Principal Problem: Aneurysm of ascending aorta [I71.21]                   Active Insurance as of 2/28/2024       Primary Coverage       Payor Plan Insurance Group Employer/Plan Group    HUMA[employer] MEDICARE REPLACEMENT HU[employer] MED ADV GROUP [id number]       Payor Plan Address Payor Plan Phone Number Payor Plan Fax Number Effective Dates    [address] [address] [phone]  1/1/2024 - None Entered    [facility] [address]         Subscriber Name Subscriber Birth Date Member ID       NIRAV SZYMANSKI 1950 [id number]                     Emergency Contacts        (Rel.) Home Phone Work Phone Mobile Phone    JUAN SZYMANSKI (Spouse) [phone]                "

## 2024-03-01 NOTE — PLAN OF CARE
Goal Outcome Evaluation:  Plan of Care Reviewed With: patient        Progress: improving  Outcome Evaluation: Pt received in bed and agreeable to PT. Pt required min A to complete supine to sit with cues provided for technique. Pt stood and took a few steps to chair c HHA requiring CGA. Overall distance limited due to new onset of Afib with HR elevated to 141bpm with minimal activity. Pt UIC and completed cardiac protocol x 10. PT encouraged pt to sit UIC and mobilize with staff as tolerated. Pt will continue to benefit from skilled PT to address strength, endurance, and functional mobility.      Anticipated Discharge Disposition (PT): home with assist, home with outpatient therapy services (cardiac rehab)

## 2024-03-01 NOTE — PLAN OF CARE
Goal Outcome Evaluation:                 Patient s/p AAA, POD2.  Patient in A-fib 130s-150s upon receiving patient at start of shift. Alerted ADELINA Alonso, who ordered calcium glucnate, mag sulfate, and amio bolus and drip. Amio drip currently running at 0.5mg/min. Patient VSS, Afib 80s, on 2L n/c, BP 90s-100s. Given norco for c/o pain. Masters removed. Patient pacer off. Safety precautions in place, call light within reach. Care ongoing.

## 2024-03-01 NOTE — CONSULTS
Referral received for Phase II Cardiac Rehab.  Staff has reviewed chart and patient does not have a qualifying diagnosis for Phase II Cardiac Rehab at this time.  Staff available if further consultation is needed.

## 2024-03-01 NOTE — OP NOTE
Operative Note    Date of Dictation: 03/01/24    Date of Procedure: 02/28/2024    Referring Physician: Suhail Bojorquez MD    Preoperative diagnosis:  1.  5.3 cm ascending aortic aneurysm  2.  Proximal arch aneurysmal dilatation  3.  Hypertension    Postoperative diagnosis:  Same    Procedure:   1. Ascending aortic aneurysm repair with a 32 mm Terumo Gelwave Dacron interposition graft (CPT code 16296)  2.  Proximal aortic arch replacement with the same graft in a hemiarch anastomosis configuration (CPT code 78681)  3.  Comprehensive neuro monitoring  4.  Left atrial appendage obliteration with a 40 mm atrial clip device (CPT code 24458)    Surgeon: John Akhtar MD     Assistants: Assistant: Denise Dillard CSA was responsible for performing the following activities: Retraction, Suction, Irrigation, Suturing, Closing, Placing Dressing, and all aspects of the complex cardiac case  and their skilled assistance was necessary for the success of this case.    Anesthesia: General endotracheal anesthesia and CELESTINE    Findings:  Large ascending aortic aneurysm but with preservation of the sinotubular junction and moderate enlargement Of the proximal aortic arch.  There was normal arch branching configuration.  In my opinion, hemiarch anastomosis was required using circulatory arrest to complete the distal repair    Estimated Blood Loss: Approximately 500 cc, most of it recovered with the Cell Saver device and cardiotomy suckers and was retransfuse to the patient    STS Data:  The patient was explained the risks and benefits and alternatives of surgery and agreed to proceed.  The antibiotics and the beta-blockers were given within the STS required window.  Counseling was given about diet, alcohol and tobacco use as needed    Description of the procedure:     The patient was placed supine on the operative table. Anesthesia was given and lines placed. The patient was prepped and draped using the usual sterile technique. A  median sternotomy was performed with a scalpel and the layers carried down to the sternum using the electrocautery. The sternum was split in the midline using a vertical oscillating saw. A Favaloro retractor was placed and anterior mediastinal was exposed the pericardium was opened and the edges were tacked to the wound.  I dissected in the distal ascending aorta and proximal aortic arch and encircle each arch vessel with a vessel loop individually.  There was enlargement to the level of the left subclavian artery.  The aortic root had a mild dilatation but was not aneurysmal.  The CELESTINE showed the aortic valve trileaflet and without significant aortic insufficiency.  Hemostasis was achieved. 300 units of IV heparin was given to maintain the ACT over 400.  Cannulation sutures were placed in the distal aortic arch and right atrium. Cardiopulmonary bypass was started and cardioplegia cannulas were placed and then systemic cooling was progressively achieved to a lowest temperature of 23.8 °C nasopharyngeal.  The distal ascending aorta was clamped.  One liter of cold blood cardioplegia was given in an antegrade fashion to achieve diastolic arrest and further doses every 15 minutes thereafter also using retrograde infusion and coronary ostia cannulas.  The heart was retracted to the right and the base of the left atrial appendage was exposed.  I measured the base of the left atrial appendage and selected a 40 mm atrial clip device which was deployed at the base of the appendage without difficulty and with good obliteration.    I transected the aorta in the midportion and inspected the aortic root.  I trimmed the aorta to the level of the sinotubular junction which had diameter of 32 mm.  I selected a 32 mm dacron graft.  I inspected the valve and there was no leaflet pathology or calcification.  The distance at the level of the sinuses was approximately 40 mm.  Once the EEG was flat the patient was placed in steep  Trendelenburg and 100 mg of propofol were given intravenously for suppression.  I performed the circulatory arrest by clamping the cannula in the aortic arch and draining the venous.  I removed the clamp and suction the blood from the field and the aorta.  I resected the aorta aggressively in the undersurface of the aortic arch from the innominate artery to the level of the left subclavian artery.  I placed perfusion catheters in the innominate artery and the left carotid with good response and perfusing approximate 1.5 L/min of cold blood.  There was good response with a flat EEG and supranormal brain sats bilaterally.  I prepared the distal end of the dacryon graft and a complete anastomosis between the aortic arch cuff and the graft using a 4-0 Prolene continuous suture intussusceptum the graft in the aorta.  I removed the cannulas perfusing the brain and then complete anastomosis.  I tensed the suture line with a nerve hook and then tied down.  I placed a few repair sutures and then commence perfusion through the aortic cannula to remove air and debris from the aorta and graft and then place a clamp distally in the graft.  Cold systemic perfusion was used for 5 minutes and then progressive rewarming.  I measured the graft to the proximal aortic cuff and completed anastomosis using a 4-0 Prolene continuous suture in the same fashion previously described.  I placed a few repair sutures and also place an needed graft cardioplegia cannula and vent.  I gave the warm dose of cardioplegia and then completion I remove the aortic clamp with tip suction on the aortic graft vent.  While the patient was rewarming I placed a few repair sutures.    The left pleural space was suctioned and the lungs ventilated. The heart was paced till regular atrial rhythm resumed. I allowed the heart to eject  and I de-aired the left heart chambers under CELESTINE guidance and once hemodynamics were acceptable, then the CPB was discontinued and  the venous and cardioplegia cannulas removed. The matching dose of protamine was given and the aortic cannula removed as well. AV temporary wires and pleural and mediastinal chest tubes were placed and the wound sprayed with platelet rich plasma.  Given the patient's thrombocytopenia before surgery and the hypothermia and bypass time I gave platelets and cryoprecipitate to reverse the intraoperative coagulopathy.   The perioperative CELESTINE showed the aortic valve without leakage and good biventricular contractility and no air at the end of the bypass run. The sternum was closed with single and double wires and soft tissue in layers of reabsorbable material. The wounds were covered with sterile dressings.    Specimen removed: Proximal thoracic aorta tissue    CPB time: 100 minutes    Aortic clamp time: 61 minutes    Lowest body temperature: 23.8 °C nasopharyngeal    Circulatory arrest: Total of 17 minutes with 9 minutes of bilateral antegrade cerebral perfusion       Complications:  none           Disposition: Cardiovascular recovery room           Condition: Critical but stable.    John Akhtar M.D.

## 2024-03-01 NOTE — THERAPY TREATMENT NOTE
Patient Name: Nirav Szymanski  : 1950    MRN: [id number]                              Today's Date: 3/1/2024       Admit Date: 2024    Visit Dx:     ICD-10-CM ICD-9-CM   1. Aneurysm of ascending aorta without rupture  I71.21 441.2     Patient Active Problem List   Diagnosis    Right flank pain    Vitamin D deficiency    Vasomotor rhinitis    Spinal stenosis of lumbar region    Liver cyst    History of temporal arteritis    GERD (gastroesophageal reflux disease)    DDD (degenerative disc disease), lumbar    Aneurysm of ascending aorta    Allergic rhinitis    Adenomatous polyp of colon    Hernia of abdominal wall    Abnormal findings on diagnostic imaging of other specified body structures    Ascending aortic aneurysm     Past Medical History:   Diagnosis Date    Ascending aortic aneurysm     5.3 PER CARDIOLOGY NOTE    Colon polyps     GERD (gastroesophageal reflux disease)     History of prostate cancer     PONV (postoperative nausea and vomiting)      Past Surgical History:   Procedure Laterality Date    ASCENDING AORTIC ANEURYSM REPAIR W/ MECHANICAL AORTIC VALVE REPLACEMENT N/A 2024    Procedure: CELESTINE, STERNOTOMY, THORACIC AORTIC ANEURYSM REPAIR, CIRCULATORY ARREST, NEURO MONITORING, PRP;  Surgeon: John Akhtar MD;  Location: [facility] CVOR;  Service: Cardiothoracic;  Laterality: N/A;    CARDIAC CATHETERIZATION N/A 2024    Procedure: Left Heart Cath;  Surgeon: Suhail Bojorquez MD;  Location: [facility] CATH INVASIVE LOCATION;  Service: Cardiology;  Laterality: N/A;    CARDIAC CATHETERIZATION N/A 2024    Procedure: Coronary angiography;  Surgeon: Suhail Bojorquez MD;  Location: [facility] CATH INVASIVE LOCATION;  Service: Cardiology;  Laterality: N/A;    COLONOSCOPY      COLONOSCOPY N/A 2024    Procedure: COLONOSCOPY to cecum and into the terminal ileum with cold snare polypectomies;  Surgeon: Lexx Hill MD;  Location: [facility] ENDOSCOPY;  Service: Gastroenterology;  Laterality:  N/A;  Pre: screening, hx polyps  Post: external hemorrhoids, polyps, tattoo, diverticulosis    INGUINAL HERNIA REPAIR Right     NASAL POLYP EXCISION      PROSTATECTOMY  2004    ROTATOR CUFF REPAIR Left     2005    SINUS SURGERY      TESTICLE SURGERY      CHILDHOOD      General Information       Row Name 03/01/24 1140          Physical Therapy Time and Intention    Document Type therapy note (daily note)  -CS     Mode of Treatment individual therapy;physical therapy  -       Row Name 03/01/24 1140          General Information    Patient Profile Reviewed yes  -CS     Existing Precautions/Restrictions cardiac;sternal;fall  -CS       Row Name 03/01/24 1140          Cognition    Orientation Status (Cognition) oriented x 3  -CS       Row Name 03/01/24 1140          Safety Issues, Functional Mobility    Impairments Affecting Function (Mobility) endurance/activity tolerance;shortness of breath;strength;pain  -CS               User Key  (r) = Recorded By, (t) = Taken By, (c) = Cosigned By      Initials Name Provider Type    CS Clover Quiroz, PT Physical Therapist                   Mobility       Row Name 03/01/24 1141          Bed Mobility    Bed Mobility supine-sit  -CS     Supine-Sit Dun[place] (Bed Mobility) minimum assist (75% patient effort);verbal cues;nonverbal cues (demo/gesture)  -     Assistive Device (Bed Mobility) bed rails;head of bed elevated  -CS     Comment, (Bed Mobility) cues for bed mobility; UIC at end of session  -CS       Row Name 03/01/24 1141          Sit-Stand Transfer    Sit-Stand Dunbar (Transfers) contact guard;verbal cues  -       Row Name 03/01/24 1141          Gait/Stairs (Locomotion)    Dunbar Level (Gait) minimum assist (75% patient effort);1 person assist;1 person to manage equipment;verbal cues  -CS     Assistive Device (Gait) other (see comments)  HHA  -CS     Distance in Feet (Gait) 2' + 4'  -CS     Deviations/Abnormal Patterns (Gait) irene decreased;gait speed  decreased;stride length decreased  -CS     Comment, (Gait/Stairs) distance limited due to HR - elevated to 141bpm with minimal activity  -CS               User Key  (r) = Recorded By, (t) = Taken By, (c) = Cosigned By      Initials Name Provider Type    CS Clover Quiroz, PT Physical Therapist                   Obj/Interventions       Row Name 03/01/24 1319          Motor Skills    Therapeutic Exercise other (see comments)  10 reps cardiac protocol  -       Row Name 03/01/24 1319          Balance    Balance Assessment sitting static balance;sitting dynamic balance;standing static balance;standing dynamic balance  -     Static Sitting Balance supervision  -CS     Dynamic Sitting Balance standby assist  -CS     Position, Sitting Balance unsupported;sitting edge of bed  -CS     Static Standing Balance standby assist  -CS     Dynamic Standing Balance contact guard  -CS     Position/Device Used, Standing Balance supported;other (see comments)  HHA  -CS               User Key  (r) = Recorded By, (t) = Taken By, (c) = Cosigned By      Initials Name Provider Type    Clover Barragan, PT Physical Therapist                   Goals/Plan    No documentation.                  Clinical Impression       Row Name 03/01/24 1320          Plan of Care Review    Plan of Care Reviewed With patient  -CS     Progress improving  -     Outcome Evaluation Pt received in bed and agreeable to PT. Pt required min A to complete supine to sit with cues provided for technique. Pt stood and took a few steps to chair c HHA requiring CGA. Overall distance limited due to new onset of Afib with HR elevated to 141bpm with minimal activity. Pt UIC and completed cardiac protocol x 10. PT encouraged pt to sit UIC and mobilize with staff as tolerated. Pt will continue to benefit from skilled PT to address strength, endurance, and functional mobility.  -       Row Name 03/01/24 1323          Therapy Assessment/Plan (PT)    Criteria for Skilled  Interventions Met (PT) yes;meets criteria  -CS     Therapy Frequency (PT) daily  -CS       Row Name 03/01/24 1320          Vital Signs    Pretreatment Heart Rate (beats/min) 108  -CS     Intratreatment Heart Rate (beats/min) 141  -CS     Posttreatment Heart Rate (beats/min) 123  -CS     Pre SpO2 (%) 94  -CS     O2 Delivery Pre Treatment supplemental O2  -CS     O2 Delivery Intra Treatment supplemental O2  -CS     Post SpO2 (%) 92  -CS       Row Name 03/01/24 1320          Positioning and Restraints    Pre-Treatment Position in bed  -CS     Post Treatment Position chair  -CS     In Chair reclined;call light within reach;encouraged to call for assist;exit alarm on;RUE elevated;LUE elevated;waffle cushion;heels elevated  -CS               User Key  (r) = Recorded By, (t) = Taken By, (c) = Cosigned By      Initials Name Provider Type    Clover Barragan PT Physical Therapist                   Outcome Measures       Row Name 03/01/24 1325          How much help from another person do you currently need...    Turning from your back to your side while in flat bed without using bedrails? 3  -CS     Moving from lying on back to sitting on the side of a flat bed without bedrails? 3  -CS     Moving to and from a bed to a chair (including a wheelchair)? 3  -CS     Standing up from a chair using your arms (e.g., wheelchair, bedside chair)? 3  -CS     Climbing 3-5 steps with a railing? 2  -CS     To walk in hospital room? 3  -CS     AM-PAC 6 Clicks Score (PT) 17  -CS     Highest Level of Mobility Goal 5 --> Static standing  -CS       Row Name 03/01/24 1325          Functional Assessment    Outcome Measure Options AM-PAC 6 Clicks Basic Mobility (PT)  -CS               User Key  (r) = Recorded By, (t) = Taken By, (c) = Cosigned By      Initials Name Provider Type    Clover Barragan PT Physical Therapist                                 Physical Therapy Education       Title: PT OT SLP Therapies (Done)       Topic: Physical  Therapy (Done)       Point: Mobility training (Done)       Learning Progress Summary             Patient Acceptance, E,TB, VU,DU,NR by  at 3/1/2024 1325    Acceptance, E,D, DU by PC at 2/29/2024 1039                         Point: Home exercise program (Done)       Learning Progress Summary             Patient Acceptance, E,TB, VU,DU,NR by CS at 3/1/2024 1325    Acceptance, E,D, DU by PC at 2/29/2024 1039                         Point: Body mechanics (Done)       Learning Progress Summary             Patient Acceptance, E,TB, VU,DU,NR by CS at 3/1/2024 1325    Acceptance, E,D, DU by PC at 2/29/2024 1039                         Point: Precautions (Done)       Learning Progress Summary             Patient Acceptance, E,TB, VU,DU,NR by  at 3/1/2024 1325    Acceptance, E,D, DU by PC at 2/29/2024 1039                                         User Key       Initials Effective Dates Name Provider Type Discipline    PC 06/16/21 -  Makayla Mac PT Physical Therapist PT     09/22/22 -  Clover Quiroz PT Physical Therapist PT                  PT Recommendation and Plan     Plan of Care Reviewed With: patient  Progress: improving  Outcome Evaluation: Pt received in bed and agreeable to PT. Pt required min A to complete supine to sit with cues provided for technique. Pt stood and took a few steps to chair c HHA requiring CGA. Overall distance limited due to new onset of Afib with HR elevated to 141bpm with minimal activity. Pt UIC and completed cardiac protocol x 10. PT encouraged pt to sit UIC and mobilize with staff as tolerated. Pt will continue to benefit from skilled PT to address strength, endurance, and functional mobility.     Time Calculation:         PT Charges       Row Name 03/01/24 1325             Time Calculation    Start Time 1101  -      Stop Time 1119  -      Time Calculation (min) 18 min  -      PT Received On 03/01/24  -      PT - Next Appointment 03/02/24  -         Time Calculation- PT     Total Timed Code Minutes- PT 15 minute(s)  -CS         Timed Charges    35454 - PT Therapeutic Activity Minutes 15  -CS         Total Minutes    Timed Charges Total Minutes 15  -CS       Total Minutes 15  -CS                User Key  (r) = Recorded By, (t) = Taken By, (c) = Cosigned By      Initials Name Provider Type    CS Clover Quiroz, PT Physical Therapist                  Therapy Charges for Today       Code Description Service Date Service Provider Modifiers Qty    [id number] HC PT THERAPEUTIC ACT EA 15 MIN 3/1/2024 Clover Quiroz, PT GP 1            PT G-Codes  Outcome Measure Options: AM-PAC 6 Clicks Basic Mobility (PT)  AM-PAC 6 Clicks Score (PT): 17  PT Discharge Summary  Anticipated Discharge Disposition (PT): home with assist, home with outpatient therapy services (cardiac rehab)    Clover Quiroz PT  3/1/2024

## 2024-03-02 ENCOUNTER — APPOINTMENT (OUTPATIENT)
Dept: GENERAL RADIOLOGY | Facility: HOSPITAL | Age: 74
DRG: 221 | End: 2024-03-02
Payer: MEDICARE

## 2024-03-02 LAB
ANION GAP SERPL CALCULATED.3IONS-SCNC: 10 MMOL/L (ref 5–15)
BUN SERPL-MCNC: 37 MG/DL (ref 8–23)
BUN/CREAT SERPL: 32.2 (ref 7–25)
CALCIUM SPEC-SCNC: 8.3 MG/DL (ref 8.6–10.5)
CHLORIDE SERPL-SCNC: 102 MMOL/L (ref 98–107)
CO2 SERPL-SCNC: 25 MMOL/L (ref 22–29)
CREAT SERPL-MCNC: 1.15 MG/DL (ref 0.76–1.27)
DEPRECATED RDW RBC AUTO: 42.2 FL (ref 37–54)
EGFRCR SERPLBLD CKD-EPI 2021: 66 ML/MIN/1.73
ERYTHROCYTE [DISTWIDTH] IN BLOOD BY AUTOMATED COUNT: 12.7 % (ref 12.3–15.4)
GLUCOSE BLDC GLUCOMTR-MCNC: 111 MG/DL (ref 70–130)
GLUCOSE BLDC GLUCOMTR-MCNC: 113 MG/DL (ref 70–130)
GLUCOSE BLDC GLUCOMTR-MCNC: 114 MG/DL (ref 70–130)
GLUCOSE BLDC GLUCOMTR-MCNC: 114 MG/DL (ref 70–130)
GLUCOSE SERPL-MCNC: 109 MG/DL (ref 65–99)
HCT VFR BLD AUTO: 28.7 % (ref 37.5–51)
HGB BLD-MCNC: 9.4 G/DL (ref 13–17.7)
MCH RBC QN AUTO: 30.1 PG (ref 26.6–33)
MCHC RBC AUTO-ENTMCNC: 32.8 G/DL (ref 31.5–35.7)
MCV RBC AUTO: 92 FL (ref 79–97)
PLATELET # BLD AUTO: 121 10*3/MM3 (ref 140–450)
PMV BLD AUTO: 10.3 FL (ref 6–12)
POTASSIUM SERPL-SCNC: 4.1 MMOL/L (ref 3.5–5.2)
RBC # BLD AUTO: 3.12 10*6/MM3 (ref 4.14–5.8)
SODIUM SERPL-SCNC: 137 MMOL/L (ref 136–145)
WBC NRBC COR # BLD AUTO: 11.37 10*3/MM3 (ref 3.4–10.8)

## 2024-03-02 PROCEDURE — 82948 REAGENT STRIP/BLOOD GLUCOSE: CPT

## 2024-03-02 PROCEDURE — 25010000002 ENOXAPARIN PER 10 MG: Performed by: NURSE PRACTITIONER

## 2024-03-02 PROCEDURE — 94799 UNLISTED PULMONARY SVC/PX: CPT

## 2024-03-02 PROCEDURE — 71046 X-RAY EXAM CHEST 2 VIEWS: CPT

## 2024-03-02 PROCEDURE — 99232 SBSQ HOSP IP/OBS MODERATE 35: CPT | Performed by: PHYSICIAN ASSISTANT

## 2024-03-02 PROCEDURE — 97530 THERAPEUTIC ACTIVITIES: CPT

## 2024-03-02 PROCEDURE — 80048 BASIC METABOLIC PNL TOTAL CA: CPT | Performed by: NURSE PRACTITIONER

## 2024-03-02 PROCEDURE — 25010000002 AMIODARONE IN DEXTROSE 5% 360-4.14 MG/200ML-% SOLUTION: Performed by: NURSE PRACTITIONER

## 2024-03-02 PROCEDURE — 85027 COMPLETE CBC AUTOMATED: CPT | Performed by: NURSE PRACTITIONER

## 2024-03-02 PROCEDURE — 71045 X-RAY EXAM CHEST 1 VIEW: CPT

## 2024-03-02 RX ORDER — AMIODARONE HYDROCHLORIDE 200 MG/1
400 TABLET ORAL EVERY 12 HOURS SCHEDULED
Status: DISCONTINUED | OUTPATIENT
Start: 2024-03-02 | End: 2024-03-04

## 2024-03-02 RX ADMIN — DOCUSATE SODIUM 50MG AND SENNOSIDES 8.6MG 2 TABLET: 8.6; 5 TABLET, FILM COATED ORAL at 20:36

## 2024-03-02 RX ADMIN — HYDROCODONE BITARTRATE AND ACETAMINOPHEN 2 TABLET: 5; 325 TABLET ORAL at 21:24

## 2024-03-02 RX ADMIN — PANTOPRAZOLE SODIUM 40 MG: 40 TABLET, DELAYED RELEASE ORAL at 06:20

## 2024-03-02 RX ADMIN — GUAIFENESIN 1200 MG: 600 TABLET, EXTENDED RELEASE ORAL at 20:35

## 2024-03-02 RX ADMIN — HYDROCODONE BITARTRATE AND ACETAMINOPHEN 2 TABLET: 5; 325 TABLET ORAL at 15:24

## 2024-03-02 RX ADMIN — ATORVASTATIN CALCIUM 40 MG: 20 TABLET, FILM COATED ORAL at 20:35

## 2024-03-02 RX ADMIN — AMIODARONE HYDROCHLORIDE 400 MG: 200 TABLET ORAL at 20:35

## 2024-03-02 RX ADMIN — AMIODARONE HYDROCHLORIDE 0.5 MG/MIN: 1.8 INJECTION, SOLUTION INTRAVENOUS at 03:49

## 2024-03-02 RX ADMIN — GUAIFENESIN 1200 MG: 600 TABLET, EXTENDED RELEASE ORAL at 12:08

## 2024-03-02 RX ADMIN — ENOXAPARIN SODIUM 40 MG: 100 INJECTION SUBCUTANEOUS at 18:48

## 2024-03-02 RX ADMIN — ATENOLOL 25 MG: 25 TABLET ORAL at 20:35

## 2024-03-02 RX ADMIN — ATENOLOL 25 MG: 25 TABLET ORAL at 10:42

## 2024-03-02 RX ADMIN — FUROSEMIDE 40 MG: 40 TABLET ORAL at 10:42

## 2024-03-02 RX ADMIN — HYDROCODONE BITARTRATE AND ACETAMINOPHEN 2 TABLET: 5; 325 TABLET ORAL at 06:23

## 2024-03-02 RX ADMIN — MUPIROCIN 1 APPLICATION: 20 OINTMENT TOPICAL at 20:37

## 2024-03-02 RX ADMIN — GABAPENTIN 100 MG: 100 CAPSULE ORAL at 20:37

## 2024-03-02 RX ADMIN — AMIODARONE HYDROCHLORIDE 400 MG: 200 TABLET ORAL at 12:15

## 2024-03-02 RX ADMIN — 0.12% CHLORHEXIDINE GLUCONATE 15 ML: 1.2 RINSE ORAL at 06:23

## 2024-03-02 RX ADMIN — 0.12% CHLORHEXIDINE GLUCONATE 15 ML: 1.2 RINSE ORAL at 20:37

## 2024-03-02 RX ADMIN — GABAPENTIN 100 MG: 100 CAPSULE ORAL at 10:43

## 2024-03-02 RX ADMIN — ASPIRIN 81 MG: 81 TABLET, COATED ORAL at 10:43

## 2024-03-02 RX ADMIN — MUPIROCIN 1 APPLICATION: 20 OINTMENT TOPICAL at 10:43

## 2024-03-02 RX ADMIN — 0.12% CHLORHEXIDINE GLUCONATE 15 ML: 1.2 RINSE ORAL at 12:15

## 2024-03-02 RX ADMIN — POTASSIUM CHLORIDE 20 MEQ: 750 TABLET, EXTENDED RELEASE ORAL at 10:42

## 2024-03-02 RX ADMIN — LIDOCAINE 2 PATCH: 4 PATCH TOPICAL at 10:43

## 2024-03-02 NOTE — PROGRESS NOTES
Patient Name: Nirav Szymanski  Age/Sex: 74 y.o. male  : 1950  MRN: [id number]    Date of Admission: 2024  Date of Encounter Visit: 24  Encounter Provider: Boston Wei PA-C  Place of Service: [facility] CARDIOLOGY      Subjective:       Chief Complaint: Postop, aortic aneurysm repair     History of Present Illness:  Nirav Szymanski is a 74 y.o. male who underwent a aortic aneurysm repair with 32 mm Terumo Gelwave Dacron interposition graft on 2024.  During that time, he also had a left atrial appendage clip placed and a proximal aortic arch graft placed.    He developed postoperative atrial fibrillation on 3/1/2024.  He has been on IV amiodarone since.    PMH: Ascending aortic aneurysm s/p repair    Follow up:   24 Blood pressure has been soft overnight into the 90s systolic.  Currently on 2 L nasal cannula.  1.5 L out overnight.  On IV amiodarone 0.5 mcg.  Maintaining sinus rhythm.  Would recommend transitioning this to oral amiodarone.    Previous testing:   Echo 12/15/2023: EF of 55 to 60% with normal LV size.  Aortic root 3.9 cm with ascending aorta measuring 5.3 cm.  Grade 1 diastolic dysfunction.  Mildly dilated left atrium.  Mild MR and TR.  RVSP 35 mmHg.    Heart Cath 2024: Normal coronary arteries with no significant coronary stenosis    Home Medications:   Medications Prior to Admission   Medication Sig Dispense Refill Last Dose    chlorhexidine (PERIDEX) 0.12 % solution Apply 15 mL to the mouth or throat Take As Directed. PRIOR TO OR   2024 at 0420    mupirocin (BACTROBAN) 2 % nasal ointment into the nostril(s) as directed by provider Take As Directed. PRIOR TO OR   2024 at 0420    aspirin 81 MG EC tablet Take 1 tablet by mouth Daily. HELD FOR OR   2024    B Complex Vitamins (VITAMIN B COMPLEX PO) Take 1 tablet by mouth Daily. HELD FOR OR   2024    docusate sodium (COLACE) 250 MG capsule Take 1 capsule by mouth  Daily.   2/17/2024    famotidine (PEPCID) 20 MG tablet Take 1 tablet by mouth 2 (Two) Times a Day.   2/17/2024    fexofenadine (ALLEGRA) 180 MG tablet Take 1 tablet by mouth Every Night.   2/17/2024    ipratropium (ATROVENT) 0.03 % nasal spray 2 sprays into the nostril(s) as directed by provider As Needed for Rhinitis.       Magnesium 400 MG tablet Take 400 mg by mouth Daily. HELD FOR OR       Turmeric (QC TUMERIC COMPLEX PO) Take 1 tablet by mouth Daily. HELD FOR OR   2/17/2024    vitamin D3 125 MCG (5000 UT) capsule capsule Take 1 capsule by mouth Daily.   2/17/2024       Allergies:  Allergies   Allergen Reactions    Famotidine Swelling and Rash    Ranitidine Hcl Swelling         Review of Systems:  All systems reviewed. Pertinent positives identified in HPI. All other systems are negative.       Objective:     Objective:  Temp:  [97.7 °F (36.5 °C)-99.7 °F (37.6 °C)] 97.9 °F (36.6 °C)  Heart Rate:  [] 63  Resp:  [16] 16  BP: ()/(49-72) 99/49    Intake/Output Summary (Last 24 hours) at 3/2/2024 0834  Last data filed at 3/2/2024 0804  Gross per 24 hour   Intake 530 ml   Output 1510 ml   Net -980 ml     Body mass index is 34.28 kg/m².      02/29/24  0600 03/01/24  1100 03/02/24  0627   Weight: 95.7 kg (211 lb) 95.2 kg (209 lb 12.8 oz) 96.3 kg (212 lb 6.4 oz)         Physical Exam:   General: 74 y.o. male No acute distress, laying in bed. Alert and Oriented.   Neck: No JVD or carotid bruit  Lungs: Clear to ausculation bilaterally, symmetric  Heart: Regular rate and rhythm with no overt murmurs, rubs or gallops. Normal S1 and S2.   Abdomen: soft, non-tender  Extremities: No lower extremity edema or cyanosis.   Neuo: no lateralizing defects.     Lab Review:     Results from last 7 days   Lab Units 03/02/24  0353 03/01/24  0430 02/29/24  0320   SODIUM mmol/L 137 141 142   POTASSIUM mmol/L 4.1 4.3 4.1   CHLORIDE mmol/L 102 105 111*   CO2 mmol/L 25.0 22.4 20.2*   BUN mg/dL 37* 30* 17   CREATININE mg/dL 1.15  1.27 1.18   GLUCOSE mg/dL 109* 122* 137*   CALCIUM mg/dL 8.3* 8.2* 8.0*           Results from last 7 days   Lab Units 03/02/24  0353   WBC 10*3/mm3 11.37*   HEMOGLOBIN g/dL 9.4*   HEMATOCRIT % 28.7*   PLATELETS 10*3/mm3 121*     Results from last 7 days   Lab Units 02/29/24  0320 02/28/24  1125 02/28/24  1042 02/26/24  0733   INR  1.30* 1.42* 1.57*  1.4* 1.06   APTT seconds  --  30.5 33.1 29.5     Results from last 7 days   Lab Units 02/26/24  0733   CHOLESTEROL mg/dL 147     Results from last 7 days   Lab Units 02/29/24  0320   MAGNESIUM mg/dL 2.9*     Results from last 7 days   Lab Units 02/26/24  0733   CHOLESTEROL mg/dL 147   TRIGLYCERIDES mg/dL 60   HDL CHOL mg/dL 48   LDL CHOL mg/dL 87     Results from last 7 days   Lab Units 02/26/24  0733   PROBNP pg/mL 133.0               Imaging:    chest X-ray    Results for orders placed in visit on 02/28/24    Diagnostic IntraOp Orestes    Narrative  Diagnostic IntraOp Orestes    Procedure Performed: Diagnostic IntraOp Orestes  Start Time:  2/28/2024 7:45 AM  End Time:   2/28/2024 7:58 AM    Preanesthesia Checklist:  Patient identified, IV assessed, risks and benefits discussed, monitors and equipment assessed, procedure being performed at surgeon's request and anesthesia consent obtained.    General Procedure Information  Diagnostic Indications for Echo:  assessment of ascending aorta, assessment of surgical repair and hemodynamic monitoring  Physician Requesting Echo: John Akhtar MD  CPT Code:  38443, 76136  ICD Code for Medical Necessity:  I70.0, I 34.0  Location performed:  OR  Intubated  Bite block placed  Heart visualized  Probe Insertion:  Easy  Probe Type:  Multiplane  Modalities:  Color flow mapping, pulse wave Doppler and continuous wave Doppler    Echocardiographic and Doppler Measurements    Ventricles    Right Ventricle:  Thrombus not present.  Global function normal.  Left Ventricle:  Thrombus not present.  Global Function normal.  Ejection Fraction  65%.        Valves    Aortic Valve:  Annulus dilated.  Stenosis not present.  Area: 3.09 cm².  Mean Gradient: 2 mmHg.  Regurgitation trace.  Leaflets normal.  Leaflet motions normal.    Mitral Valve:  Stenosis not present.  Mean Gradient: 2 mmHg.  Regurgitation mild.  Leaflets normal.    Tricuspid Valve:  Annulus dilated.  Stenosis not present.  Regurgitation mild.  Other Valve Findings:  STJ 3.28, S valsalva diameter 3.98, AV annular Diam 2.72    TV annulus 5.78cm    Aorta    Ascending Aorta:  Size dilated.  Diameter 4.8 cm.  Dissection not present.  Plaque thickness less than 3 mm.  Mobile plaque not present.  Aortic Arch:  Diameter 3.1 cm.  Dissection not present.  Plaque thickness less than 3 mm.  Mobile plaque not present.  Descending Aorta:  Diameter 3.4 cm.  Dissection not present.  Plaque thickness less than 3 mm.  Mobile plaque not present.      Atria    Right Atrium:  Spontaneous echo contrast not present.  Thrombus not present.  Tumor not present.  Device not present.    Left Atrium:  Spontaneous echo contrast not present.  Thrombus not present.  Tumor not present.  Device not present.  Left atrial appendage normal.          Diastolic Function Measurements:  Diastolic Dysfunction Grade=  E=  58.6 ms  A=  65 ms  E/A Ratio=  .9  DT=  ms  S/D=  1.3  IVRT=    Other Findings  Pericardium:  normal  Pleural Effusion:  none  Pulmonary Venous Flow:  normal    Anesthesia Information  Performed Personally  Anesthesiologist:  Bret Nixon MD      Echocardiogram Comments:  Postbypass results:  AV trace AI no AS mean gradient 1 mmHg  MV trace MR no MS mean gradient 1 mmHg  TV mild TR annulus not enlarged  LVEF 55% via visual estimation      Telemetry/EK24: Rate controlled atrial fibrillation        I personally viewed and interpreted the patient's EKG/Telemetry data.    Assessment/ Plan:       1.  Ascending aortic aneurysm s/p repair with aortic graft placement and left atrial pended clipping.  Postop  day #3.  CT surgery following.  Weaning off oxygen, on 2 L.  Did develop postop A-fib, IV amiodarone to be transition to oral.  On aspirin and statin.  On Lovenox for DVT prevention    2.  Postoperative atrial fibrillation -occurring after heart surgery.  Completed IV amiodarone loading bolus.  In sinus rhythm this morning.  Electrolytes have been replaced.  Will need to transition to oral amiodarone 40 mg twice daily for 1 week and then 200 mg daily for 30 days.  Eventually will need to have follow-up Holter monitor in the office to evaluate for recurrence postoperative atrial fibrillation.  Will defer to cardiothoracic surgery in regards to anticoagulation.    3.  Postoperative anemia, hemoglobin 9.4    Thank you for allowing me to participate in the care of Nirav KIMMIE Stephon. Feel free to contact me directly with any further questions or concerns.    Boston Wei PA-C  [facility]  03/02/24  08:34 EST

## 2024-03-02 NOTE — THERAPY TREATMENT NOTE
Patient Name: Nirav Szymanski  : 1950    MRN: [id number]                              Today's Date: 3/2/2024       Admit Date: 2024    Visit Dx:     ICD-10-CM ICD-9-CM   1. Aneurysm of ascending aorta without rupture  I71.21 441.2     Patient Active Problem List   Diagnosis    Right flank pain    Vitamin D deficiency    Vasomotor rhinitis    Spinal stenosis of lumbar region    Liver cyst    History of temporal arteritis    GERD (gastroesophageal reflux disease)    DDD (degenerative disc disease), lumbar    Aneurysm of ascending aorta    Allergic rhinitis    Adenomatous polyp of colon    Hernia of abdominal wall    Abnormal findings on diagnostic imaging of other specified body structures    Ascending aortic aneurysm     Past Medical History:   Diagnosis Date    Ascending aortic aneurysm     5.3 PER CARDIOLOGY NOTE    Colon polyps     GERD (gastroesophageal reflux disease)     History of prostate cancer     PONV (postoperative nausea and vomiting)      Past Surgical History:   Procedure Laterality Date    ASCENDING AORTIC ANEURYSM REPAIR W/ MECHANICAL AORTIC VALVE REPLACEMENT N/A 2024    Procedure: CELESTINE, STERNOTOMY, THORACIC AORTIC ANEURYSM REPAIR, CIRCULATORY ARREST, NEURO MONITORING, PRP;  Surgeon: John Akhtar MD;  Location: [facility] CVOR;  Service: Cardiothoracic;  Laterality: N/A;    CARDIAC CATHETERIZATION N/A 2024    Procedure: Left Heart Cath;  Surgeon: Suhail Bojorquez MD;  Location: [facility] CATH INVASIVE LOCATION;  Service: Cardiology;  Laterality: N/A;    CARDIAC CATHETERIZATION N/A 2024    Procedure: Coronary angiography;  Surgeon: Suhail Bojorquez MD;  Location: [facility] CATH INVASIVE LOCATION;  Service: Cardiology;  Laterality: N/A;    COLONOSCOPY      COLONOSCOPY N/A 2024    Procedure: COLONOSCOPY to cecum and into the terminal ileum with cold snare polypectomies;  Surgeon: Lexx Hill MD;  Location: [facility] ENDOSCOPY;  Service: Gastroenterology;  Laterality:  N/A;  Pre: screening, hx polyps  Post: external hemorrhoids, polyps, tattoo, diverticulosis    INGUINAL HERNIA REPAIR Right     NASAL POLYP EXCISION      PROSTATECTOMY  2004    ROTATOR CUFF REPAIR Left     2005    SINUS SURGERY      TESTICLE SURGERY      CHILDHOOD      General Information       Row Name 03/02/24 1023          Physical Therapy Time and Intention    Document Type therapy note (daily note)  -SV     Mode of Treatment individual therapy;physical therapy  -SV       Row Name 03/02/24 1023          General Information    Patient Profile Reviewed yes  -SV               User Key  (r) = Recorded By, (t) = Taken By, (c) = Cosigned By      Initials Name Provider Type    SV Jenny Lin, PT Physical Therapist                   Mobility       Row Name 03/02/24 1215          Bed Mobility    Supine-Sit Clear (Bed Mobility) minimum assist (75% patient effort);nonverbal cues (demo/gesture);verbal cues  -SV     Sit-Supine Clear (Bed Mobility) not tested  -SV     Assistive Device (Bed Mobility) bed rails;head of bed elevated  -SV       Row Name 03/02/24 1215          Sit-Stand Transfer    Sit-Stand Clear (Transfers) contact guard;verbal cues  -SV     Assistive Device (Sit-Stand Transfers) walker, front-wheeled  -SV       Row Name 03/02/24 1215          Gait/Stairs (Locomotion)    Clear Level (Gait) minimum assist (75% patient effort);1 person to manage equipment;1 person assist  -SV     Assistive Device (Gait) walker, front-wheeled  -SV     Distance in Feet (Gait) 200' slow guarded gait with shuffle pattern , cues for PLB  -SV     Deviations/Abnormal Patterns (Gait) irene decreased;festinating/shuffling;stride length decreased  -SV     Bilateral Gait Deviations forward flexed posture;hip circumduction  -SV               User Key  (r) = Recorded By, (t) = Taken By, (c) = Cosigned By      Initials Name Provider Type    SV Jenny Lin, PT Physical Therapist                    Obj/Interventions       Row Name 03/02/24 1216          Motor Skills    Therapeutic Exercise --  5 reps seated BUE cardiac ex with vc  -SV               User Key  (r) = Recorded By, (t) = Taken By, (c) = Cosigned By      Initials Name Provider Type    Jenny Mitchell, PT Physical Therapist                   Goals/Plan    No documentation.                  Clinical Impression       Row Name 03/02/24 1217          Pain    Pre/Posttreatment Pain Comment smiling , no report of pain today  -SV     Pain Intervention(s) Repositioned;Ambulation/increased activity  -SV       Row Name 03/02/24 1217          Plan of Care Review    Plan of Care Reviewed With patient;spouse  -SV               User Key  (r) = Recorded By, (t) = Taken By, (c) = Cosigned By      Initials Name Provider Type    Jenny Mitchell, PT Physical Therapist                   Outcome Measures       Row Name 03/02/24 1217          How much help from another person do you currently need...    Turning from your back to your side while in flat bed without using bedrails? 3  -SV     Moving from lying on back to sitting on the side of a flat bed without bedrails? 3  -SV     Moving to and from a bed to a chair (including a wheelchair)? 3  -SV     Standing up from a chair using your arms (e.g., wheelchair, bedside chair)? 3  -SV     Climbing 3-5 steps with a railing? 2  -SV     To walk in hospital room? 3  -SV     AM-PAC 6 Clicks Score (PT) 17  -SV     Highest Level of Mobility Goal 5 --> Static standing  -SV               User Key  (r) = Recorded By, (t) = Taken By, (c) = Cosigned By      Initials Name Provider Type    Jenny Mitchell, PT Physical Therapist                                 Physical Therapy Education       Title: PT OT SLP Therapies (Done)       Topic: Physical Therapy (Done)       Point: Mobility training (Done)       Learning Progress Summary             Patient Acceptance, E,TB, VU,DU,NR by  at 3/1/2024 1325    Acceptance, CYNTHIA TURCIOS DU by  PC at 2/29/2024 1039                         Point: Home exercise program (Done)       Learning Progress Summary             Patient Acceptance, E,TB, VU,DU,NR by  at 3/1/2024 1325    Acceptance, E,D, DU by PC at 2/29/2024 1039                         Point: Body mechanics (Done)       Learning Progress Summary             Patient Acceptance, E,TB, VU,DU,NR by  at 3/1/2024 1325    Acceptance, E,D, DU by  at 2/29/2024 1039                         Point: Precautions (Done)       Learning Progress Summary             Patient Acceptance, E,TB, VU,DU,NR by  at 3/1/2024 1325    Acceptance, E,D, DU by  at 2/29/2024 1039                                         User Key       Initials Effective Dates Name Provider Type Discipline    PC 06/16/21 -  Makayla Mac, PT Physical Therapist PT     09/22/22 -  Clover Quiroz, PT Physical Therapist PT                  PT Recommendation and Plan     Plan of Care Reviewed With: patient, spouse     Time Calculation:         PT Charges       Row Name 03/02/24 1220             Time Calculation    Start Time 1023  -SV      Stop Time 1040  -SV      Time Calculation (min) 17 min  -SV      PT Received On 03/02/24  -SV      PT - Next Appointment 03/03/24  -SV                User Key  (r) = Recorded By, (t) = Taken By, (c) = Cosigned By      Initials Name Provider Type    SV Jenny Lin, PT Physical Therapist                  Therapy Charges for Today       Code Description Service Date Service Provider Modifiers Qty    86877703688 HC PT THERAPEUTIC ACT EA 15 MIN 3/2/2024 Jenny Lin, PT GP 1    12234039810 HC PT THER SUPP EA 15 MIN 3/2/2024 Jenny Lin, PT GP 1            PT G-Codes  Outcome Measure Options: AM-PAC 6 Clicks Basic Mobility (PT)  AM-PAC 6 Clicks Score (PT): 17       Jenny Lin PT  3/2/2024

## 2024-03-02 NOTE — PLAN OF CARE
Goal Outcome Evaluation:  Plan of Care Reviewed With: patient, spouse         Pt just back in bed from xray but agreeable to PT. Moved sup to sit with min x1. Performed several BUE cardiac ex in sitting. Pt STS with cga/min to rwx. He amb with rwx 200' with cga/min plus one with equip: CT, cath, Iv, pacer. Pt tolerated well but with shuffle patter, slow gaurded pace with flexed pattern. UIC with RN present post PT.

## 2024-03-02 NOTE — PLAN OF CARE
Goal Outcome Evaluation:  Plan of Care Reviewed With: patient, spouse           Outcome Evaluation: Patient s/p Triple A repair on 2/28 POD 3. Patient with c/o pain in his chest and back. Patient pain controlled with current Harbinger. Patient up to BSC with x2 assist. Patient b/p 's/60's HR 50-60's remains SB/SR. Will continue to monitor and await discharge plans.

## 2024-03-02 NOTE — PROGRESS NOTES
He is back in sinus rhythm.  Discontinue to the chest tubes and Masters.  Leave wires for 1 more day.  Oral amiodarone tomorrow.

## 2024-03-03 ENCOUNTER — APPOINTMENT (OUTPATIENT)
Dept: GENERAL RADIOLOGY | Facility: HOSPITAL | Age: 74
DRG: 221 | End: 2024-03-03
Payer: MEDICARE

## 2024-03-03 LAB
ANION GAP SERPL CALCULATED.3IONS-SCNC: 7 MMOL/L (ref 5–15)
BUN SERPL-MCNC: 43 MG/DL (ref 8–23)
BUN/CREAT SERPL: 36.1 (ref 7–25)
CALCIUM SPEC-SCNC: 8.2 MG/DL (ref 8.6–10.5)
CHLORIDE SERPL-SCNC: 103 MMOL/L (ref 98–107)
CO2 SERPL-SCNC: 25 MMOL/L (ref 22–29)
CREAT SERPL-MCNC: 1.19 MG/DL (ref 0.76–1.27)
DEPRECATED RDW RBC AUTO: 41.6 FL (ref 37–54)
EGFRCR SERPLBLD CKD-EPI 2021: 63.3 ML/MIN/1.73
ERYTHROCYTE [DISTWIDTH] IN BLOOD BY AUTOMATED COUNT: 12.7 % (ref 12.3–15.4)
GLUCOSE BLDC GLUCOMTR-MCNC: 97 MG/DL (ref 70–130)
GLUCOSE SERPL-MCNC: 97 MG/DL (ref 65–99)
HCT VFR BLD AUTO: 27.6 % (ref 37.5–51)
HGB BLD-MCNC: 9 G/DL (ref 13–17.7)
MCH RBC QN AUTO: 29.8 PG (ref 26.6–33)
MCHC RBC AUTO-ENTMCNC: 32.6 G/DL (ref 31.5–35.7)
MCV RBC AUTO: 91.4 FL (ref 79–97)
PLATELET # BLD AUTO: 123 10*3/MM3 (ref 140–450)
PMV BLD AUTO: 10.9 FL (ref 6–12)
POTASSIUM SERPL-SCNC: 3.9 MMOL/L (ref 3.5–5.2)
RBC # BLD AUTO: 3.02 10*6/MM3 (ref 4.14–5.8)
SODIUM SERPL-SCNC: 135 MMOL/L (ref 136–145)
WBC NRBC COR # BLD AUTO: 9.41 10*3/MM3 (ref 3.4–10.8)

## 2024-03-03 PROCEDURE — 80048 BASIC METABOLIC PNL TOTAL CA: CPT | Performed by: NURSE PRACTITIONER

## 2024-03-03 PROCEDURE — 84132 ASSAY OF SERUM POTASSIUM: CPT | Performed by: INTERNAL MEDICINE

## 2024-03-03 PROCEDURE — 85027 COMPLETE CBC AUTOMATED: CPT | Performed by: NURSE PRACTITIONER

## 2024-03-03 PROCEDURE — 25010000002 ENOXAPARIN PER 10 MG: Performed by: NURSE PRACTITIONER

## 2024-03-03 PROCEDURE — 82948 REAGENT STRIP/BLOOD GLUCOSE: CPT

## 2024-03-03 PROCEDURE — 71045 X-RAY EXAM CHEST 1 VIEW: CPT

## 2024-03-03 PROCEDURE — 99232 SBSQ HOSP IP/OBS MODERATE 35: CPT | Performed by: PHYSICIAN ASSISTANT

## 2024-03-03 PROCEDURE — 97530 THERAPEUTIC ACTIVITIES: CPT

## 2024-03-03 RX ORDER — POTASSIUM CHLORIDE 750 MG/1
20 TABLET, FILM COATED, EXTENDED RELEASE ORAL ONCE
Status: COMPLETED | OUTPATIENT
Start: 2024-03-03 | End: 2024-03-03

## 2024-03-03 RX ADMIN — AMIODARONE HYDROCHLORIDE 400 MG: 200 TABLET ORAL at 20:54

## 2024-03-03 RX ADMIN — ASPIRIN 81 MG: 81 TABLET, COATED ORAL at 08:59

## 2024-03-03 RX ADMIN — GABAPENTIN 100 MG: 100 CAPSULE ORAL at 20:55

## 2024-03-03 RX ADMIN — AMIODARONE HYDROCHLORIDE 400 MG: 200 TABLET ORAL at 08:58

## 2024-03-03 RX ADMIN — LIDOCAINE 2 PATCH: 4 PATCH TOPICAL at 08:59

## 2024-03-03 RX ADMIN — ATENOLOL 25 MG: 25 TABLET ORAL at 08:58

## 2024-03-03 RX ADMIN — PANTOPRAZOLE SODIUM 40 MG: 40 TABLET, DELAYED RELEASE ORAL at 07:02

## 2024-03-03 RX ADMIN — DOCUSATE SODIUM 50MG AND SENNOSIDES 8.6MG 2 TABLET: 8.6; 5 TABLET, FILM COATED ORAL at 20:54

## 2024-03-03 RX ADMIN — ATORVASTATIN CALCIUM 40 MG: 20 TABLET, FILM COATED ORAL at 20:54

## 2024-03-03 RX ADMIN — ATENOLOL 25 MG: 25 TABLET ORAL at 20:54

## 2024-03-03 RX ADMIN — GUAIFENESIN 1200 MG: 600 TABLET, EXTENDED RELEASE ORAL at 08:59

## 2024-03-03 RX ADMIN — GUAIFENESIN 1200 MG: 600 TABLET, EXTENDED RELEASE ORAL at 20:54

## 2024-03-03 RX ADMIN — GABAPENTIN 100 MG: 100 CAPSULE ORAL at 08:59

## 2024-03-03 RX ADMIN — POTASSIUM CHLORIDE 20 MEQ: 750 TABLET, EXTENDED RELEASE ORAL at 08:59

## 2024-03-03 RX ADMIN — MUPIROCIN: 20 OINTMENT TOPICAL at 20:56

## 2024-03-03 RX ADMIN — ENOXAPARIN SODIUM 40 MG: 100 INJECTION SUBCUTANEOUS at 17:43

## 2024-03-03 RX ADMIN — CYCLOBENZAPRINE 10 MG: 10 TABLET, FILM COATED ORAL at 20:54

## 2024-03-03 RX ADMIN — 0.12% CHLORHEXIDINE GLUCONATE 15 ML: 1.2 RINSE ORAL at 08:58

## 2024-03-03 NOTE — PROGRESS NOTES
Patient Name: Nirav Szymanski  Age/Sex: 74 y.o. male  : 1950  MRN: [id number]    Date of Admission: 2024  Date of Encounter Visit: 24  Encounter Provider: Boston Wei PA-C  Place of Service: [facility] CARDIOLOGY      Subjective:       Chief Complaint: Postop, ascending aortic aneurysm     History of Present Illness:  Nirav Szymanski is a 74 y.o. male who underwent a aortic aneurysm repair with 32 mm Terumo Gelwave Dacron interposition graft on 2024.  During that time, he also had a left atrial appendage clip placed and a proximal aortic arch graft placed.     He developed postoperative atrial fibrillation on 3/1/2024.  He has been on IV amiodarone since.     PMH: Ascending aortic aneurysm s/p repair    Follow up:   24 Blood pressure has been soft overnight into the 90s systolic.  Currently on 2 L nasal cannula.  1.5 L out overnight.  On IV amiodarone 0.5 mcg.  Maintaining sinus rhythm.  Would recommend transitioning this to oral amiodarone.     24 chest tubes were removed yesterday.  Planning to remove Cordis wires today.  No recurrence of atrial fibrillation since transitioning to oral amiodarone.  Denies any dizziness, lightheadedness or chest pressure this morning.  Overall doing well no new complaints    Previous testing:   Echo 12/15/2023: EF of 55 to 60% with normal LV size.  Aortic root 3.9 cm with ascending aorta measuring 5.3 cm.  Grade 1 diastolic dysfunction.  Mildly dilated left atrium.  Mild MR and TR.  RVSP 35 mmHg.  Heart has been well-controlled overnight into the 80s and 90s.  He is on room air.       Heart Cath 2024: Normal coronary arteries with no significant coronary stenosis    Home Medications:   Medications Prior to Admission   Medication Sig Dispense Refill Last Dose    chlorhexidine (PERIDEX) 0.12 % solution Apply 15 mL to the mouth or throat Take As Directed. PRIOR TO OR   2024 at 0420    mupirocin  (BACTROBAN) 2 % nasal ointment into the nostril(s) as directed by provider Take As Directed. PRIOR TO OR   2/28/2024 at 0420    aspirin 81 MG EC tablet Take 1 tablet by mouth Daily. HELD FOR OR   2/17/2024    B Complex Vitamins (VITAMIN B COMPLEX PO) Take 1 tablet by mouth Daily. HELD FOR OR   2/17/2024    docusate sodium (COLACE) 250 MG capsule Take 1 capsule by mouth Daily.   2/17/2024    famotidine (PEPCID) 20 MG tablet Take 1 tablet by mouth 2 (Two) Times a Day.   2/17/2024    fexofenadine (ALLEGRA) 180 MG tablet Take 1 tablet by mouth Every Night.   2/17/2024    ipratropium (ATROVENT) 0.03 % nasal spray 2 sprays into the nostril(s) as directed by provider As Needed for Rhinitis.       Magnesium 400 MG tablet Take 400 mg by mouth Daily. HELD FOR OR       Turmeric (QC TUMERIC COMPLEX PO) Take 1 tablet by mouth Daily. HELD FOR OR   2/17/2024    vitamin D3 125 MCG (5000 UT) capsule capsule Take 1 capsule by mouth Daily.   2/17/2024       Allergies:  Allergies   Allergen Reactions    Famotidine Swelling and Rash    Ranitidine Hcl Swelling         Review of Systems:  All systems reviewed. Pertinent positives identified in HPI. All other systems are negative.       Objective:     Objective:  Temp:  [97.7 °F (36.5 °C)-98.5 °F (36.9 °C)] 98.3 °F (36.8 °C)  Heart Rate:  [57-78] 78  Resp:  [16] 16  BP: ()/(61-68) 118/61    Intake/Output Summary (Last 24 hours) at 3/3/2024 0856  Last data filed at 3/3/2024 0814  Gross per 24 hour   Intake 720 ml   Output 665 ml   Net 55 ml     Body mass index is 34.36 kg/m².      03/01/24  1100 03/02/24  0627 03/03/24  0646   Weight: 95.2 kg (209 lb 12.8 oz) 96.3 kg (212 lb 6.4 oz) 96.6 kg (212 lb 14.4 oz)         Physical Exam:   General: 74 y.o. male No acute distress, sitting up in bed. Alert and Oriented.   Neck: No JVD or carotid bruit  Lungs: Clear to ausculation bilaterally, symmetric  Heart: Regular rate and rhythm with no overt murmurs, rubs or gallops. Normal S1 and S2.   Midline sternal incision present  Abdomen: soft, non-tender  Extremities: No lower extremity edema or cyanosis.   Neuo: no lateralizing defects.     Lab Review:     Results from last 7 days   Lab Units 03/03/24  0506 03/02/24  0353 03/01/24  0430   SODIUM mmol/L 135* 137 141   POTASSIUM mmol/L 3.9 4.1 4.3   CHLORIDE mmol/L 103 102 105   CO2 mmol/L 25.0 25.0 22.4   BUN mg/dL 43* 37* 30*   CREATININE mg/dL 1.19 1.15 1.27   GLUCOSE mg/dL 97 109* 122*   CALCIUM mg/dL 8.2* 8.3* 8.2*           Results from last 7 days   Lab Units 03/03/24  0506   WBC 10*3/mm3 9.41   HEMOGLOBIN g/dL 9.0*   HEMATOCRIT % 27.6*   PLATELETS 10*3/mm3 123*     Results from last 7 days   Lab Units 02/29/24  0320 02/28/24  1125 02/28/24  1042 02/26/24  0733   INR  1.30* 1.42* 1.57*  1.4* 1.06   APTT seconds  --  30.5 33.1 29.5     Results from last 7 days   Lab Units 02/26/24  0733   CHOLESTEROL mg/dL 147     Results from last 7 days   Lab Units 02/29/24  0320   MAGNESIUM mg/dL 2.9*     Results from last 7 days   Lab Units 02/26/24  0733   CHOLESTEROL mg/dL 147   TRIGLYCERIDES mg/dL 60   HDL CHOL mg/dL 48   LDL CHOL mg/dL 87     Results from last 7 days   Lab Units 02/26/24  0733   PROBNP pg/mL 133.0               Imaging:    chest X-ray    Results for orders placed in visit on 02/28/24    Diagnostic IntraOp Orestes    Narrative  Diagnostic IntraOp Orestes    Procedure Performed: Diagnostic IntraOp Orestes  Start Time:  2/28/2024 7:45 AM  End Time:   2/28/2024 7:58 AM    Preanesthesia Checklist:  Patient identified, IV assessed, risks and benefits discussed, monitors and equipment assessed, procedure being performed at surgeon's request and anesthesia consent obtained.    General Procedure Information  Diagnostic Indications for Echo:  assessment of ascending aorta, assessment of surgical repair and hemodynamic monitoring  Physician Requesting Echo: John Akhtar MD  CPT Code:  71849, 61691  ICD Code for Medical Necessity:  I70.0, I 34.0  Location  performed:  OR  Intubated  Bite block placed  Heart visualized  Probe Insertion:  Easy  Probe Type:  Multiplane  Modalities:  Color flow mapping, pulse wave Doppler and continuous wave Doppler    Echocardiographic and Doppler Measurements    Ventricles    Right Ventricle:  Thrombus not present.  Global function normal.  Left Ventricle:  Thrombus not present.  Global Function normal.  Ejection Fraction 65%.        Valves    Aortic Valve:  Annulus dilated.  Stenosis not present.  Area: 3.09 cm².  Mean Gradient: 2 mmHg.  Regurgitation trace.  Leaflets normal.  Leaflet motions normal.    Mitral Valve:  Stenosis not present.  Mean Gradient: 2 mmHg.  Regurgitation mild.  Leaflets normal.    Tricuspid Valve:  Annulus dilated.  Stenosis not present.  Regurgitation mild.  Other Valve Findings:  STJ 3.28, S valsalva diameter 3.98, AV annular Diam 2.72    TV annulus 5.78cm    Aorta    Ascending Aorta:  Size dilated.  Diameter 4.8 cm.  Dissection not present.  Plaque thickness less than 3 mm.  Mobile plaque not present.  Aortic Arch:  Diameter 3.1 cm.  Dissection not present.  Plaque thickness less than 3 mm.  Mobile plaque not present.  Descending Aorta:  Diameter 3.4 cm.  Dissection not present.  Plaque thickness less than 3 mm.  Mobile plaque not present.      Atria    Right Atrium:  Spontaneous echo contrast not present.  Thrombus not present.  Tumor not present.  Device not present.    Left Atrium:  Spontaneous echo contrast not present.  Thrombus not present.  Tumor not present.  Device not present.  Left atrial appendage normal.          Diastolic Function Measurements:  Diastolic Dysfunction Grade=  E=  58.6 ms  A=  65 ms  E/A Ratio=  .9  DT=  ms  S/D=  1.3  IVRT=    Other Findings  Pericardium:  normal  Pleural Effusion:  none  Pulmonary Venous Flow:  normal    Anesthesia Information  Performed Personally  Anesthesiologist:  Bret Nixon MD      Echocardiogram Comments:  Postbypass results:  AV trace AI no AS mean  gradient 1 mmHg  MV trace MR no MS mean gradient 1 mmHg  TV mild TR annulus not enlarged  LVEF 55% via visual estimation      Telemetry/EK24: Sinus rhythm, no repeat episodes of atrial fibrillation    I personally viewed and interpreted the patient's EKG/Telemetry data.    Assessment/ Plan:      1.  AAA s/p repair with aortic graft placement and left atrial pended clipping.  OD #4.  CTS following.  Weaning off oxygen, on 2 L.  Did develop postop A-fib, IV amiodarone to be transition to oral.  On aspirin and statin.  On Lovenox for DVT prevention     2.  Postoperative atrial fibrillation- s/p IV amiodarone.  Transition to amiodarone 400 mg twice daily x 1 week and then 200 mg daily for 30 days.  Continue atenolol 25 mg daily.  Replace electrolytes as needed. Eventually will need to have follow-up Holter monitor in the office to evaluate for recurrence postoperative atrial fibrillation.  Will defer to cardiothoracic surgery in regards to anticoagulation.     3.  Postoperative anemia, hemoglobin 9 this morning.    Thank you for allowing me to participate in the care of Niravcarmen Szymanski. Feel free to contact me directly with any further questions or concerns.    Boston Wei PA-C  [facility]  24  08:56 EST

## 2024-03-03 NOTE — PLAN OF CARE
Goal Outcome Evaluation:                 Pt POD4 s/p AAA. VSS, SR, BP 100s-110s, on room air. Removed wires, chest tubes, and IJ. Possibly home tomorrow. Safety precautions in place, call light within reach. Care ongoing.

## 2024-03-03 NOTE — PLAN OF CARE
Goal Outcome Evaluation:  Plan of Care Reviewed With: patient   Pt uic and on RA today. He reported marked fatigue today with lack of sleep. Pt STS to rwx with cga. He amb 120' with cga using rwx. Flexed posture and slow guarded gait noted. Less distance today due to fatigue. O2 sat on RA 90%. UIC for bath with CNA after PT.

## 2024-03-03 NOTE — THERAPY TREATMENT NOTE
Patient Name: Nirav Szymanski  : 1950    MRN: [id number]                              Today's Date: 3/3/2024       Admit Date: 2024    Visit Dx:     ICD-10-CM ICD-9-CM   1. Aneurysm of ascending aorta without rupture  I71.21 441.2     Patient Active Problem List   Diagnosis    Right flank pain    Vitamin D deficiency    Vasomotor rhinitis    Spinal stenosis of lumbar region    Liver cyst    History of temporal arteritis    GERD (gastroesophageal reflux disease)    DDD (degenerative disc disease), lumbar    Aneurysm of ascending aorta    Allergic rhinitis    Adenomatous polyp of colon    Hernia of abdominal wall    Abnormal findings on diagnostic imaging of other specified body structures    Ascending aortic aneurysm     Past Medical History:   Diagnosis Date    Ascending aortic aneurysm     5.3 PER CARDIOLOGY NOTE    Colon polyps     GERD (gastroesophageal reflux disease)     History of prostate cancer     PONV (postoperative nausea and vomiting)      Past Surgical History:   Procedure Laterality Date    ASCENDING AORTIC ANEURYSM REPAIR W/ MECHANICAL AORTIC VALVE REPLACEMENT N/A 2024    Procedure: CELESTINE, STERNOTOMY, THORACIC AORTIC ANEURYSM REPAIR, CIRCULATORY ARREST, NEURO MONITORING, PRP;  Surgeon: John Akhtar MD;  Location: [facility] CVOR;  Service: Cardiothoracic;  Laterality: N/A;    CARDIAC CATHETERIZATION N/A 2024    Procedure: Left Heart Cath;  Surgeon: Suhail Bojorquez MD;  Location: [facility] CATH INVASIVE LOCATION;  Service: Cardiology;  Laterality: N/A;    CARDIAC CATHETERIZATION N/A 2024    Procedure: Coronary angiography;  Surgeon: Suhail Bojorquez MD;  Location: [facility] CATH INVASIVE LOCATION;  Service: Cardiology;  Laterality: N/A;    COLONOSCOPY      COLONOSCOPY N/A 2024    Procedure: COLONOSCOPY to cecum and into the terminal ileum with cold snare polypectomies;  Surgeon: Lexx Hill MD;  Location: [facility] ENDOSCOPY;  Service: Gastroenterology;  Laterality:  N/A;  Pre: screening, hx polyps  Post: external hemorrhoids, polyps, tattoo, diverticulosis    INGUINAL HERNIA REPAIR Right     NASAL POLYP EXCISION      PROSTATECTOMY  2004    ROTATOR CUFF REPAIR Left     2005    SINUS SURGERY      TESTICLE SURGERY      CHILDHOOD      General Information       Row Name 03/03/24 1033          Physical Therapy Time and Intention    Document Type therapy note (daily note)  -SV     Mode of Treatment individual therapy;physical therapy  -SV       Row Name 03/03/24 1033          General Information    Patient Profile Reviewed yes  -SV               User Key  (r) = Recorded By, (t) = Taken By, (c) = Cosigned By      Initials Name Provider Type    SV Jenny Lin, PT Physical Therapist                   Mobility       Row Name 03/03/24 1239          Bed Mobility    Supine-Sit [address] (Bed Mobility) not tested  -SV     Sit-Supine [address] (Bed Mobility) not tested  -SV       Row Name 03/03/24 1239          Sit-Stand Transfer    Sit-Stand [address] (Transfers) contact guard;verbal cues  -SV     Assistive Device (Sit-Stand Transfers) walker, front-wheeled  -SV       Row Name 03/03/24 1239          Gait/Stairs (Locomotion)    [address] Level (Gait) contact guard  -SV     Assistive Device (Gait) walker, 4-wheeled  -SV     Distance in Feet (Gait) 120' slow guarded gait with shuffle pattern, vc for PLB  -SV               User Key  (r) = Recorded By, (t) = Taken By, (c) = Cosigned By      Initials Name Provider Type    Jenny Mitchell, PT Physical Therapist                   Obj/Interventions       Row Name 03/03/24 1240          Motor Skills    Therapeutic Exercise --  no ex today due to pt fatigued uic  -SV               User Key  (r) = Recorded By, (t) = Taken By, (c) = Cosigned By      Initials Name Provider Type    Jenny Mitchell, PT Physical Therapist                   Goals/Plan    No documentation.                  Clinical Impression       Row Name 03/03/24  1241          Pain    Pre/Posttreatment Pain Comment Pt reported fatigue today, intterupted sleep  -SV     Pain Intervention(s) Ambulation/increased activity;Repositioned  -SV       Row Name 03/03/24 1241          Plan of Care Review    Plan of Care Reviewed With patient  -SV       Row Name 03/03/24 1241          Therapy Assessment/Plan (PT)    Criteria for Skilled Interventions Met (PT) yes  -SV       Row Name 03/03/24 1241          Vital Signs    Pre SpO2 (%) 93  -SV     O2 Delivery Pre Treatment room air  -SV     Intra SpO2 (%) 90  -SV     O2 Delivery Intra Treatment room air  -SV     Post SpO2 (%) 92  -SV     O2 Delivery Post Treatment room air  -SV     Pre Patient Position Sitting  -SV     Intra Patient Position Standing  -SV     Post Patient Position Sitting  -SV       Row Name 03/03/24 1241          Positioning and Restraints    Pre-Treatment Position sitting in chair/recliner  -SV     Post Treatment Position chair  -SV     In Chair with nsg;encouraged to call for assist;call light within reach;notified nsg  -SV               User Key  (r) = Recorded By, (t) = Taken By, (c) = Cosigned By      Initials Name Provider Type    SV Jenny Lin, PT Physical Therapist                   Outcome Measures       Row Name 03/03/24 1245          How much help from another person do you currently need...    Turning from your back to your side while in flat bed without using bedrails? 3  -SV     Moving from lying on back to sitting on the side of a flat bed without bedrails? 3  -SV     Moving to and from a bed to a chair (including a wheelchair)? 3  -SV     Standing up from a chair using your arms (e.g., wheelchair, bedside chair)? 3  -SV     Climbing 3-5 steps with a railing? 3  -SV     To walk in hospital room? 3  -SV     AM-PAC 6 Clicks Score (PT) 18  -SV     Highest Level of Mobility Goal 6 --> Walk 10 steps or more  -SV               User Key  (r) = Recorded By, (t) = Taken By, (c) = Cosigned By      Initials Name  Provider Type     Jenny Lin, PT Physical Therapist                                 Physical Therapy Education       Title: PT OT SLP Therapies (Done)       Topic: Physical Therapy (Done)       Point: Mobility training (Done)       Learning Progress Summary             Patient Acceptance, E,TB, VU,DU,NR by  at 3/1/2024 1325    Acceptance, E,D, DU by  at 2/29/2024 1039                         Point: Home exercise program (Done)       Learning Progress Summary             Patient Acceptance, E,TB, VU,DU,NR by  at 3/1/2024 1325    Acceptance, E,D, DU by  at 2/29/2024 1039                         Point: Body mechanics (Done)       Learning Progress Summary             Patient Acceptance, E,TB, VU,DU,NR by  at 3/1/2024 1325    Acceptance, E,D, DU by  at 2/29/2024 1039                         Point: Precautions (Done)       Learning Progress Summary             Patient Acceptance, E,TB, VU,DU,NR by  at 3/1/2024 1325    Acceptance, E,D, DU by  at 2/29/2024 1039                                         User Key       Initials Effective Dates Name Provider Type Discipline    PC 06/16/21 -  Makayla Mac PT Physical Therapist PT     09/22/22 -  Clover Quiroz PT Physical Therapist PT                  PT Recommendation and Plan     Plan of Care Reviewed With: patient     Time Calculation:         PT Charges       Row Name 03/03/24 1248             Time Calculation    Start Time 1003  -SV      Stop Time 1016  -SV      Time Calculation (min) 13 min  -SV      PT Received On 03/03/24  -      PT - Next Appointment 03/04/24  -                User Key  (r) = Recorded By, (t) = Taken By, (c) = Cosigned By      Initials Name Provider Type    SV Jenny Lin, PT Physical Therapist                  Therapy Charges for Today       Code Description Service Date Service Provider Modifiers Qty    33584810374 HC PT THERAPEUTIC ACT EA 15 MIN 3/2/2024 Jenny Lin, PT GP 1    06569517508 HC PT THER SUPP  EA 15 MIN 3/2/2024 Jenny Lin, PT GP 1    41263706391 HC PT THERAPEUTIC ACT EA 15 MIN 3/3/2024 Jenny Lin, PT GP 1            PT G-Codes  Outcome Measure Options: AM-PAC 6 Clicks Basic Mobility (PT)  AM-PAC 6 Clicks Score (PT): 18       Jenny Lin, PT  3/3/2024

## 2024-03-03 NOTE — PROGRESS NOTES
Normal course.  He has remained in sinus rhythm.  Discontinue wires and Cordis.  Discontinue aortic drain.  May be home tomorrow.  He is on oral amiodarone now.  I discontinued Lasix since his BUN is up a bit

## 2024-03-04 ENCOUNTER — APPOINTMENT (OUTPATIENT)
Dept: GENERAL RADIOLOGY | Facility: HOSPITAL | Age: 74
DRG: 221 | End: 2024-03-04
Payer: MEDICARE

## 2024-03-04 LAB
ANION GAP SERPL CALCULATED.3IONS-SCNC: 10.3 MMOL/L (ref 5–15)
BUN SERPL-MCNC: 34 MG/DL (ref 8–23)
BUN/CREAT SERPL: 29.3 (ref 7–25)
CALCIUM SPEC-SCNC: 8 MG/DL (ref 8.6–10.5)
CHLORIDE SERPL-SCNC: 102 MMOL/L (ref 98–107)
CO2 SERPL-SCNC: 23.7 MMOL/L (ref 22–29)
CREAT SERPL-MCNC: 1.16 MG/DL (ref 0.76–1.27)
EGFRCR SERPLBLD CKD-EPI 2021: 65.3 ML/MIN/1.73
GLUCOSE SERPL-MCNC: 100 MG/DL (ref 65–99)
NT-PROBNP SERPL-MCNC: 6031 PG/ML (ref 0–1800)
POTASSIUM SERPL-SCNC: 4 MMOL/L (ref 3.5–5.2)
POTASSIUM SERPL-SCNC: 4.1 MMOL/L (ref 3.5–5.2)
QT INTERVAL: 466 MS
QTC INTERVAL: 485 MS
SODIUM SERPL-SCNC: 136 MMOL/L (ref 136–145)

## 2024-03-04 PROCEDURE — 99024 POSTOP FOLLOW-UP VISIT: CPT | Performed by: THORACIC SURGERY (CARDIOTHORACIC VASCULAR SURGERY)

## 2024-03-04 PROCEDURE — 83880 ASSAY OF NATRIURETIC PEPTIDE: CPT | Performed by: NURSE PRACTITIONER

## 2024-03-04 PROCEDURE — 94799 UNLISTED PULMONARY SVC/PX: CPT

## 2024-03-04 PROCEDURE — 25010000002 FUROSEMIDE PER 20 MG: Performed by: NURSE PRACTITIONER

## 2024-03-04 PROCEDURE — 25010000002 ENOXAPARIN PER 10 MG: Performed by: NURSE PRACTITIONER

## 2024-03-04 PROCEDURE — 25010000002 CALCIUM GLUCONATE-NACL 1-0.675 GM/50ML-% SOLUTION: Performed by: NURSE PRACTITIONER

## 2024-03-04 PROCEDURE — 71045 X-RAY EXAM CHEST 1 VIEW: CPT

## 2024-03-04 PROCEDURE — 93005 ELECTROCARDIOGRAM TRACING: CPT | Performed by: NURSE PRACTITIONER

## 2024-03-04 PROCEDURE — 99232 SBSQ HOSP IP/OBS MODERATE 35: CPT | Performed by: NURSE PRACTITIONER

## 2024-03-04 PROCEDURE — 93010 ELECTROCARDIOGRAM REPORT: CPT | Performed by: INTERNAL MEDICINE

## 2024-03-04 PROCEDURE — 97110 THERAPEUTIC EXERCISES: CPT

## 2024-03-04 PROCEDURE — 80048 BASIC METABOLIC PNL TOTAL CA: CPT | Performed by: NURSE PRACTITIONER

## 2024-03-04 RX ORDER — AMIODARONE HYDROCHLORIDE 200 MG/1
200 TABLET ORAL EVERY 12 HOURS SCHEDULED
Status: DISCONTINUED | OUTPATIENT
Start: 2024-03-04 | End: 2024-03-05

## 2024-03-04 RX ORDER — CALCIUM GLUCONATE 20 MG/ML
1000 INJECTION, SOLUTION INTRAVENOUS ONCE
Status: COMPLETED | OUTPATIENT
Start: 2024-03-04 | End: 2024-03-04

## 2024-03-04 RX ORDER — DIPHENOXYLATE HYDROCHLORIDE AND ATROPINE SULFATE 2.5; .025 MG/1; MG/1
1 TABLET ORAL DAILY
Status: DISCONTINUED | OUTPATIENT
Start: 2024-03-04 | End: 2024-03-05 | Stop reason: HOSPADM

## 2024-03-04 RX ORDER — FUROSEMIDE 10 MG/ML
40 INJECTION INTRAMUSCULAR; INTRAVENOUS ONCE
Status: COMPLETED | OUTPATIENT
Start: 2024-03-04 | End: 2024-03-04

## 2024-03-04 RX ORDER — FERROUS SULFATE 325(65) MG
325 TABLET ORAL
Status: DISCONTINUED | OUTPATIENT
Start: 2024-03-04 | End: 2024-03-05 | Stop reason: HOSPADM

## 2024-03-04 RX ADMIN — GUAIFENESIN 1200 MG: 600 TABLET, EXTENDED RELEASE ORAL at 20:29

## 2024-03-04 RX ADMIN — ATORVASTATIN CALCIUM 40 MG: 20 TABLET, FILM COATED ORAL at 20:28

## 2024-03-04 RX ADMIN — ATENOLOL 25 MG: 25 TABLET ORAL at 20:33

## 2024-03-04 RX ADMIN — FERROUS SULFATE TAB 325 MG (65 MG ELEMENTAL FE) 325 MG: 325 (65 FE) TAB at 08:53

## 2024-03-04 RX ADMIN — AMIODARONE HYDROCHLORIDE 200 MG: 200 TABLET ORAL at 20:29

## 2024-03-04 RX ADMIN — CALCIUM GLUCONATE 1000 MG: 20 INJECTION, SOLUTION INTRAVENOUS at 08:55

## 2024-03-04 RX ADMIN — LIDOCAINE 2 PATCH: 4 PATCH TOPICAL at 09:30

## 2024-03-04 RX ADMIN — ATENOLOL 25 MG: 25 TABLET ORAL at 08:55

## 2024-03-04 RX ADMIN — GABAPENTIN 100 MG: 100 CAPSULE ORAL at 20:29

## 2024-03-04 RX ADMIN — DOCUSATE SODIUM 50MG AND SENNOSIDES 8.6MG 2 TABLET: 8.6; 5 TABLET, FILM COATED ORAL at 20:29

## 2024-03-04 RX ADMIN — PANTOPRAZOLE SODIUM 40 MG: 40 TABLET, DELAYED RELEASE ORAL at 05:45

## 2024-03-04 RX ADMIN — FUROSEMIDE 40 MG: 10 INJECTION, SOLUTION INTRAMUSCULAR; INTRAVENOUS at 11:16

## 2024-03-04 RX ADMIN — Medication 1 TABLET: at 11:16

## 2024-03-04 RX ADMIN — ASPIRIN 81 MG: 81 TABLET, COATED ORAL at 08:55

## 2024-03-04 RX ADMIN — GABAPENTIN 100 MG: 100 CAPSULE ORAL at 08:55

## 2024-03-04 RX ADMIN — HYDROCODONE BITARTRATE AND ACETAMINOPHEN 2 TABLET: 5; 325 TABLET ORAL at 08:55

## 2024-03-04 RX ADMIN — ENOXAPARIN SODIUM 40 MG: 100 INJECTION SUBCUTANEOUS at 18:55

## 2024-03-04 RX ADMIN — CYCLOBENZAPRINE 10 MG: 10 TABLET, FILM COATED ORAL at 20:29

## 2024-03-04 RX ADMIN — AMIODARONE HYDROCHLORIDE 200 MG: 200 TABLET ORAL at 08:55

## 2024-03-04 RX ADMIN — GUAIFENESIN 1200 MG: 600 TABLET, EXTENDED RELEASE ORAL at 08:55

## 2024-03-04 NOTE — PLAN OF CARE
Goal Outcome Evaluation:  Plan of Care Reviewed With: patient        Progress: improving  Outcome Evaluation: Patient pleasant and agreeable to PT- sitting UIC with spouse at bedside. SBA for bed mobility and ambulated 200' SBA-CGA without any AD. Slowed irene with shuffling gait though no overt LOB or veering noted. Reviewed energy conservation techniques. PT will begin follow peripherally based on current mobility status.      Anticipated Discharge Disposition (PT): home with assist, home with home health

## 2024-03-04 NOTE — PROGRESS NOTES
" LOS: 5 days   Patient Care Team:  Hai Bender MD as PCP - General (Internal Medicine)  Suhail Bojorquez MD as Consulting Physician (Cardiology)    Chief Complaint: post op    Subjective:  Symptoms:  He reports shortness of breath.  No cough or chest pain.    Diet:  Adequate intake.  No nausea or vomiting.    Activity level: Impaired due to weakness.    Pain:  He complains of pain that is mild.  Pain is well controlled.      Vital Signs  Temp:  [97.8 °F (36.6 °C)-100.2 °F (37.9 °C)] 100 °F (37.8 °C)  Heart Rate:  [65-72] 67  Resp:  [16] 16  BP: (100-121)/(55-64) 100/64  Body mass index is 33.98 kg/m².    Intake/Output Summary (Last 24 hours) at 3/4/2024 0746  Last data filed at 3/3/2024 2200  Gross per 24 hour   Intake 720 ml   Output 550 ml   Net 170 ml     No intake/output data recorded.          03/02/24  0627 03/03/24  0646 03/04/24  0500   Weight: 96.3 kg (212 lb 6.4 oz) 96.6 kg (212 lb 14.4 oz) 95.5 kg (210 lb 8 oz)         Objective:  General Appearance:  Comfortable and in no acute distress.    Vital signs: (most recent): Blood pressure 100/64, pulse 67, temperature 100 °F (37.8 °C), temperature source Oral, resp. rate 16, height 167.6 cm (66\"), weight 95.5 kg (210 lb 8 oz), SpO2 94%.  Vital signs are normal.  No fever.    Output: Producing urine and producing stool.    Lungs:  Normal effort and normal respiratory rate.  There are decreased breath sounds.    Heart: Normal rate.  Regular rhythm.    Abdomen: Abdomen is soft and distended.  Bowel sounds are normal.     Extremities: There is dependent edema.    Pulses: Distal pulses are intact.    Neurological: Patient is alert and oriented to person, place and time.    Skin:  Warm and dry.  (Sternal incision clean, dry, and intact)          Results Review:        WBC WBC   Date Value Ref Range Status   03/03/2024 9.41 3.40 - 10.80 10*3/mm3 Final   03/02/2024 11.37 (H) 3.40 - 10.80 10*3/mm3 Final      HGB Hemoglobin   Date Value Ref Range Status " "  03/03/2024 9.0 (L) 13.0 - 17.7 g/dL Final   03/02/2024 9.4 (L) 13.0 - 17.7 g/dL Final      HCT Hematocrit   Date Value Ref Range Status   03/03/2024 27.6 (L) 37.5 - 51.0 % Final   03/02/2024 28.7 (L) 37.5 - 51.0 % Final      Platelets Platelets   Date Value Ref Range Status   03/03/2024 123 (L) 140 - 450 10*3/mm3 Final   03/02/2024 121 (L) 140 - 450 10*3/mm3 Final        PT/INR:  No results found for: \"PROTIME\"/No results found for: \"INR\"    Sodium Sodium   Date Value Ref Range Status   03/04/2024 136 136 - 145 mmol/L Final   03/03/2024 135 (L) 136 - 145 mmol/L Final   03/02/2024 137 136 - 145 mmol/L Final      Potassium Potassium   Date Value Ref Range Status   03/04/2024 4.1 3.5 - 5.2 mmol/L Final   03/03/2024 4.0 3.5 - 5.2 mmol/L Final   03/03/2024 3.9 3.5 - 5.2 mmol/L Final   03/02/2024 4.1 3.5 - 5.2 mmol/L Final      Chloride Chloride   Date Value Ref Range Status   03/04/2024 102 98 - 107 mmol/L Final   03/03/2024 103 98 - 107 mmol/L Final   03/02/2024 102 98 - 107 mmol/L Final      Bicarbonate CO2   Date Value Ref Range Status   03/04/2024 23.7 22.0 - 29.0 mmol/L Final   03/03/2024 25.0 22.0 - 29.0 mmol/L Final   03/02/2024 25.0 22.0 - 29.0 mmol/L Final      BUN BUN   Date Value Ref Range Status   03/04/2024 34 (H) 8 - 23 mg/dL Final   03/03/2024 43 (H) 8 - 23 mg/dL Final   03/02/2024 37 (H) 8 - 23 mg/dL Final      Creatinine Creatinine   Date Value Ref Range Status   03/04/2024 1.16 0.76 - 1.27 mg/dL Final   03/03/2024 1.19 0.76 - 1.27 mg/dL Final   03/02/2024 1.15 0.76 - 1.27 mg/dL Final      Calcium Calcium   Date Value Ref Range Status   03/04/2024 8.0 (L) 8.6 - 10.5 mg/dL Final   03/03/2024 8.2 (L) 8.6 - 10.5 mg/dL Final   03/02/2024 8.3 (L) 8.6 - 10.5 mg/dL Final      Magnesium No results found for: \"MG\"       amiodarone, 200 mg, Oral, Q12H  aspirin, 81 mg, Oral, Daily  atenolol, 25 mg, Oral, Q12H  atorvastatin, 40 mg, Oral, Nightly  calcium gluconate, 1,000 mg, Intravenous, Once  chlorhexidine, 15 " mL, Mouth/Throat, Q12H  enoxaparin, 40 mg, Subcutaneous, Daily  gabapentin, 100 mg, Oral, Q12H  guaiFENesin, 1,200 mg, Oral, Q12H  Lidocaine, 2 patch, Transdermal, Q24H  mupirocin, , Each Nare, BID  pantoprazole, 40 mg, Intravenous, Once   Followed by  pantoprazole, 40 mg, Oral, QAM  senna-docusate sodium, 2 tablet, Oral, Nightly      phenylephrine, 0.2-2 mcg/kg/min  sodium chloride, 30 mL/hr, Last Rate: 30 mL/hr (02/28/24 1156)      Assessment & Plan  - Ascending aortic aneurysm- s/p ascending aortic aneurysm repair- POD#5 Pagni  - liver cyst  - GERD  - hx of prostate CA  - post op anemia- expected acute blood loss    Feeling okay this morning, but does report mild shortness of breath. Diuretics held yesterday due to elevated BUN. Will repeat CXR this morning  Weaned to RA and tolerating  Remains in SR. Continue atenolol. Will reduce amiodarone to 200 mg BID. Check EKG to evaluate QTC  Mobilize/encourage pulmonary toilet  Possible discharge later today or tomorrow pending progress  Continue routine care    ADELINA Vasquez  03/04/24  07:46 EST

## 2024-03-04 NOTE — ANESTHESIA POSTPROCEDURE EVALUATION
"Patient: Nirav Szymanski    Procedure Summary       Date: 02/28/24 Room / Location: [place] [address] / [facility] CARDIOVASCULAR OPERATING ROOM    Anesthesia Start: 0651 Anesthesia Stop: 1129    Procedure: CELESTINE, STERNOTOMY, THORACIC AORTIC ANEURYSM REPAIR, CIRCULATORY ARREST, NEURO MONITORING, PRP (Chest) Diagnosis:       Aneurysm of ascending aorta without rupture      (Aneurysm of ascending aorta without rupture [I71.21])    Surgeons: John Akhtar MD Provider: Bret Nixon MD    Anesthesia Type: [place], CVL, PAC ASA Status: 4            Anesthesia Type: [place], CVL, PAC    Vitals  Vitals Value Taken Time   /60 03/04/24 1315   Temp 36.6 °C (97.9 °F) 03/04/24 0800   Pulse 56 03/04/24 1449   Resp 16 03/04/24 1315   SpO2 92 % 03/04/24 1449   Vitals shown include unfiled device data.        Post Anesthesia Care and Evaluation    Level of consciousness: awake and alert  Pain management: adequate    Airway patency: patent  Anesthetic complications: No anesthetic complications  PONV Status: none  Cardiovascular status: acceptable  Respiratory status: acceptable  Hydration status: acceptable    Comments: /60 (BP Location: Left arm, Patient Position: Sitting)   Pulse 61   Temp 36.6 °C (97.9 °F) (Oral)   Resp 16   Ht 167.6 cm (66\")   Wt 95.5 kg (210 lb 8 oz)   SpO2 94%   BMI 33.98 kg/m²       "

## 2024-03-04 NOTE — PROGRESS NOTES
LOS: 5 days   Patient Care Team:  Hai Bender MD as PCP - General (Internal Medicine)  Suhail Bojorquez MD as Consulting Physician (Cardiology)      Chief Complaint: Follow-up ascending aortic aneurysm, atrial fibrillation       Interval History: He is doing okay.  Seems a little puny this morning.  Reports shortness of breath but attributes it to his mouth being dry.      Objective   Vital Signs  Temp:  [97.8 °F (36.6 °C)-100.2 °F (37.9 °C)] 97.9 °F (36.6 °C)  Heart Rate:  [65-72] 67  Resp:  [16] 16  BP: (100-121)/(55-64) 113/61    Intake/Output Summary (Last 24 hours) at 3/4/2024 0959  Last data filed at 3/4/2024 0800  Gross per 24 hour   Intake 840 ml   Output 550 ml   Net 290 ml         Physical Exam:  Constitutional: Well appearing, well developed, no acute distress   HENT: Oropharynx clear and membrane moist  Eyes: Normal conjunctiva, no sclera icterus.  Neck: Supple, no carotid bruit bilaterally.  Cardiovascular: Regular rate and rhythm, No Murmur, No bilateral lower extremity edema.  Pulmonary: Normal respiratory effort, normal lung sounds, no wheezing.  Abdominal: Soft, nontender, no hepatosplenomegaly, liver is non-pulsatile.  Neurological: Alert and orient x 3.   Skin: Warm, dry, no ecchymosis, no rash.  Psych: Appropriate mood and affect. Normal judgment and insight.      Results Review:      Results from last 7 days   Lab Units 03/04/24  0339 03/03/24  2323 03/03/24  0506 03/02/24  0353   SODIUM mmol/L 136  --  135* 137   POTASSIUM mmol/L 4.1 4.0 3.9 4.1   CHLORIDE mmol/L 102  --  103 102   CO2 mmol/L 23.7  --  25.0 25.0   BUN mg/dL 34*  --  43* 37*   CREATININE mg/dL 1.16  --  1.19 1.15   GLUCOSE mg/dL 100*  --  97 109*   CALCIUM mg/dL 8.0*  --  8.2* 8.3*         Results from last 7 days   Lab Units 03/03/24  0506 03/02/24  0353 03/01/24  0430   WBC 10*3/mm3 9.41 11.37* 13.75*   HEMOGLOBIN g/dL 9.0* 9.4* 9.9*   HEMATOCRIT % 27.6* 28.7* 29.3*   PLATELETS 10*3/mm3 123* 121* 139*     Results  from last 7 days   Lab Units 02/29/24  0320 02/28/24  1125 02/28/24  1042   INR  1.30* 1.42* 1.57*  1.4*   APTT seconds  --  30.5 33.1         Results from last 7 days   Lab Units 02/29/24  0320   MAGNESIUM mg/dL 2.9*           I reviewed the patient's new clinical results.  I personally viewed and interpreted the patient's EKG/Telemetry data        Medication Review:   amiodarone, 200 mg, Oral, Q12H  aspirin, 81 mg, Oral, Daily  atenolol, 25 mg, Oral, Q12H  atorvastatin, 40 mg, Oral, Nightly  calcium gluconate, 1,000 mg, Intravenous, Once  chlorhexidine, 15 mL, Mouth/Throat, Q12H  enoxaparin, 40 mg, Subcutaneous, Daily  ferrous sulfate, 325 mg, Oral, Daily With Breakfast  gabapentin, 100 mg, Oral, Q12H  guaiFENesin, 1,200 mg, Oral, Q12H  Lidocaine, 2 patch, Transdermal, Q24H  multivitamin, 1 tablet, Oral, Daily  mupirocin, , Each Nare, BID  pantoprazole, 40 mg, Intravenous, Once   Followed by  pantoprazole, 40 mg, Oral, QAM  senna-docusate sodium, 2 tablet, Oral, Nightly        phenylephrine, 0.2-2 mcg/kg/min  sodium chloride, 30 mL/hr, Last Rate: 30 mL/hr (02/28/24 1156)        Assessment & Plan       Aneurysm of ascending aorta    Ascending aortic aneurysm      1.  Ascending aortic aneurysm.  Status post repair on 2/28.  2.  Postoperative atrial fibrillation.  Status post left atrial appendage ligation.  Maintaining sinus rhythm on amiodarone.  Rate controlled with atenolol.   3.  Postop anemia.  Start a multivitamin and iron.  4.  Shortness of breath.  Diuretics were held yesterday for an elevated BUN.  Chest x-ray today stable.  He is on room air.  proBNP added to his morning labs.       Migdalia Grijalva, APRN  03/04/24  09:59 EST

## 2024-03-04 NOTE — THERAPY TREATMENT NOTE
Patient Name: Nirav Szymanski  : 1950    MRN: [id number]                              Today's Date: 3/4/2024       Admit Date: 2024    Visit Dx:     ICD-10-CM ICD-9-CM   1. Aneurysm of ascending aorta without rupture  I71.21 441.2     Patient Active Problem List   Diagnosis    Right flank pain    Vitamin D deficiency    Vasomotor rhinitis    Spinal stenosis of lumbar region    Liver cyst    History of temporal arteritis    GERD (gastroesophageal reflux disease)    DDD (degenerative disc disease), lumbar    Aneurysm of ascending aorta    Allergic rhinitis    Adenomatous polyp of colon    Hernia of abdominal wall    Abnormal findings on diagnostic imaging of other specified body structures    Ascending aortic aneurysm     Past Medical History:   Diagnosis Date    Ascending aortic aneurysm     5.3 PER CARDIOLOGY NOTE    Colon polyps     GERD (gastroesophageal reflux disease)     History of prostate cancer     PONV (postoperative nausea and vomiting)      Past Surgical History:   Procedure Laterality Date    ASCENDING AORTIC ANEURYSM REPAIR W/ MECHANICAL AORTIC VALVE REPLACEMENT N/A 2024    Procedure: CELESTINE, STERNOTOMY, THORACIC AORTIC ANEURYSM REPAIR, CIRCULATORY ARREST, NEURO MONITORING, PRP;  Surgeon: John Akhtar MD;  Location: [facility] CVOR;  Service: Cardiothoracic;  Laterality: N/A;    CARDIAC CATHETERIZATION N/A 2024    Procedure: Left Heart Cath;  Surgeon: Suhail Bojorquez MD;  Location: [facility] CATH INVASIVE LOCATION;  Service: Cardiology;  Laterality: N/A;    CARDIAC CATHETERIZATION N/A 2024    Procedure: Coronary angiography;  Surgeon: Suhail Bojorquez MD;  Location: [facility] CATH INVASIVE LOCATION;  Service: Cardiology;  Laterality: N/A;    COLONOSCOPY      COLONOSCOPY N/A 2024    Procedure: COLONOSCOPY to cecum and into the terminal ileum with cold snare polypectomies;  Surgeon: Lexx Hill MD;  Location: [facility] ENDOSCOPY;  Service: Gastroenterology;  Laterality:  N/A;  Pre: screening, hx polyps  Post: external hemorrhoids, polyps, tattoo, diverticulosis    INGUINAL HERNIA REPAIR Right     NASAL POLYP EXCISION      PROSTATECTOMY  2004    ROTATOR CUFF REPAIR Left     2005    SINUS SURGERY      TESTICLE SURGERY      CHILDHOOD      General Information       Row Name 03/04/24 0926          Physical Therapy Time and Intention    Document Type therapy note (daily note)  -MS     Mode of Treatment individual therapy;physical therapy  -MS       Row Name 03/04/24 0926          General Information    Existing Precautions/Restrictions cardiac;sternal;fall  -MS       Row Name 03/04/24 0926          Cognition    Orientation Status (Cognition) oriented x 3  -MS       Row Name 03/04/24 0926          Safety Issues, Functional Mobility    Impairments Affecting Function (Mobility) endurance/activity tolerance;shortness of breath;strength;pain  -MS               User Key  (r) = Recorded By, (t) = Taken By, (c) = Cosigned By      Initials Name Provider Type    Cata Gunn TOSHIA, PT Physical Therapist                   Mobility       Row Name 03/04/24 0926          Bed Mobility    Bed Mobility scooting/bridging  -MS     Scooting/Bridging Stanley (Bed Mobility) standby assist;verbal cues  -MS     Sit-Supine Stanley (Bed Mobility) standby assist;verbal cues  -MS     Comment, (Bed Mobility) UIC upon PT arrival.  -MS       Row Name 03/04/24 0926          Transfers    Comment, (Transfers) Sequencing and hand placement cues.  -MS       Row Name 03/04/24 0926          Sit-Stand Transfer    Sit-Stand Stanley (Transfers) standby assist;verbal cues;nonverbal cues (demo/gesture)  -MS       Row Name 03/04/24 0926          Gait/Stairs (Locomotion)    Stanley Level (Gait) standby assist;contact guard;verbal cues;nonverbal cues (demo/gesture)  -MS     Assistive Device (Gait) --  no AD  -MS     Distance in Feet (Gait) 200'  -MS     Deviations/Abnormal Patterns (Gait) irene  decreased;festinating/shuffling;stride length decreased  -MS     Bilateral Gait Deviations forward flexed posture  -MS     Comment, (Gait/Stairs) No overt LOB or veering noted. Very slowed irene. Encouraged standing rest breaks but patient declined. Requested to return to bed post ambulation.  -MS               User Key  (r) = Recorded By, (t) = Taken By, (c) = Cosigned By      Initials Name Provider Type    MS VelasquezsCata, PT Physical Therapist                   Obj/Interventions       Row Name 03/04/24 0929          Motor Skills    Therapeutic Exercise --  cardiac protocol x 10 reps  -MS       Row Name 03/04/24 0929          Balance    Static Sitting Balance supervision  -MS     Dynamic Sitting Balance standby assist  -MS     Position, Sitting Balance unsupported;sitting edge of bed  -MS     Static Standing Balance standby assist  -MS     Dynamic Standing Balance contact guard  -MS     Position/Device Used, Standing Balance unsupported  -MS               User Key  (r) = Recorded By, (t) = Taken By, (c) = Cosigned By      Initials Name Provider Type    MS RonnaCata, PT Physical Therapist                   Goals/Plan    No documentation.                  Clinical Impression       Row Name 03/04/24 0929          Pain    Pretreatment Pain Rating 0/10 - no pain  -MS     Posttreatment Pain Rating 0/10 - no pain  -MS       Row Name 03/04/24 0929          Plan of Care Review    Plan of Care Reviewed With patient  -MS     Progress improving  -MS     Outcome Evaluation Patient pleasant and agreeable to PT- sitting UIC with spouse at bedside. SBA for bed mobility and ambulated 200' SBA-CGA without any AD. Slowed irene with shuffling gait though no overt LOB or veering noted. Reviewed energy conservation techniques. PT will begin follow peripherally based on current mobility status.  -MS       Row Name 03/04/24 0929          Therapy Assessment/Plan (PT)    Therapy Frequency (PT) 3 times/wk  -MS       Charlette  Name 03/04/24 0929          Vital Signs    Pretreatment Heart Rate (beats/min) 91  -MS     Pre SpO2 (%) 96  -MS     O2 Delivery Pre Treatment room air  -MS       Row Name 03/04/24 0929          Positioning and Restraints    Pre-Treatment Position sitting in chair/recliner  -MS     Post Treatment Position chair  -MS     In Bed fowlers;call light within reach;encouraged to call for assist;exit alarm on;with family/caregiver  -MS               User Key  (r) = Recorded By, (t) = Taken By, (c) = Cosigned By      Initials Name Provider Type    Cata Gunn, PT Physical Therapist                   Outcome Measures       Row Name 03/04/24 0936          How much help from another person do you currently need...    Turning from your back to your side while in flat bed without using bedrails? 4  -MS     Moving from lying on back to sitting on the side of a flat bed without bedrails? 3  -MS     Moving to and from a bed to a chair (including a wheelchair)? 3  -MS     Standing up from a chair using your arms (e.g., wheelchair, bedside chair)? 3  -MS     Climbing 3-5 steps with a railing? 3  -MS     To walk in hospital room? 3  -MS     AM-PAC 6 Clicks Score (PT) 19  -MS     Highest Level of Mobility Goal 6 --> Walk 10 steps or more  -MS               User Key  (r) = Recorded By, (t) = Taken By, (c) = Cosigned By      Initials Name Provider Type    Cata Gunn, PT Physical Therapist                                 Physical Therapy Education       Title: PT OT SLP Therapies (Done)       Topic: Physical Therapy (Done)       Point: Mobility training (Done)       Learning Progress Summary             Patient Acceptance, E,TB, MACK,DU,NR by  at 3/1/2024 1325    Acceptance, E,D, DU by PC at 2/29/2024 1039                         Point: Home exercise program (Done)       Learning Progress Summary             Patient Acceptance, E,TB, VU,DU,NR by  at 3/1/2024 1325    Acceptance, E,D, DU by PC at 2/29/2024 1039                          Point: Body mechanics (Done)       Learning Progress Summary             Patient Acceptance, E,TB, VU,DU,NR by  at 3/1/2024 1325    Acceptance, E,D, DU by  at 2/29/2024 1039                         Point: Precautions (Done)       Learning Progress Summary             Patient Acceptance, E,TB, VU,DU,NR by  at 3/1/2024 1325    Acceptance, E,D, DU by  at 2/29/2024 1039                                         User Key       Initials Effective Dates Name Provider Type Discipline    PC 06/16/21 -  Makayla Mac PT Physical Therapist PT    CS 09/22/22 -  Clover Quiroz PT Physical Therapist PT                  PT Recommendation and Plan     Plan of Care Reviewed With: patient  Progress: improving  Outcome Evaluation: Patient pleasant and agreeable to PT- sitting UIC with spouse at bedside. SBA for bed mobility and ambulated 200' SBA-CGA without any AD. Slowed irene with shuffling gait though no overt LOB or veering noted. Reviewed energy conservation techniques. PT will begin follow peripherally based on current mobility status.     Time Calculation:         PT Charges       Row Name 03/04/24 0924             Time Calculation    Start Time 0813  -MS      Stop Time 0836  -MS      Time Calculation (min) 23 min  -MS      PT Received On 03/04/24  -MS      PT - Next Appointment 03/05/24  -MS                User Key  (r) = Recorded By, (t) = Taken By, (c) = Cosigned By      Initials Name Provider Type    MS VelasquezsCata, PT Physical Therapist                  Therapy Charges for Today       Code Description Service Date Service Provider Modifiers Qty    28179665735 HC PT THER PROC EA 15 MIN 3/4/2024 Cata Friend, PT GP 2            PT G-Codes  Outcome Measure Options: AM-PAC 6 Clicks Basic Mobility (PT)  AM-PAC 6 Clicks Score (PT): 19  PT Discharge Summary  Anticipated Discharge Disposition (PT): home with assist, home with home health    Cata Friend PT  3/4/2024

## 2024-03-05 ENCOUNTER — READMISSION MANAGEMENT (OUTPATIENT)
Dept: CALL CENTER | Facility: HOSPITAL | Age: 74
End: 2024-03-05
Payer: MEDICARE

## 2024-03-05 ENCOUNTER — DOCUMENTATION (OUTPATIENT)
Dept: HOME HEALTH SERVICES | Facility: HOME HEALTHCARE | Age: 74
End: 2024-03-05
Payer: MEDICARE

## 2024-03-05 ENCOUNTER — HOME HEALTH ADMISSION (OUTPATIENT)
Dept: HOME HEALTH SERVICES | Facility: HOME HEALTHCARE | Age: 74
End: 2024-03-05
Payer: MEDICARE

## 2024-03-05 VITALS
BODY MASS INDEX: 34.2 KG/M2 | HEART RATE: 77 BPM | DIASTOLIC BLOOD PRESSURE: 61 MMHG | RESPIRATION RATE: 16 BRPM | TEMPERATURE: 98.3 F | WEIGHT: 212.8 LBS | OXYGEN SATURATION: 92 % | HEIGHT: 66 IN | SYSTOLIC BLOOD PRESSURE: 109 MMHG

## 2024-03-05 LAB
ANION GAP SERPL CALCULATED.3IONS-SCNC: 13 MMOL/L (ref 5–15)
BH BB BLOOD EXPIRATION DATE: NORMAL
BH BB BLOOD EXPIRATION DATE: NORMAL
BH BB BLOOD TYPE BARCODE: 600
BH BB BLOOD TYPE BARCODE: 600
BH BB DISPENSE STATUS: NORMAL
BH BB DISPENSE STATUS: NORMAL
BH BB PRODUCT CODE: NORMAL
BH BB PRODUCT CODE: NORMAL
BH BB UNIT NUMBER: NORMAL
BH BB UNIT NUMBER: NORMAL
BUN SERPL-MCNC: 26 MG/DL (ref 8–23)
BUN/CREAT SERPL: 23.4 (ref 7–25)
CALCIUM SPEC-SCNC: 7.8 MG/DL (ref 8.6–10.5)
CHLORIDE SERPL-SCNC: 100 MMOL/L (ref 98–107)
CO2 SERPL-SCNC: 23 MMOL/L (ref 22–29)
CREAT SERPL-MCNC: 1.11 MG/DL (ref 0.76–1.27)
CROSSMATCH INTERPRETATION: NORMAL
CROSSMATCH INTERPRETATION: NORMAL
EGFRCR SERPLBLD CKD-EPI 2021: 68.8 ML/MIN/1.73
GLUCOSE SERPL-MCNC: 98 MG/DL (ref 65–99)
POTASSIUM SERPL-SCNC: 3.8 MMOL/L (ref 3.5–5.2)
SODIUM SERPL-SCNC: 136 MMOL/L (ref 136–145)
UNIT  ABO: NORMAL
UNIT  ABO: NORMAL
UNIT  RH: NORMAL
UNIT  RH: NORMAL

## 2024-03-05 PROCEDURE — 94799 UNLISTED PULMONARY SVC/PX: CPT

## 2024-03-05 PROCEDURE — 94760 N-INVAS EAR/PLS OXIMETRY 1: CPT

## 2024-03-05 PROCEDURE — 99232 SBSQ HOSP IP/OBS MODERATE 35: CPT | Performed by: NURSE PRACTITIONER

## 2024-03-05 PROCEDURE — 80048 BASIC METABOLIC PNL TOTAL CA: CPT | Performed by: NURSE PRACTITIONER

## 2024-03-05 PROCEDURE — 94761 N-INVAS EAR/PLS OXIMETRY MLT: CPT

## 2024-03-05 RX ORDER — FERROUS SULFATE 325(65) MG
325 TABLET ORAL
Qty: 30 TABLET | Refills: 1 | Status: SHIPPED | OUTPATIENT
Start: 2024-03-05 | End: 2024-05-04

## 2024-03-05 RX ORDER — FUROSEMIDE 40 MG/1
40 TABLET ORAL DAILY
Qty: 30 TABLET | Refills: 0 | Status: SHIPPED | OUTPATIENT
Start: 2024-03-05 | End: 2024-03-12

## 2024-03-05 RX ORDER — AMIODARONE HYDROCHLORIDE 200 MG/1
200 TABLET ORAL
Qty: 30 TABLET | Refills: 0 | Status: SHIPPED | OUTPATIENT
Start: 2024-03-06

## 2024-03-05 RX ORDER — ATENOLOL 25 MG/1
25 TABLET ORAL EVERY 12 HOURS SCHEDULED
Qty: 60 TABLET | Refills: 2 | Status: SHIPPED | OUTPATIENT
Start: 2024-03-05 | End: 2024-06-03

## 2024-03-05 RX ORDER — AMIODARONE HYDROCHLORIDE 200 MG/1
200 TABLET ORAL
Status: DISCONTINUED | OUTPATIENT
Start: 2024-03-06 | End: 2024-03-05 | Stop reason: HOSPADM

## 2024-03-05 RX ORDER — POTASSIUM CHLORIDE 20 MEQ/1
20 TABLET, EXTENDED RELEASE ORAL DAILY
Qty: 30 TABLET | Refills: 0 | Status: SHIPPED | OUTPATIENT
Start: 2024-03-05 | End: 2024-03-12

## 2024-03-05 RX ORDER — GABAPENTIN 100 MG/1
100 CAPSULE ORAL EVERY 12 HOURS SCHEDULED
Qty: 14 CAPSULE | Refills: 0 | Status: SHIPPED | OUTPATIENT
Start: 2024-03-05 | End: 2024-03-25

## 2024-03-05 RX ORDER — FUROSEMIDE 40 MG/1
40 TABLET ORAL DAILY
Status: DISCONTINUED | OUTPATIENT
Start: 2024-03-05 | End: 2024-03-05 | Stop reason: HOSPADM

## 2024-03-05 RX ORDER — POTASSIUM CHLORIDE 750 MG/1
20 TABLET, FILM COATED, EXTENDED RELEASE ORAL ONCE
Status: COMPLETED | OUTPATIENT
Start: 2024-03-05 | End: 2024-03-05

## 2024-03-05 RX ORDER — POTASSIUM CHLORIDE 750 MG/1
20 TABLET, FILM COATED, EXTENDED RELEASE ORAL DAILY
Status: DISCONTINUED | OUTPATIENT
Start: 2024-03-05 | End: 2024-03-05 | Stop reason: HOSPADM

## 2024-03-05 RX ORDER — HYDROCODONE BITARTRATE AND ACETAMINOPHEN 5; 325 MG/1; MG/1
1 TABLET ORAL EVERY 4 HOURS PRN
Qty: 42 TABLET | Refills: 0 | Status: SHIPPED | OUTPATIENT
Start: 2024-03-05 | End: 2024-03-11

## 2024-03-05 RX ADMIN — Medication 1 TABLET: at 09:13

## 2024-03-05 RX ADMIN — ATENOLOL 25 MG: 25 TABLET ORAL at 09:13

## 2024-03-05 RX ADMIN — HYDROCODONE BITARTRATE AND ACETAMINOPHEN 1 TABLET: 5; 325 TABLET ORAL at 11:17

## 2024-03-05 RX ADMIN — MUPIROCIN 1 APPLICATION: 20 OINTMENT TOPICAL at 09:22

## 2024-03-05 RX ADMIN — FUROSEMIDE 40 MG: 40 TABLET ORAL at 09:13

## 2024-03-05 RX ADMIN — POTASSIUM CHLORIDE 20 MEQ: 750 TABLET, EXTENDED RELEASE ORAL at 05:57

## 2024-03-05 RX ADMIN — ASPIRIN 81 MG: 81 TABLET, COATED ORAL at 09:13

## 2024-03-05 RX ADMIN — FERROUS SULFATE TAB 325 MG (65 MG ELEMENTAL FE) 325 MG: 325 (65 FE) TAB at 09:14

## 2024-03-05 RX ADMIN — GABAPENTIN 100 MG: 100 CAPSULE ORAL at 09:13

## 2024-03-05 RX ADMIN — GUAIFENESIN 1200 MG: 600 TABLET, EXTENDED RELEASE ORAL at 09:13

## 2024-03-05 RX ADMIN — PANTOPRAZOLE SODIUM 40 MG: 40 TABLET, DELAYED RELEASE ORAL at 05:56

## 2024-03-05 RX ADMIN — LIDOCAINE 2 PATCH: 4 PATCH TOPICAL at 09:19

## 2024-03-05 RX ADMIN — POTASSIUM CHLORIDE 20 MEQ: 750 TABLET, EXTENDED RELEASE ORAL at 09:13

## 2024-03-05 NOTE — OUTREACH NOTE
Prep Survey      Flowsheet Row Responses   [facility] facility patient discharged from? [place]   Is LACE score < 7 ? No   Eligibility [facility]   Date of Admission 02/28/24   Date of Discharge 03/05/24   Discharge Disposition Home-Health Care Svc   Discharge diagnosis Aneuysm of ascending aorta-repair this visit   Does the patient have one of the following disease processes/diagnoses(primary or secondary)? Cardiothoracic surgery   Does the patient have Home health ordered? Yes   What is the Home health agency?  [facility]   Is there a DME ordered? No   Prep survey completed? Yes            MARTIN POPE - Registered Nurse

## 2024-03-05 NOTE — PLAN OF CARE
Goal Outcome Evaluation:           Progress: improving  Outcome Evaluation: Pt POD5 AAA. VSS, pt A&Ox4, standby assist, up to toliet. Pt ambulated around unit 3x today. Will update POC as needed.

## 2024-03-05 NOTE — CASE MANAGEMENT/SOCIAL WORK
Case Management Discharge Note      Final Note: home with HH    Provided Post Acute Provider List?: Yes  Post Acute Provider List: Home Health  Delivered To: Patient  Method of Delivery: In person    Selected Continued Care - Discharged on 3/5/2024 Admission date: 2/28/2024 - Discharge disposition: Home-Health Care c      Destination    No services have been selected for the patient.                Durable Medical Equipment    No services have been selected for the patient.                Dialysis/Infusion    No services have been selected for the patient.                Home Medical Care Coordination complete.      Service Provider Selected Services Address Phone Fax Patient Preferred    [facility] [address] [address] [phone] --              Therapy    No services have been selected for the patient.                Community Resources    No services have been selected for the patient.                Community & DME    No services have been selected for the patient.                    Transportation Services  Private: Car    Final Discharge Disposition Code: 06 - home with home health care

## 2024-03-05 NOTE — PROGRESS NOTES
[facility] Clinical Pharmacy Services: Patient Counseling Consult    Nirav Scales has been counseled on the following medications: amiodarone, atenolol, ferrous sulfate, furosemide, gabapentin, Norco, and potassium. Counseling points included the following:    Explained indication of each medication, and patient's need for these medications.  Went over dosing and frequency of each medication.  Discussed any special administration, storage, or monitoring instructions with medications.  Discussed all important drug interactions, including over-the-counter medications and supplements.  Explained possible side effects for each medication.  Instructed the patient not to begin or discontinue any medications without informing his/her physician/pharmacist.  Talked about keeping a week-long blood pressure journal to bring to her next appointment with the nurse practitioner. Spoke to the importance of taking blood pressure the same time every day.     Patient expressed understanding and had no further questions.      Alana Herrera, Pharm.D., [facility]   Clinical Pharmacist  Phone Extension #[phone]

## 2024-03-05 NOTE — PROGRESS NOTES
[facility] give referral for home health needs.  Noted referral in epic per Dr Akhtar.  Spoke with wife Dinora and verified all info, primary contact is patient's cell, second is wife, Dinora's cell.  D/C planned for today.

## 2024-03-05 NOTE — DISCHARGE SUMMARY
[facility] Cardiac Surgery Discharge Summary    Date of Admission: 2/28/2024  Date of Discharge:  3/5/2024    Discharge Diagnosis:   - Ascending aortic aneurysm- s/p ascending aortic aneurysm repair- (Dr. Akhtar)  - liver cyst  - GERD  - hx of prostate CA  - post op anemia- expected acute blood loss, stable    Presenting Problem/History of Present Illness  Aneurysm of ascending aorta without rupture [I71.21]  Ascending aortic aneurysm [I71.21]     Hospital Course  Patient is a 74 y.o. male presented with above medical history. After having appropriate pre-operative workup he was scheduled for surgery. He was admitted the morning of the procedure. 2/28/2024 he underwent ascending aortic aneurysm repair/hemiarch/DENEEN clip by Dr. Akhtar. He tolerated the procedure and was transferred to [facility]. Full details can be found in operative report. He remained stable and was able to be extubated shortly after surgery on POD0. He continued to do well and was able to be transferred to the stepdown unit on POD1. He continued to progress with increased activity, diuresis, and weaning of supplemental oxygen. On POD2 he had an episode of afib with RVR. He was started on IV amio. He converted to sinus rhythm and was eventually transitioned to PO amio. On POD3 chest tubes and rodriguez were removed. Epicardial wires were removed without issue on POD5. He was ready for discharge on POD6. Plan discharge home with family and home health. At time of discharge he is HD stable, ambulating well, and on room air.  Patient was provided with appropriate discharge education. He was instructed to call office with any questions or concerns.       Procedures Performed  Procedure(s):  CELESTINE, STERNOTOMY, THORACIC AORTIC ANEURYSM REPAIR, CIRCULATORY ARREST, NEURO MONITORING, PRP    Operative Note - Dr. Akhtar     Date of Dictation: 03/01/24     Date of Procedure: 02/28/2024     Referring Physician: Suhail Bojorquez MD     Preoperative diagnosis:  1.  5.3 cm ascending  aortic aneurysm  2.  Proximal arch aneurysmal dilatation  3.  Hypertension     Postoperative diagnosis:  Same     Procedure:   1. Ascending aortic aneurysm repair with a 32 mm Terumo Gelwave Dacron interposition graft (CPT code 75410)  2.  Proximal aortic arch replacement with the same graft in a hemiarch anastomosis configuration (CPT code 94566)  3.  Comprehensive neuro monitoring  4.  Left atrial appendage obliteration with a 40 mm atrial clip device (CPT code 07796)       Consults:   Consults       Date and Time Order Name Status Description    2/28/2024 11:23 AM Inpatient Cardiology Consult Completed         Cardiology - Dr. Bojorquez    Pertinent Test Results:    Lab Results   Component Value Date    WBC 9.41 03/03/2024    HGB 9.0 (L) 03/03/2024    HCT 27.6 (L) 03/03/2024    MCV 91.4 03/03/2024     (L) 03/03/2024      Lab Results   Component Value Date    GLUCOSE 98 03/05/2024    CALCIUM 7.8 (L) 03/05/2024     03/05/2024    K 3.8 03/05/2024    CO2 23.0 03/05/2024     03/05/2024    BUN 26 (H) 03/05/2024    CREATININE 1.11 03/05/2024    BCR 23.4 03/05/2024    ANIONGAP 13.0 03/05/2024     Lab Results   Component Value Date    INR 1.30 (H) 02/29/2024    PROTIME 16.4 (H) 02/29/2024         Condition on Discharge:    stable    Vital Signs  Temp:  [98.3 °F (36.8 °C)-100.6 °F (38.1 °C)] 98.3 °F (36.8 °C)  Heart Rate:  [57-75] 73  Resp:  [16] 16  BP: ()/(43-72) 109/61      Discharge Disposition  Home-Health Care Svc    Discharge Medications     Discharge Medications        New Medications        Instructions Start Date   amiodarone 200 MG tablet  Commonly known as: PACERONE   200 mg, Oral, Every 24 Hours Scheduled   Start Date: March 6, 2024     atenolol 25 MG tablet  Commonly known as: TENORMIN   25 mg, Oral, Every 12 Hours Scheduled      ferrous sulfate 325 (65 FE) MG tablet   325 mg, Oral, Daily With Breakfast      furosemide 40 MG tablet  Commonly known as: LASIX   40 mg, Oral, Daily       gabapentin 100 MG capsule  Commonly known as: NEURONTIN   100 mg, Oral, Every 12 Hours Scheduled      HYDROcodone-acetaminophen 5-325 MG per tablet  Commonly known as: NORCO   1 tablet, Oral, Every 4 Hours PRN      potassium chloride 20 MEQ CR tablet  Commonly known as: K-DUR,KLOR-CON   20 mEq, Oral, Daily             Continue These Medications        Instructions Start Date   aspirin 81 MG EC tablet   81 mg, Oral, Daily, HELD FOR OR      docusate sodium 250 MG capsule  Commonly known as: COLACE   250 mg, Oral, Daily      famotidine 20 MG tablet  Commonly known as: PEPCID   20 mg, Oral, 2 Times Daily      fexofenadine 180 MG tablet  Commonly known as: ALLEGRA   180 mg, Oral, Nightly      ipratropium 0.03 % nasal spray  Commonly known as: ATROVENT   2 sprays, Nasal, As Needed      Magnesium 400 MG tablet   400 mg, Oral, Daily, HELD FOR OR      QC TUMERIC COMPLEX PO   1 tablet, Oral, Daily, HELD FOR OR      VITAMIN B COMPLEX PO   1 tablet, Oral, Daily, HELD FOR OR      vitamin D3 125 MCG (5000 UT) capsule capsule   5,000 Units, Oral, Daily             Stop These Medications      chlorhexidine 0.12 % solution  Commonly known as: PERIDEX     mupirocin 2 % nasal ointment  Commonly known as: BACTROBAN              Discharge Diet:   Heart healthy    Activity at Discharge:   1. No driving for 2 weeks and off narcotic pain medications.  2. Shower daily. Clean incisions with warm water and antibacterial soap only. Do not put any lotion or ointments on incisions.  3. Ambulate for 10 minutes at least 3 times a day.  4. No heavy lifting > 10lbs until seen in office.   5. Take all medications as prescribed.     Follow-up Appointments  Future Appointments   Date Time Provider Department Center   3/11/2024  3:00 PM Hai Bender MD [facility]   3/12/2024 12:30 PM Migdalia Grijalva APRN [facility]   4/2/2024  1:00 PM Amrita Lin APRN [facility]   4/9/2024  2:20 PM Suhail Bojorquez MD [facility]  [facility]     Additional Instructions for the Follow-ups that You Need to Schedule       Ambulatory Referral to Home Health   As directed      Face to Face Visit Date: 3/5/2024   Follow-up provider for Plan of Care?: I will be treating the patient on an ongoing basis.  Please send me the Plan of Care for signature.   Follow-up provider: CYNTHIA ALLEN [[id number]]   Reason/Clinical Findings: post op open heart   Describe mobility limitations that make leaving home difficult: weakness   Nursing/Therapeutic Services Requested: Skilled Nursing   Skilled nursing orders: Post CABG care   Frequency: 1 Week 1        Call MD With Problems / Concerns   As directed      Instructions:  Call office at [phone] for any drainage, increased redness, or fever over 100.5    Order Comments: Instructions:  Call office at [phone] for any drainage, increased redness, or fever over 100.5         Discharge Follow-up with PCP   As directed       Currently Documented PCP:    Hai Bender MD    PCP Phone Number:    [phone]     Follow Up Details: in 1 week        Discharge Follow-up with Specialty: Cardiologist APRN/PA; 1 Week   As directed      Specialty: Cardiologist APRN/PA   Follow Up: 1 Week   Follow Up Details: bring all prescription bottles to appointment, call for appointment        Discharge Follow-up with Specified Provider: Cardiologist; 1 Month   As directed      To: Cardiologist   Follow Up: 1 Month   Follow Up Details: call for appointment, bring all medication bottles to appointment        Discharge Follow-up with Specified Provider: Amrita Nye CT surgery APRN; 1 Month   As directed      To: Amrita Nye CT surgery APRN   Follow Up: 1 Month   Follow Up Details: 4-6 weeks, bring all current medications to appointment              Test Results Pending at Discharge  none

## 2024-03-05 NOTE — NURSING NOTE
Outcome evaluation: PT POD6 AAA. VSS, pt AO4, standby assist to toilet. Patient walked x1 around the unit. MEETA

## 2024-03-05 NOTE — PROGRESS NOTES
LOS: 6 days   Patient Care Team:  Hai Bender MD as PCP - General (Internal Medicine)  Suhail Bojorquez MD as Consulting Physician (Cardiology)      Chief Complaint: Follow-up ascending aortic aneurysm, atrial fibrillation       Interval History: No change. Breathing feels the same.       Objective   Vital Signs  Temp:  [98.3 °F (36.8 °C)-100.6 °F (38.1 °C)] 100.6 °F (38.1 °C)  Heart Rate:  [57-75] 67  Resp:  [16] 16  BP: ()/(43-72) 96/59    Intake/Output Summary (Last 24 hours) at 3/5/2024 0804  Last data filed at 3/5/2024 0600  Gross per 24 hour   Intake 360 ml   Output 1680 ml   Net -1320 ml         Physical Exam:  Constitutional: Well appearing, well developed, no acute distress   HENT: Oropharynx clear and membrane moist  Eyes: Normal conjunctiva, no sclera icterus.  Neck: Supple, no carotid bruit bilaterally.  Cardiovascular: Regular rate and rhythm, No Murmur, No bilateral lower extremity edema.  Pulmonary: Normal respiratory effort, normal lung sounds, no wheezing.  Abdominal: Soft, nontender, no hepatosplenomegaly, liver is non-pulsatile.  Neurological: Alert and orient x 3.   Skin: Warm, dry, no ecchymosis, no rash.  Psych: Appropriate mood and affect. Normal judgment and insight.      Results Review:      Results from last 7 days   Lab Units 03/05/24  0311 03/04/24  0339 03/03/24  2323 03/03/24  0506   SODIUM mmol/L 136 136  --  135*   POTASSIUM mmol/L 3.8 4.1 4.0 3.9   CHLORIDE mmol/L 100 102  --  103   CO2 mmol/L 23.0 23.7  --  25.0   BUN mg/dL 26* 34*  --  43*   CREATININE mg/dL 1.11 1.16  --  1.19   GLUCOSE mg/dL 98 100*  --  97   CALCIUM mg/dL 7.8* 8.0*  --  8.2*         Results from last 7 days   Lab Units 03/03/24  0506 03/02/24  0353 03/01/24  0430   WBC 10*3/mm3 9.41 11.37* 13.75*   HEMOGLOBIN g/dL 9.0* 9.4* 9.9*   HEMATOCRIT % 27.6* 28.7* 29.3*   PLATELETS 10*3/mm3 123* 121* 139*     Results from last 7 days   Lab Units 02/29/24  0320 02/28/24  1125 02/28/24  1042   INR   1.30* 1.42* 1.57*  1.4*   APTT seconds  --  30.5 33.1         Results from last 7 days   Lab Units 02/29/24  0320   MAGNESIUM mg/dL 2.9*           I reviewed the patient's new clinical results.  I personally viewed and interpreted the patient's EKG/Telemetry data        Medication Review:   [START ON 3/6/2024] amiodarone, 200 mg, Oral, Q24H  aspirin, 81 mg, Oral, Daily  atenolol, 25 mg, Oral, Q12H  atorvastatin, 40 mg, Oral, Nightly  chlorhexidine, 15 mL, Mouth/Throat, Q12H  enoxaparin, 40 mg, Subcutaneous, Daily  ferrous sulfate, 325 mg, Oral, Daily With Breakfast  furosemide, 40 mg, Oral, Daily  gabapentin, 100 mg, Oral, Q12H  guaiFENesin, 1,200 mg, Oral, Q12H  Lidocaine, 2 patch, Transdermal, Q24H  multivitamin, 1 tablet, Oral, Daily  mupirocin, , Each Nare, BID  pantoprazole, 40 mg, Intravenous, Once   Followed by  pantoprazole, 40 mg, Oral, QAM  potassium chloride, 20 mEq, Oral, Daily  senna-docusate sodium, 2 tablet, Oral, Nightly        phenylephrine, 0.2-2 mcg/kg/min  sodium chloride, 30 mL/hr, Last Rate: 30 mL/hr (02/28/24 1156)        Assessment & Plan       Aneurysm of ascending aorta    Ascending aortic aneurysm      1.  Ascending aortic aneurysm.  Status post repair on 2/28.  2.  Postoperative atrial fibrillation.  Status post left atrial appendage ligation.  Maintaining sinus rhythm on amiodarone-decreased to once daily.  Rate controlled with atenolol.   3.  Postop anemia.  Stable.   4.  Shortness of breath.  He was given a one time dose of IV lasix yesterday without much improvement.  Euvolemic on exam.  Remains on room air.  Begin oral Lasix today.     -From our standpoint, he is stable for discharge.  He will follow-up with me in 1 week and with Dr. Bojorquez in 1 month.      Migdalia Grijalva, ADELINA  03/05/24  08:04 EST

## 2024-03-06 ENCOUNTER — TRANSITIONAL CARE MANAGEMENT TELEPHONE ENCOUNTER (OUTPATIENT)
Dept: CALL CENTER | Facility: HOSPITAL | Age: 74
End: 2024-03-06
Payer: MEDICARE

## 2024-03-06 NOTE — OUTREACH NOTE
Call Center TCM Note      Flowsheet Row Responses   [facility] patient discharged from? [place]   Does the patient have one of the following disease processes/diagnoses(primary or secondary)? Cardiothoracic surgery   TCM attempt successful? Yes   Call start time 1428   Call end time 1434   Discharge diagnosis Aneuysm of ascending aorta-repair this visit   Meds reviewed with patient/caregiver? Yes   Is the patient having any side effects they believe may be caused by any medication additions or changes? No   Does the patient have all medications related to this admission filled (includes all antibiotics, pain medications, cardiac medications, etc.) Yes   Is the patient taking all medications as directed (includes completed medication regime)? Yes   Comments TCM APPT WITH PCP DR DOUGLAS IS 03/11/2024   Does the patient have an appointment with their PCP within 7-14 days of discharge? Yes   What is the Home health agency?  [facility]   Has home health visited the patient within 72 hours of discharge? Yes   Psychosocial issues? No   Did the patient receive a copy of their discharge instructions? Yes   Nursing interventions Reviewed instructions with patient   What is the patient's perception of their health status since discharge? Improving   Nursing interventions Nurse provided patient education   Is the patient/caregiver able to teach back normal signs of recovery? Nausea and lack of appetite, Constipation, Depression or irritability, Pain or discomfort at incisional site  [Pt denies any of these issues]   Nursing interventions Reassured on normal signs of recovery   Is the patient /caregiver able to teach back basic post-op care? Continue use of incentive spirometry at least 1 week post discharge, Hold pillow to support chest when coughing, Shower daily, Use a clean wash cloth and antibacterial bar or liquid soap to clean incisions, Lifting as instructed by MD in discharge instructions, If the steri-strips are  falling off, it is okay to remove them. (If applicable), No tub bath, swimming, or hot tub until instructed by MD, Drive as instructed by MD in discharge instructions, Practice cough and deep breath every 4 hours while awake   Is the patient/caregiver able to teach back signs and symptoms of incisional infection? Increased redness, swelling or pain at the incisonal site, Pus or odor from incision, Fever, Increased drainage or bleeding, Incisional warmth   Is the patient/caregiver able to teach back steps to recovery at home? Set small, achievable goals for return to baseline health, Practice good oral hygiene, Eat a well-balance diet, Rest and rebuild strength, gradually increase activity, Weigh daily, Make a list of questions for surgeon's appointment   Is the patient /caregiver able to teach back the importance of cardiac rehab? Yes   Nursing interventions Provided education on importance of cardiac rehab   If the patient is a current smoker, are they able to teach back resources for cessation? Not a smoker   Is the patient/caregiver able to teach back the hierarchy of who to call/visit for symptoms/problems? PCP, Specialist, Home health nurse, Urgent Care, ED, 911 Yes   TCM call completed? Yes   Wrap up additional comments D/C DX: TAVR - Pt feeling pretty well. Surgical sites look good. New rx's Amiodarone, Atenolol, Ferrous sulfate, Furosemide, Habapentin, Norco, K-DUR all in place. [facility] will be following pt. TCM APPT with PCP Dr Bender is 03/11, CARDIO HOSP FOLLOW UP 03/12, C/T SRGN POST OP is 04/02/2024.   Call end time 1434            Tamiko Gomez MA    3/6/2024, 14:38 EST

## 2024-03-07 ENCOUNTER — HOME CARE VISIT (OUTPATIENT)
Dept: HOME HEALTH SERVICES | Facility: HOME HEALTHCARE | Age: 74
End: 2024-03-07
Payer: MEDICARE

## 2024-03-07 VITALS
SYSTOLIC BLOOD PRESSURE: 112 MMHG | DIASTOLIC BLOOD PRESSURE: 60 MMHG | HEART RATE: 67 BPM | OXYGEN SATURATION: 93 % | TEMPERATURE: 98.9 F | BODY MASS INDEX: 33.09 KG/M2 | RESPIRATION RATE: 18 BRPM | WEIGHT: 205 LBS

## 2024-03-07 PROCEDURE — G0299 HHS/HOSPICE OF RN EA 15 MIN: HCPCS

## 2024-03-07 NOTE — HOME HEALTH
"SOC Note:  Patient is 74 year old that has a planned admission for CABG, aneurysm repaired. AOX4, with Vital Signs stable during SOC, lives at home with wife 1 [facility], with family assistance near by. Patient is aware of all restrictions, states \"I just want to get feeling better\" No Cardiac Rehab ordered post surgery, Patient interested in PT coming into the home states he has had left shoulder pain in the past and post surgery he is having pain with movement, with slight decrease ROM, I have placed PT referral, and MD notified. Patient is compliant with all medications, he is motivated to get off medications when possible as prior to this hospitalization patient was not taking many medication he reports.     Home Health ordered for: disciplines: SN, PT    Reason for Hosp/Primary Dx/Co-morbidities: CABG    Focus of Care: CABG    Patient's goal(s): \"to get better\"     Current Functional status/mobility/DME: Rolator pillow for splinting, flutter valve, IS, shower chair,     HB status/Living Arrangements: lives at home with wife, 1 story [place]     Skin Integrity/wound status: midsternum incision.     Code Status: FULL CODE     Fall Risk/Safety concerns: YES    Medication issues/Concerns: Patient has multiple new medications     Additional Problems/Concerns: Patient reports that he does not have cardiac rehab scheduled, and that cardiology discussed they would refer him at his next appointment if needed.     Plan for next visit: CP Assessment, wound assessment."

## 2024-03-07 NOTE — Clinical Note
Hello, I am verifying that you will be signing off on Home Health skilled nursing orders.   Thank you   Randa LI   [phone]

## 2024-03-08 ENCOUNTER — HOME CARE VISIT (OUTPATIENT)
Dept: HOME HEALTH SERVICES | Facility: HOME HEALTHCARE | Age: 74
End: 2024-03-08
Payer: MEDICARE

## 2024-03-08 PROCEDURE — G0151 HHCP-SERV OF PT,EA 15 MIN: HCPCS

## 2024-03-11 ENCOUNTER — HOME CARE VISIT (OUTPATIENT)
Dept: HOME HEALTH SERVICES | Facility: HOME HEALTHCARE | Age: 74
End: 2024-03-11
Payer: MEDICARE

## 2024-03-11 ENCOUNTER — OFFICE VISIT (OUTPATIENT)
Dept: FAMILY MEDICINE CLINIC | Facility: CLINIC | Age: 74
End: 2024-03-11
Payer: MEDICARE

## 2024-03-11 VITALS
TEMPERATURE: 98.3 F | OXYGEN SATURATION: 98 % | HEART RATE: 67 BPM | RESPIRATION RATE: 18 BRPM | WEIGHT: 201.9 LBS | BODY MASS INDEX: 32.59 KG/M2 | DIASTOLIC BLOOD PRESSURE: 60 MMHG | SYSTOLIC BLOOD PRESSURE: 115 MMHG

## 2024-03-11 VITALS — HEART RATE: 70 BPM | TEMPERATURE: 99.4 F | SYSTOLIC BLOOD PRESSURE: 106 MMHG | DIASTOLIC BLOOD PRESSURE: 60 MMHG

## 2024-03-11 VITALS
OXYGEN SATURATION: 96 % | BODY MASS INDEX: 32.43 KG/M2 | RESPIRATION RATE: 18 BRPM | SYSTOLIC BLOOD PRESSURE: 106 MMHG | WEIGHT: 201.8 LBS | DIASTOLIC BLOOD PRESSURE: 66 MMHG | HEART RATE: 63 BPM | HEIGHT: 66 IN

## 2024-03-11 VITALS — DIASTOLIC BLOOD PRESSURE: 60 MMHG | HEART RATE: 65 BPM | OXYGEN SATURATION: 98 % | SYSTOLIC BLOOD PRESSURE: 115 MMHG

## 2024-03-11 DIAGNOSIS — E83.51 HYPOCALCEMIA: ICD-10-CM

## 2024-03-11 DIAGNOSIS — D64.9 POSTOPERATIVE ANEMIA: Primary | ICD-10-CM

## 2024-03-11 PROCEDURE — G0299 HHS/HOSPICE OF RN EA 15 MIN: HCPCS

## 2024-03-11 PROCEDURE — G0151 HHCP-SERV OF PT,EA 15 MIN: HCPCS

## 2024-03-11 NOTE — HOME HEALTH
Patient was at [facility] from 2/28 to 3/5 for a scheduled AAA repair per Dr. Akhtar.  He has a supportive wife and a neighbor that is looking after him now.  He has normal post op abdominal and chest incisional pains, but also complaining of some left UE pains (mostly posterior shoulder and radicular pains to the elbow and into the hand).  This may have been some mild trauma of the brachial plexus with the surgery.  He has a history of left RTC repair 10 years ago, but it does not appear to be RTC related.  He states complete indpendence prior to this surgery.      Plan for next visit  Further assess left UE pain and advise   Progress gait and standing HEP.

## 2024-03-11 NOTE — PROGRESS NOTES
Transitional Care Follow Up Visit  Subjective     Nirav Szymanski is a 74 y.o. male who presents for a transitional care management visit.    Within 48 business hours after discharge our office contacted him via telephone to coordinate his care and needs.      I reviewed and discussed the details of that call along with the discharge summary, hospital problems, inpatient lab results, inpatient diagnostic studies, and consultation reports with Nirav.     Current outpatient and discharge medications have been reconciled for the patient.  Reviewed by: Hai Bender MD          3/5/2024     6:36 PM   Date of TCM Phone Call   [facility]   Date of Admission 2/28/2024   Date of Discharge 3/5/2024   [place] [facility]     Risk for Readmission (LACE) Score: 10 (3/5/2024  6:00 AM)      History of Present Illness   Course During Hospital Stay: Nirav is here today for follow-up.  He was admitted to [facility] on 28 February.  He was discharged on 5 March.  He had an ascending aortic aneurysm repair.  Postoperatively he did well.  He is doing well at home.  He is no longer requiring pain medication.  He is on iron supplement.  His postoperative hemoglobin did drop to about 9.  He did develop atrial fibrillation.  He required some IV amiodarone and is currently on that at home.  A lot of the medicines he is on now will likely be discontinued.     The following portions of the patient's history were reviewed and updated as appropriate: allergies, current medications, and problem list.    Review of Systems   HENT:  Positive for trouble swallowing.         He has had a little bit of trouble with choking on some saliva.   Respiratory:  Positive for cough. Negative for chest tightness and shortness of breath.      Objective   Physical Exam  Vitals and nursing note reviewed.   Constitutional:       Appearance: Normal appearance.   HENT:      Mouth/Throat:      Mouth: Mucous membranes are moist.       Pharynx: Oropharynx is clear. No oropharyngeal exudate or posterior oropharyngeal erythema.      Comments: He has symmetric palatal elevation  Neck:      Vascular: No carotid bruit.   Cardiovascular:      Rate and Rhythm: Normal rate and regular rhythm.      Heart sounds: No murmur heard.     No friction rub. No gallop.   Pulmonary:      Effort: Pulmonary effort is normal.      Breath sounds: No wheezing, rhonchi or rales.   Neurological:      Mental Status: He is alert.     Assessment & Plan   Diagnoses and all orders for this visit:    1. Postoperative anemia (Primary)  -     CBC & Differential  -     Iron Profile  -     Ferritin    2. Hypocalcemia  -     Comprehensive Metabolic Panel      Nirav is here today for follow-up after hospital visit.  He does have some postoperative anemia and he did have some hypocalcemia.  We are just going to follow-up on those.  Otherwise he can continue to follow with the cardiologist and cardiothoracic surgeon.

## 2024-03-11 NOTE — HOME HEALTH
.Routine Visit Note:  Pt AOX4, lungs clear blat, with pain with muscular pain in shoulder     Skill/education provided: CP Assessment with medications education on lasix, increase fluids.    Patient/caregiver response: patient understands teaches, wife in the home and room during HH visit.     Plan for next visit: CP assessment, discipline DC

## 2024-03-11 NOTE — HOME HEALTH
"Subjective: \"I'm doing better everyday.\"    Falls reported: none    Medication changes: none    Plan for next visit: Continue gait endurance/cardiac training following AAA repair with monitoring HR/O2 sats, reinstruct HEP with upgrades as tolerated, and patient spouse education as needed"

## 2024-03-12 ENCOUNTER — OFFICE VISIT (OUTPATIENT)
Dept: CARDIOLOGY | Facility: CLINIC | Age: 74
End: 2024-03-12
Payer: MEDICARE

## 2024-03-12 VITALS
DIASTOLIC BLOOD PRESSURE: 64 MMHG | BODY MASS INDEX: 32.14 KG/M2 | HEART RATE: 57 BPM | SYSTOLIC BLOOD PRESSURE: 116 MMHG | HEIGHT: 66 IN | WEIGHT: 200 LBS

## 2024-03-12 DIAGNOSIS — Z86.79 S/P ASCENDING AORTIC ANEURYSM REPAIR: ICD-10-CM

## 2024-03-12 DIAGNOSIS — I48.0 PAROXYSMAL ATRIAL FIBRILLATION: ICD-10-CM

## 2024-03-12 DIAGNOSIS — Z98.890 S/P ASCENDING AORTIC ANEURYSM REPAIR: ICD-10-CM

## 2024-03-12 DIAGNOSIS — I71.21 ANEURYSM OF ASCENDING AORTA WITHOUT RUPTURE: Primary | ICD-10-CM

## 2024-03-12 LAB
ALBUMIN SERPL-MCNC: 3.8 G/DL (ref 3.8–4.8)
ALBUMIN/GLOB SERPL: 1.5 {RATIO} (ref 1.2–2.2)
ALP SERPL-CCNC: 116 IU/L (ref 44–121)
ALT SERPL-CCNC: 33 IU/L (ref 0–44)
AST SERPL-CCNC: 40 IU/L (ref 0–40)
BASOPHILS # BLD AUTO: 0.1 X10E3/UL (ref 0–0.2)
BASOPHILS NFR BLD AUTO: 1 %
BILIRUB SERPL-MCNC: 0.3 MG/DL (ref 0–1.2)
BUN SERPL-MCNC: 15 MG/DL (ref 8–27)
BUN/CREAT SERPL: 11 (ref 10–24)
CALCIUM SERPL-MCNC: 9.2 MG/DL (ref 8.6–10.2)
CHLORIDE SERPL-SCNC: 101 MMOL/L (ref 96–106)
CO2 SERPL-SCNC: 27 MMOL/L (ref 20–29)
CREAT SERPL-MCNC: 1.39 MG/DL (ref 0.76–1.27)
EGFRCR SERPLBLD CKD-EPI 2021: 53 ML/MIN/1.73
EOSINOPHIL # BLD AUTO: 0.4 X10E3/UL (ref 0–0.4)
EOSINOPHIL NFR BLD AUTO: 3 %
ERYTHROCYTE [DISTWIDTH] IN BLOOD BY AUTOMATED COUNT: 12.4 % (ref 11.6–15.4)
FERRITIN SERPL-MCNC: 598 NG/ML (ref 30–400)
GLOBULIN SER CALC-MCNC: 2.6 G/DL (ref 1.5–4.5)
GLUCOSE SERPL-MCNC: 94 MG/DL (ref 70–99)
HCT VFR BLD AUTO: 33.5 % (ref 37.5–51)
HGB BLD-MCNC: 10.9 G/DL (ref 13–17.7)
IMM GRANULOCYTES # BLD AUTO: 0.1 X10E3/UL (ref 0–0.1)
IMM GRANULOCYTES NFR BLD AUTO: 1 %
IRON SATN MFR SERPL: 12 % (ref 15–55)
IRON SERPL-MCNC: 35 UG/DL (ref 38–169)
LYMPHOCYTES # BLD AUTO: 1.8 X10E3/UL (ref 0.7–3.1)
LYMPHOCYTES NFR BLD AUTO: 15 %
MCH RBC QN AUTO: 30.5 PG (ref 26.6–33)
MCHC RBC AUTO-ENTMCNC: 32.5 G/DL (ref 31.5–35.7)
MCV RBC AUTO: 94 FL (ref 79–97)
MONOCYTES # BLD AUTO: 0.8 X10E3/UL (ref 0.1–0.9)
MONOCYTES NFR BLD AUTO: 7 %
NEUTROPHILS # BLD AUTO: 8.4 X10E3/UL (ref 1.4–7)
NEUTROPHILS NFR BLD AUTO: 73 %
PLATELET # BLD AUTO: 400 X10E3/UL (ref 150–450)
POTASSIUM SERPL-SCNC: 5 MMOL/L (ref 3.5–5.2)
PROT SERPL-MCNC: 6.4 G/DL (ref 6–8.5)
RBC # BLD AUTO: 3.57 X10E6/UL (ref 4.14–5.8)
SODIUM SERPL-SCNC: 140 MMOL/L (ref 134–144)
TIBC SERPL-MCNC: 294 UG/DL (ref 250–450)
UIBC SERPL-MCNC: 259 UG/DL (ref 111–343)
WBC # BLD AUTO: 11.5 X10E3/UL (ref 3.4–10.8)

## 2024-03-12 NOTE — PROGRESS NOTES
Date of Office Visit: 2024  Encounter Provider: ADELINA Shankar  Place of Service: [facility] CARDIOLOGY  Patient Name: Nirav Szymanski  :1950    Chief Complaint   Patient presents with    Aortic Aneurysm   :     HPI: Nirav Szymanski is a 74 y.o. male who was first seen in the office by Dr. Bojorquez at the end of January following a recent diagnosis of a large ascending aortic aneurysm.  Subsequently, he was scheduled for a preoperative left cardiac catheterization.  This demonstrated normal coronaries.  On , he underwent an ascending aortic aneurysm repair, proximal aortic arch replacement, and left atrial appendage obliteration.  Postoperatively, he developed atrial fibrillation RVR and was started on IV amiodarone.  He converted to sinus rhythm and was transition to p.o. amiodarone.  Anticoagulation was deferred.  On 3/5, he was stable for discharge.  He is here today for follow-up.    Overall, he has been doing well.  He denies any chest pain, palpitations, edema, dizziness, or syncope.  He reports chronic dyspnea on exertion which he attributes to deconditioning.  He continues losing weight.    Past Medical History:   Diagnosis Date    Ascending aortic aneurysm     5.3 PER CARDIOLOGY NOTE    Colon polyps     GERD (gastroesophageal reflux disease)     History of prostate cancer     PONV (postoperative nausea and vomiting)        Past Surgical History:   Procedure Laterality Date    ASCENDING AORTIC ANEURYSM REPAIR W/ MECHANICAL AORTIC VALVE REPLACEMENT N/A 2024    Procedure: CELESTINE, STERNOTOMY, THORACIC AORTIC ANEURYSM REPAIR, CIRCULATORY ARREST, NEURO MONITORING, PRP;  Surgeon: John Akhtar MD;  Location: [facility] CVOR;  Service: Cardiothoracic;  Laterality: N/A;    CARDIAC CATHETERIZATION N/A 2024    Procedure: Left Heart Cath;  Surgeon: Suhail Bojorquez MD;  Location: [facility] CATH INVASIVE LOCATION;  Service: Cardiology;  Laterality: N/A;     CARDIAC CATHETERIZATION N/A 02/05/2024    Procedure: Coronary angiography;  Surgeon: Suhail Bojorquez MD;  Location: [facility] CATH INVASIVE LOCATION;  Service: Cardiology;  Laterality: N/A;    COLONOSCOPY      COLONOSCOPY N/A 02/20/2024    Procedure: COLONOSCOPY to cecum and into the terminal ileum with cold snare polypectomies;  Surgeon: Lexx Hill MD;  Location: [facility] ENDOSCOPY;  Service: Gastroenterology;  Laterality: N/A;  Pre: screening, hx polyps  Post: external hemorrhoids, polyps, tattoo, diverticulosis    INGUINAL HERNIA REPAIR Right     NASAL POLYP EXCISION      PROSTATECTOMY  2004    ROTATOR CUFF REPAIR Left     2005    SINUS SURGERY      TESTICLE SURGERY      CHILDHOOD       Social History     Socioeconomic History    Marital status:    Tobacco Use    Smoking status: Never    Smokeless tobacco: Never   Vaping Use    Vaping status: Never Used   Substance and Sexual Activity    Alcohol use: Not Currently    Drug use: Never    Sexual activity: Defer       Family History   Problem Relation Age of Onset    Pancreatic cancer Mother     Aneurysm Maternal Grandfather     Malig Hyperthermia Neg Hx        Review of Systems   Constitutional: Negative.   Cardiovascular:  Positive for dyspnea on exertion. Negative for chest pain, leg swelling, orthopnea, paroxysmal nocturnal dyspnea and syncope.   Respiratory: Negative.     Hematologic/Lymphatic: Negative for bleeding problem.   Musculoskeletal:  Negative for falls.   Gastrointestinal:  Negative for melena.   Neurological:  Negative for dizziness and light-headedness.       Allergies   Allergen Reactions    Famotidine Swelling and Rash    Ranitidine Hcl Swelling         Current Outpatient Medications:     amiodarone (PACERONE) 200 MG tablet, Take 1 tablet by mouth Daily., Disp: 30 tablet, Rfl: 0    aspirin 81 MG EC tablet, Take 1 tablet by mouth Daily. HELD FOR OR  Indications: Disease involving Lipid Deposits in the Arteries, Disp: , Rfl:     atenolol  "(TENORMIN) 25 MG tablet, Take 1 tablet by mouth Every 12 (Twelve) Hours for 90 days., Disp: 60 tablet, Rfl: 2    docusate sodium (COLACE) 250 MG capsule, Take 1 capsule by mouth Daily. Indications: Constipation, Disp: , Rfl:     famotidine (PEPCID) 20 MG tablet, Take 1 tablet by mouth 2 (Two) Times a Day. Indications: Gastroesophageal Reflux Disease, Heartburn, Disp: , Rfl:     ferrous sulfate 325 (65 FE) MG tablet, Take 1 tablet by mouth Daily With Breakfast for 60 days., Disp: 30 tablet, Rfl: 1    fexofenadine (ALLEGRA) 180 MG tablet, Take 1 tablet by mouth Every Night. Indications: Hayfever, Disp: , Rfl:     gabapentin (NEURONTIN) 100 MG capsule, Take 1 capsule by mouth Every 12 (Twelve) Hours for 7 days., Disp: 14 capsule, Rfl: 0    ipratropium (ATROVENT) 0.03 % nasal spray, 2 sprays into the nostril(s) as directed by provider As Needed for Rhinitis. Indications: Runny Nose, Disp: , Rfl:     Magnesium 400 MG tablet, Take 400 mg by mouth Daily. HELD FOR OR  Indications: supplement , Disp: , Rfl:   No current facility-administered medications for this visit.    Facility-Administered Medications Ordered in Other Visits:     Chlorhexidine Gluconate Cloth 2 % pads, , Topical, Q12H PRN, Leslie Handy APRN      Objective:     Vitals:    03/12/24 1221   BP: 116/64   Pulse: 57   Weight: 90.7 kg (200 lb)   Height: 167.6 cm (66\")     Body mass index is 32.28 kg/m².    PHYSICAL EXAM:    Neck:      Vascular: No JVD.   Pulmonary:      Effort: Pulmonary effort is normal.      Breath sounds: Normal breath sounds.   Cardiovascular:      Normal rate. Regular rhythm.      Murmurs: There is no murmur.      No gallop.  No click. No rub.   Pulses:     Intact distal pulses.   Skin:     Comments: Midsternal incision well-healed. No erythema or edema.              ECG 12 Lead    Date/Time: 3/12/2024 12:39 PM  Performed by: Migdalia Grijalva APRN    Authorized by: Migdalia Grijalva APRN  Comparison: compared with previous " ECG from 3/4/2024  Similar to previous ECG  Rhythm: sinus rhythm  Rate: normal  BPM: 57  Conduction: right bundle branch block            Assessment:       Diagnosis Plan   1. Aneurysm of ascending aorta without rupture  ECG 12 Lead      2. S/P ascending aortic aneurysm repair        3. Paroxysmal atrial fibrillation          Orders Placed This Encounter   Procedures    ECG 12 Lead     This order was created via procedure documentation     Order Specific Question:   Release to patient     Answer:   Routine Release [[id number]]          Plan:       1.  Ascending aortic aneurysm.  Status post repair.      2.  Paroxysmal atrial fibrillation.  This was noted postoperatively.  He converted with sinus rhythm.  He is maintaining sinus rhythm with amiodarone.  He is not anticoagulated.  Of note, he underwent a left atrial appendage obliteration during surgery.  Continue atenolol.      I think he is doing well.  Given the continued weight loss, I did advise him to discontinue Lasix and potassium.  Otherwise, I am not making any other changes.  He will follow-up with Dr. Bojorquez on 4/9.      As always, it has been a pleasure to participate in your patient's care.      Sincerely,         ADELINA Mendoza

## 2024-03-13 ENCOUNTER — HOME CARE VISIT (OUTPATIENT)
Dept: HOME HEALTH SERVICES | Facility: HOME HEALTHCARE | Age: 74
End: 2024-03-13
Payer: MEDICARE

## 2024-03-13 VITALS
OXYGEN SATURATION: 96 % | RESPIRATION RATE: 18 BRPM | TEMPERATURE: 97.5 F | HEART RATE: 63 BPM | SYSTOLIC BLOOD PRESSURE: 96 MMHG | DIASTOLIC BLOOD PRESSURE: 60 MMHG

## 2024-03-13 PROCEDURE — G0151 HHCP-SERV OF PT,EA 15 MIN: HCPCS

## 2024-03-13 NOTE — HOME HEALTH
"Subjective: \"The doctor's office was very pleased with my recovery so far.\"    Falls reported: none    Medication changes: discontinued lasix and potassium per patient/spouse at MD office visit yesterday"

## 2024-03-14 ENCOUNTER — HOME CARE VISIT (OUTPATIENT)
Dept: HOME HEALTH SERVICES | Facility: HOME HEALTHCARE | Age: 74
End: 2024-03-14
Payer: MEDICARE

## 2024-03-14 VITALS
OXYGEN SATURATION: 95 % | RESPIRATION RATE: 18 BRPM | TEMPERATURE: 98.7 F | DIASTOLIC BLOOD PRESSURE: 58 MMHG | SYSTOLIC BLOOD PRESSURE: 98 MMHG | HEART RATE: 64 BPM

## 2024-03-14 PROCEDURE — G0299 HHS/HOSPICE OF RN EA 15 MIN: HCPCS

## 2024-03-14 NOTE — HOME HEALTH
Discharge Decision:      Plan for discharge: Patient and wife understand discharge instructions and all follow up appointments.     Any declines in outcomes: discuss and document reason/Order received to discharge without goals met? ALL GOALS MET     Teaching needed prior to discharge: Continue on all medications as RX

## 2024-03-15 ENCOUNTER — READMISSION MANAGEMENT (OUTPATIENT)
Dept: CALL CENTER | Facility: HOSPITAL | Age: 74
End: 2024-03-15
Payer: MEDICARE

## 2024-03-15 NOTE — OUTREACH NOTE
CT Surgery Week 2 Survey      Flowsheet Row Responses   [facility] patient discharged from? [facility]   Does the patient have one of the following disease processes/diagnoses(primary or secondary)? Cardiothoracic surgery   Week 2 attempt successful? Yes   Call start time 1211   Call end time 1219   Discharge diagnosis Aneuysm of ascending aorta-repair this visit   Person spoke with today (if not patient) and relationship patient   Medication alerts for this patient Amiodarone, Atenolol, Ferrous Sulfate, Lasix, Neurontin, Norco, K-Dur   Meds reviewed with patient/caregiver? Yes   Is the patient having any side effects they believe may be caused by any medication additions or changes? No   Does the patient have all medications related to this admission filled (includes all antibiotics, pain medications, cardiac medications, etc.) Yes   Is the patient taking all medications as directed (includes completed medication regime)? Yes   Comments regarding appointments Cardiology f/u appt on 4/9/24 with Dr. Suhail Bojorquez.   Does the patient have a primary care provider?  Yes   Does the patient have an appointment scheduled with their C/T surgeon? Yes  [CTS post op appt is on 4/2/24 with ADELINA Adams.]   Comments regarding PCP Dr. Hia Bender--PCP, had f/u appt on 3/11/24.   Has the patient kept scheduled appointments due by today? Yes   What is the Home health agency?  [facility]   Has home health visited the patient within 72 hours of discharge? Yes   Home health comments Pt states  discharged him yesterday.   Psychosocial issues? No   Comments Pt states he is doing good. Incisions look good, no s/s of infection noted. He is up and walking around. Eating and drinking well. His chronic shortness of breath is improved. Pt understands post restrictions, no lifting heavier than 10lbs.for 6 weeks, shower only until healed.   Did the patient receive a copy of their discharge instructions? Yes   Nursing  interventions Reviewed instructions with patient   What is the patient's perception of their health status since discharge? Improving   Nursing interventions Nurse provided patient education   Is the patient/caregiver able to teach back normal signs of recovery? Pain or discomfort at incisional site, Nausea and lack of appetite   Nursing interventions Reassured on normal signs of recovery   Is the patient /caregiver able to teach back basic post-op care? Continue use of incentive spirometry at least 1 week post discharge, Practice cough and deep breath every 4 hours while awake, Hold pillow to support chest when coughing, Drive as instructed by MD in discharge instructions, Shower daily, No tub bath, swimming, or hot tub until instructed by MD, Use a clean wash cloth and antibacterial bar or liquid soap to clean incisions, If the steri-strips are falling off, it is okay to remove them. (If applicable), Lifting as instructed by MD in discharge instructions   Is the patient/caregiver able to teach back signs and symptoms of incisional infection? Increased redness, swelling or pain at the incisonal site, Fever, Pus or odor from incision, Incisional warmth, Increased drainage or bleeding   Is the patient/caregiver able to teach back steps to recovery at home? Set small, achievable goals for return to baseline health, Rest and rebuild strength, gradually increase activity, Eat a well-balance diet   Is the patient /caregiver able to teach back the importance of cardiac rehab? Yes   Nursing interventions Provided education on importance of cardiac rehab   If the patient is a current smoker, are they able to teach back resources for cessation? Not a smoker   Is the patient/caregiver able to teach back the hierarchy of who to call/visit for symptoms/problems? PCP, Specialist, Home health nurse, Urgent Care, ED, 911 Yes   Week 2 call completed? Yes   Graduated Yes   Is the patient interested in additional calls from an  ambulatory ? No   Would this patient benefit from a Referral to [facility] Social Work? No   Wrap up additional comments Pt denies any concerns or needs. No questions. No further calls needed.   Call end time 1219            María WRAY - Registered Nurse

## 2024-03-25 DIAGNOSIS — K21.9 GASTROESOPHAGEAL REFLUX DISEASE WITHOUT ESOPHAGITIS: ICD-10-CM

## 2024-03-25 RX ORDER — FAMOTIDINE 20 MG
TABLET ORAL
Qty: 180 TABLET | Refills: 0 | Status: SHIPPED | OUTPATIENT
Start: 2024-03-25

## 2024-04-02 ENCOUNTER — OFFICE VISIT (OUTPATIENT)
Dept: CARDIAC SURGERY | Facility: CLINIC | Age: 74
End: 2024-04-02
Payer: MEDICARE

## 2024-04-02 VITALS
RESPIRATION RATE: 18 BRPM | TEMPERATURE: 98.2 F | SYSTOLIC BLOOD PRESSURE: 149 MMHG | WEIGHT: 197 LBS | HEIGHT: 68 IN | BODY MASS INDEX: 29.86 KG/M2 | DIASTOLIC BLOOD PRESSURE: 71 MMHG | HEART RATE: 51 BPM | OXYGEN SATURATION: 98 %

## 2024-04-02 DIAGNOSIS — Z98.890 S/P ASCENDING AORTIC ANEURYSM REPAIR: Primary | ICD-10-CM

## 2024-04-02 DIAGNOSIS — Z86.79 S/P ASCENDING AORTIC ANEURYSM REPAIR: Primary | ICD-10-CM

## 2024-04-02 NOTE — LETTER
"April 2, 2024       No Recipients    Patient: Nirav Szymanski   YOB: 1950   Date of Visit: 4/2/2024     Dear Hai Bender MD:       Thank you for referring Nirav Szymanski to me for evaluation. Below are the relevant portions of my assessment and plan of care.    If you have questions, please do not hesitate to call me. I look forward to following Nirav along with you.         Sincerely,        ADELINA Rodriguez        CC:   No Recipients    Amrita Lin APRN  04/02/24 1344  Sign when Signing Visit  CARDIOVASCULAR SURGERY FOLLOW-UP PROGRESS NOTE  Chief Complaint: post op        HPI:   Dear Hai Gonzales MD and colleagues:    It was nice to see Nirav Szymanski in follow up 5 weeks after surgery.  As you know, he is a 74 y.o. male with ascending aortic aneurysm who underwent aortic aneurysm repair/hemiarch/DENEEN clip by Dr. Akhtar on 2/28. He did well postoperatively and continues to do well. He comes in today complaining of nothing.  His activity level has been good.   From a surgical standpoint he is doing well.  He denies any popping or clicking with cough or deep inspiration. He has been walking daily and feels great.  He has had some difficulty sleeping but I think it is due to late caffeine intake.  I encouraged him to limit caffeine in the afternoon.  His blood pressure is elevated here today but his record at home his blood pressure has been controlled.       Physical Exam:         /71 (BP Location: Left arm, Patient Position: Sitting, Cuff Size: Adult)   Pulse 51   Temp 98.2 °F (36.8 °C)   Resp 18   Ht 171.5 cm (67.5\")   Wt 89.4 kg (197 lb)   SpO2 98%   BMI 30.40 kg/m²   Heart:  regular rate and rhythm  Lungs:  clear to auscultation bilaterally  Extremities:  no edema  Incision(s):  sternum stable    Assessment/Plan:     S/P ascending aortic aneurysm repair. Overall, he is doing well.    No significant post-op complications    No heavy lifting > 10 pounds for 1 " more weeks, no more than 50 lbs for 3 months  OK to drive if not taking narcotic pain medicine  Follow-up as scheduled with cardiology  Follow-up as scheduled with PCP  Return to clinic in 1 year(s) with CT        Thank you for allowing me to participate in the care of your   patient.  Regards,  ADELINA Rodriguez

## 2024-04-02 NOTE — PROGRESS NOTES
"CARDIOVASCULAR SURGERY FOLLOW-UP PROGRESS NOTE  Chief Complaint: post op        HPI:   Dear Dr. Bender, Hai Daniel MD and colleagues:    It was nice to see Nirav Szymanski in follow up 5 weeks after surgery.  As you know, he is a 74 y.o. male with ascending aortic aneurysm who underwent aortic aneurysm repair/hemiarch/DENEEN clip by Dr. Akhtar on 2/28. He did well postoperatively and continues to do well. He comes in today complaining of nothing.  His activity level has been good.   From a surgical standpoint he is doing well.  He denies any popping or clicking with cough or deep inspiration. He has been walking daily and feels great.  He has had some difficulty sleeping but I think it is due to late caffeine intake.  I encouraged him to limit caffeine in the afternoon.  His blood pressure is elevated here today but his record at home his blood pressure has been controlled.       Physical Exam:         /71 (BP Location: Left arm, Patient Position: Sitting, Cuff Size: Adult)   Pulse 51   Temp 98.2 °F (36.8 °C)   Resp 18   Ht 171.5 cm (67.5\")   Wt 89.4 kg (197 lb)   SpO2 98%   BMI 30.40 kg/m²   Heart:  regular rate and rhythm  Lungs:  clear to auscultation bilaterally  Extremities:  no edema  Incision(s):  sternum stable    Assessment/Plan:     S/P ascending aortic aneurysm repair. Overall, he is doing well.    No significant post-op complications    No heavy lifting > 10 pounds for 1 more weeks, no more than 50 lbs for 3 months  OK to drive if not taking narcotic pain medicine  Follow-up as scheduled with cardiology  Follow-up as scheduled with PCP  Return to clinic in 1 year(s) with CT        Thank you for allowing me to participate in the care of your   patient.  Regards,  ADELINA Rodriguez    "

## 2024-04-09 ENCOUNTER — OFFICE VISIT (OUTPATIENT)
Dept: CARDIOLOGY | Facility: CLINIC | Age: 74
End: 2024-04-09
Payer: MEDICARE

## 2024-04-09 VITALS
HEART RATE: 51 BPM | SYSTOLIC BLOOD PRESSURE: 124 MMHG | BODY MASS INDEX: 30.46 KG/M2 | WEIGHT: 201 LBS | HEIGHT: 68 IN | DIASTOLIC BLOOD PRESSURE: 66 MMHG

## 2024-04-09 DIAGNOSIS — Z86.79 S/P ASCENDING AORTIC ANEURYSM REPAIR: Primary | ICD-10-CM

## 2024-04-09 DIAGNOSIS — Z98.890 S/P ASCENDING AORTIC ANEURYSM REPAIR: Primary | ICD-10-CM

## 2024-04-09 PROCEDURE — 93000 ELECTROCARDIOGRAM COMPLETE: CPT | Performed by: INTERNAL MEDICINE

## 2024-04-09 PROCEDURE — 99213 OFFICE O/P EST LOW 20 MIN: CPT | Performed by: INTERNAL MEDICINE

## 2024-04-09 NOTE — PROGRESS NOTES
Date of Office Visit: 24  Encounter Provider: Suhail Bojorquez MD  Place of Service: [facility] CARDIOLOGY  Patient Name: Nirav Szymanski  :1950  [id number]    Chief Complaint   Patient presents with    1 month hospital f/u        HPI: Nirav Szymanski is a 74 y.o. male he had an aortic ascending aneurysm that was resected in 2024.  Also had a left atrial appendage clipping at that time.  Here for follow-up today.  He had normal coronary arteries prior to his cath and normal LV function.  He is here for follow-up and is doing pretty well his breathing is good no chest pain shortness of breath a little bit of fatigue.  His blood pressures have been excellent preoperatively he really was not on any medicine.    Past Medical History:   Diagnosis Date    Ascending aortic aneurysm     5.3 PER CARDIOLOGY NOTE    Colon polyps     GERD (gastroesophageal reflux disease)     History of prostate cancer     PONV (postoperative nausea and vomiting)        Past Surgical History:   Procedure Laterality Date    ASCENDING AORTIC ANEURYSM REPAIR W/ MECHANICAL AORTIC VALVE REPLACEMENT N/A 2024    Procedure: CELESTINE, STERNOTOMY, THORACIC AORTIC ANEURYSM REPAIR, CIRCULATORY ARREST, NEURO MONITORING, PRP;  Surgeon: John Akhtar MD;  Location: [facility] CVOR;  Service: Cardiothoracic;  Laterality: N/A;    CARDIAC CATHETERIZATION N/A 2024    Procedure: Left Heart Cath;  Surgeon: Suhail Bojorquez MD;  Location: [facility] CATH INVASIVE LOCATION;  Service: Cardiology;  Laterality: N/A;    CARDIAC CATHETERIZATION N/A 2024    Procedure: Coronary angiography;  Surgeon: Suhail Bojoruqez MD;  Location: [facility] CATH INVASIVE LOCATION;  Service: Cardiology;  Laterality: N/A;    COLONOSCOPY      COLONOSCOPY N/A 2024    Procedure: COLONOSCOPY to cecum and into the terminal ileum with cold snare polypectomies;  Surgeon: Lexx Hill MD;  Location: [facility] ENDOSCOPY;  Service:  Gastroenterology;  Laterality: N/A;  Pre: screening, hx polyps  Post: external hemorrhoids, polyps, tattoo, diverticulosis    INGUINAL HERNIA REPAIR Right     NASAL POLYP EXCISION      PROSTATECTOMY  2004    ROTATOR CUFF REPAIR Left     2005    SINUS SURGERY      TESTICLE SURGERY      CHILDHOOD       Social History     Socioeconomic History    Marital status:    Tobacco Use    Smoking status: Never    Smokeless tobacco: Never   Vaping Use    Vaping status: Never Used   Substance and Sexual Activity    Alcohol use: Not Currently    Drug use: Never    Sexual activity: Defer       Family History   Problem Relation Age of Onset    Pancreatic cancer Mother     Aneurysm Maternal Grandfather     Malig Hyperthermia Neg Hx        Review of Systems   Constitutional: Negative for decreased appetite, fever, malaise/fatigue and weight loss.   HENT:  Negative for nosebleeds.    Eyes:  Negative for double vision.   Cardiovascular:  Negative for chest pain, claudication, cyanosis, dyspnea on exertion, irregular heartbeat, leg swelling, near-syncope, orthopnea, palpitations, paroxysmal nocturnal dyspnea and syncope.   Respiratory:  Negative for cough, hemoptysis and shortness of breath.    Hematologic/Lymphatic: Negative for bleeding problem.   Skin:  Negative for rash.   Musculoskeletal:  Negative for falls and myalgias.   Gastrointestinal:  Negative for hematochezia, jaundice, melena, nausea and vomiting.   Genitourinary:  Negative for hematuria.   Neurological:  Negative for dizziness and seizures.   Psychiatric/Behavioral:  Negative for altered mental status and memory loss.        Allergies   Allergen Reactions    Famotidine Swelling and Rash    Ranitidine Hcl Swelling         Current Outpatient Medications:     aspirin 81 MG EC tablet, Take 1 tablet by mouth Daily. HELD FOR OR  Indications: Disease involving Lipid Deposits in the Arteries, Disp: , Rfl:     atenolol (TENORMIN) 25 MG tablet, Take 1 tablet by mouth Every  "12 (Twelve) Hours for 90 days., Disp: 60 tablet, Rfl: 2    docusate sodium (COLACE) 250 MG capsule, Take 1 capsule by mouth Daily. Indications: Constipation, Disp: , Rfl:     famotidine (PEPCID) 20 MG tablet, Take 1 tablet by mouth 2 (Two) Times a Day. Indications: Gastroesophageal Reflux Disease, Heartburn, Disp: , Rfl:     ferrous sulfate 325 (65 FE) MG tablet, Take 1 tablet by mouth Daily With Breakfast for 60 days., Disp: 30 tablet, Rfl: 1    ipratropium (ATROVENT) 0.03 % nasal spray, 2 sprays into the nostril(s) as directed by provider As Needed for Rhinitis. Indications: Runny Nose, Disp: , Rfl:     Magnesium 400 MG tablet, Take 400 mg by mouth Daily. HELD FOR OR  Indications: supplement , Disp: , Rfl:     fexofenadine (ALLEGRA) 180 MG tablet, Take 1 tablet by mouth Every Night. Indications: Hayfever (Patient not taking: Reported on 4/9/2024), Disp: , Rfl:       Objective:     Vitals:    04/09/24 1412   BP: 124/66   BP Location: Left arm   Patient Position: Sitting   Pulse: 51   Weight: 91.2 kg (201 lb)   Height: 171.5 cm (67.5\")     Body mass index is 31.02 kg/m².    Constitutional:       Appearance: Well-developed.   Eyes:      General: No scleral icterus.  HENT:      Head: Normocephalic.   Neck:      Thyroid: No thyromegaly.      Vascular: No JVD.      Lymphadenopathy: No cervical adenopathy.   Pulmonary:      Effort: Pulmonary effort is normal.      Breath sounds: Normal breath sounds. No wheezing. No rales.   Cardiovascular:      Normal rate. Regular rhythm.      No gallop.    Edema:     Peripheral edema absent.   Abdominal:      Palpations: Abdomen is soft.      Tenderness: There is no abdominal tenderness.   Musculoskeletal: Normal range of motion. Skin:     General: Skin is warm and dry.      Findings: No rash.   Neurological:      Mental Status: Alert and oriented to person, place, and time.           ECG 12 Lead    Date/Time: 4/9/2024 2:57 PM  Performed by: Suhail Bojorquez MD    Authorized by: " Suhail Bojorquez MD  Comparison: compared with previous ECG   Similar to previous ECG  Rhythm: sinus rhythm    Clinical impression: normal ECG           Assessment:       Diagnosis Plan   1. S/P ascending aortic aneurysm repair               Plan:       He is here for follow-up and is doing quite well glad we got through his surgery he will of course follow with the surgeons but cardiovascularly he is doing well his blood pressure is lowish his heart rate is lowish I am going to stop his atenolol today and see how he does off of it we will have him come back and see us in a year sooner if he has trouble    No follow-ups on file.     As always, it has been a pleasure to participate in your patient's care.      Sincerely,       Suhail Bojorquez MD

## 2024-04-10 DIAGNOSIS — R79.89 ELEVATED SERUM CREATININE: ICD-10-CM

## 2024-04-10 DIAGNOSIS — D64.9 ANEMIA, UNSPECIFIED TYPE: Primary | ICD-10-CM

## 2024-04-10 DIAGNOSIS — D64.9 ANEMIA, UNSPECIFIED TYPE: ICD-10-CM

## 2024-04-11 LAB
BASOPHILS # BLD AUTO: 0.05 10*3/MM3 (ref 0–0.2)
BASOPHILS NFR BLD AUTO: 1 % (ref 0–1.5)
BUN SERPL-MCNC: 14 MG/DL (ref 8–23)
BUN/CREAT SERPL: 9.9 (ref 7–25)
CALCIUM SERPL-MCNC: 9.4 MG/DL (ref 8.6–10.5)
CHLORIDE SERPL-SCNC: 107 MMOL/L (ref 98–107)
CO2 SERPL-SCNC: 27.8 MMOL/L (ref 22–29)
CREAT SERPL-MCNC: 1.41 MG/DL (ref 0.76–1.27)
EGFRCR SERPLBLD CKD-EPI 2021: 51.6 ML/MIN/1.73
EOSINOPHIL # BLD AUTO: 0.39 10*3/MM3 (ref 0–0.4)
EOSINOPHIL NFR BLD AUTO: 7.4 % (ref 0.3–6.2)
ERYTHROCYTE [DISTWIDTH] IN BLOOD BY AUTOMATED COUNT: 12.6 % (ref 12.3–15.4)
FERRITIN SERPL-MCNC: 203 NG/ML (ref 30–400)
GLUCOSE SERPL-MCNC: 78 MG/DL (ref 65–99)
HCT VFR BLD AUTO: 37.1 % (ref 37.5–51)
HGB BLD-MCNC: 11.7 G/DL (ref 13–17.7)
IMM GRANULOCYTES # BLD AUTO: 0.01 10*3/MM3 (ref 0–0.05)
IMM GRANULOCYTES NFR BLD AUTO: 0.2 % (ref 0–0.5)
IRON SATN MFR SERPL: 20 % (ref 20–50)
IRON SERPL-MCNC: 73 MCG/DL (ref 59–158)
LYMPHOCYTES # BLD AUTO: 1.73 10*3/MM3 (ref 0.7–3.1)
LYMPHOCYTES NFR BLD AUTO: 33 % (ref 19.6–45.3)
MCH RBC QN AUTO: 29.8 PG (ref 26.6–33)
MCHC RBC AUTO-ENTMCNC: 31.5 G/DL (ref 31.5–35.7)
MCV RBC AUTO: 94.4 FL (ref 79–97)
MONOCYTES # BLD AUTO: 0.45 10*3/MM3 (ref 0.1–0.9)
MONOCYTES NFR BLD AUTO: 8.6 % (ref 5–12)
NEUTROPHILS # BLD AUTO: 2.62 10*3/MM3 (ref 1.7–7)
NEUTROPHILS NFR BLD AUTO: 49.8 % (ref 42.7–76)
NRBC BLD AUTO-RTO: 0 /100 WBC (ref 0–0.2)
PLATELET # BLD AUTO: 199 10*3/MM3 (ref 140–450)
POTASSIUM SERPL-SCNC: 4.6 MMOL/L (ref 3.5–5.2)
RBC # BLD AUTO: 3.93 10*6/MM3 (ref 4.14–5.8)
SODIUM SERPL-SCNC: 144 MMOL/L (ref 136–145)
TIBC SERPL-MCNC: 361 MCG/DL
UIBC SERPL-MCNC: 288 MCG/DL (ref 112–346)
WBC # BLD AUTO: 5.25 10*3/MM3 (ref 3.4–10.8)

## 2024-04-16 RX ORDER — AMIODARONE HYDROCHLORIDE 200 MG/1
200 TABLET ORAL DAILY
Qty: 30 TABLET | Refills: 0 | OUTPATIENT
Start: 2024-04-16

## 2024-04-24 ENCOUNTER — TELEPHONE (OUTPATIENT)
Dept: FAMILY MEDICINE CLINIC | Facility: CLINIC | Age: 74
End: 2024-04-24
Payer: MEDICARE

## 2024-04-24 NOTE — TELEPHONE ENCOUNTER
Caller: Nirav Szymanski    Relationship: Self    Best call back number: [phone]     Which medication are you concerned about: ferrous sulfate 325 (65 FE) MG tablet     Who prescribed you this medication: JACQUELINE EDWARDS APRN    When did you start taking this medication: 3/5/2024    What are your concerns: PATIENT IS WONDERING IF HE IS SUPPOSED TO CONTINUE TAKING THIS MEDICATION.  HE HAD BLOOD WORK DONE ON 4/10/24 AND SAID DR. DOUGLAS DIDN'T LET HIM KNOW IF HE STILL NEEDED IT.  IF SO, HE WILL NEED A NEW PRESCRIPTION CALLED IN.  HE HAS ABOUT 10 DAYS LEFT.    Message was left that his iron and ferritin looked okay and he can stop taking the iron supplement

## 2024-05-30 RX ORDER — ATENOLOL 25 MG/1
25 TABLET ORAL EVERY 12 HOURS
Qty: 180 TABLET | OUTPATIENT
Start: 2024-05-30

## 2024-07-07 DIAGNOSIS — K21.9 GASTROESOPHAGEAL REFLUX DISEASE WITHOUT ESOPHAGITIS: ICD-10-CM

## 2024-07-08 RX ORDER — FAMOTIDINE 20 MG
TABLET ORAL
Qty: 180 TABLET | Refills: 0 | Status: SHIPPED | OUTPATIENT
Start: 2024-07-08

## 2024-08-13 ENCOUNTER — TRANSCRIBE ORDERS (OUTPATIENT)
Dept: ADMINISTRATIVE | Facility: HOSPITAL | Age: 74
End: 2024-08-13
Payer: MEDICARE

## 2024-08-13 DIAGNOSIS — K76.89 OTHER SPECIFIED DISEASES OF LIVER: Primary | ICD-10-CM

## 2024-09-24 ENCOUNTER — HOSPITAL ENCOUNTER (OUTPATIENT)
Facility: HOSPITAL | Age: 74
Discharge: HOME | End: 2024-09-24
Admitting: INTERNAL MEDICINE
Payer: MEDICARE

## 2024-09-24 DIAGNOSIS — K76.89 OTHER SPECIFIED DISEASES OF LIVER: ICD-10-CM

## 2024-09-24 PROCEDURE — 74170 CT ABD WO CNTRST FLWD CNTRST: CPT

## 2024-09-24 PROCEDURE — 25510000001 IOPAMIDOL 61 % SOLUTION: Performed by: INTERNAL MEDICINE

## 2024-09-24 RX ORDER — IOPAMIDOL 612 MG/ML
100 INJECTION, SOLUTION INTRAVASCULAR
Status: COMPLETED | OUTPATIENT
Start: 2024-09-24 | End: 2024-09-24

## 2024-09-24 RX ADMIN — IOPAMIDOL 85 ML: 612 INJECTION, SOLUTION INTRAVENOUS at 09:19

## 2024-10-02 ENCOUNTER — TELEPHONE (OUTPATIENT)
Dept: FAMILY MEDICINE CLINIC | Facility: CLINIC | Age: 74
End: 2024-10-02

## 2024-10-02 DIAGNOSIS — K21.9 GASTROESOPHAGEAL REFLUX DISEASE, UNSPECIFIED WHETHER ESOPHAGITIS PRESENT: Primary | ICD-10-CM

## 2024-10-02 RX ORDER — FAMOTIDINE 20 MG
20 TABLET ORAL 2 TIMES DAILY
Qty: 60 TABLET | Refills: 0 | Status: SHIPPED | OUTPATIENT
Start: 2024-10-02 | End: 2024-10-11 | Stop reason: SDUPTHER

## 2024-10-02 NOTE — TELEPHONE ENCOUNTER
Caller: Nirav Szymanski    Relationship: Self    Best call back number: [phone]     What medication are you requesting: NEEDS BRAND NAME PEPCID PATIENT IS ALLERGIC TO GENERIC        If a prescription is needed, what is your preferred pharmacy and phone number:    [facility] [place] [facility] - [address] AT Portal to Registered [facility] - [phone]  - [phone] FX   Additional notes:

## 2024-10-11 ENCOUNTER — OFFICE VISIT (OUTPATIENT)
Dept: FAMILY MEDICINE CLINIC | Facility: CLINIC | Age: 74
End: 2024-10-11
Payer: MEDICARE

## 2024-10-11 VITALS
HEIGHT: 68 IN | HEART RATE: 71 BPM | OXYGEN SATURATION: 96 % | WEIGHT: 206.8 LBS | RESPIRATION RATE: 18 BRPM | BODY MASS INDEX: 31.34 KG/M2 | SYSTOLIC BLOOD PRESSURE: 132 MMHG | DIASTOLIC BLOOD PRESSURE: 78 MMHG

## 2024-10-11 DIAGNOSIS — I71.23 ANEURYSM OF DESCENDING THORACIC AORTA WITHOUT RUPTURE: ICD-10-CM

## 2024-10-11 DIAGNOSIS — K76.89 LIVER CYST: Primary | ICD-10-CM

## 2024-10-11 DIAGNOSIS — K21.9 GASTROESOPHAGEAL REFLUX DISEASE, UNSPECIFIED WHETHER ESOPHAGITIS PRESENT: ICD-10-CM

## 2024-10-11 PROCEDURE — 1126F AMNT PAIN NOTED NONE PRSNT: CPT | Performed by: INTERNAL MEDICINE

## 2024-10-11 PROCEDURE — 99213 OFFICE O/P EST LOW 20 MIN: CPT | Performed by: INTERNAL MEDICINE

## 2024-10-11 RX ORDER — FAMOTIDINE 20 MG
20 TABLET ORAL 2 TIMES DAILY
Qty: 180 TABLET | Refills: 1 | Status: SHIPPED | OUTPATIENT
Start: 2024-10-11 | End: 2024-10-11 | Stop reason: SDUPTHER

## 2024-10-11 RX ORDER — FAMOTIDINE 20 MG
20 TABLET ORAL 2 TIMES DAILY
Qty: 180 TABLET | Refills: 1 | Status: SHIPPED | OUTPATIENT
Start: 2024-10-11

## 2024-10-11 NOTE — PROGRESS NOTES
Subjective complaint is follow-up on CAT scan results  Nirav Szymanski is a 74 y.o. male.     History of Present Illness Nirav is here today for follow-up.  He had a CAT scan done recently to look at his large liver cyst.  Previously it had measured a little over 12 cm.  It does appear to have diminished in size.  He is not having any symptoms from this.  On this scan there was inadvertently seen a dilatation of his descending thoracic aorta 4.1 cm.  I did review previous scans from [facility] and it was previously 3.9 cm.  He is not symptomatic from this either.  We did advise we may just want to get a scan once a year.    The following portions of the patient's history were reviewed and updated as appropriate: allergies, current medications, and problem list.    Review of Systems   Respiratory:  Negative for chest tightness and shortness of breath.    Cardiovascular:  Negative for chest pain.     Objective   Physical Exam  Vitals and nursing note reviewed.   Constitutional:       Appearance: Normal appearance.   Neck:      Vascular: No carotid bruit.   Cardiovascular:      Rate and Rhythm: Normal rate and regular rhythm.      Heart sounds: No murmur heard.     No friction rub. No gallop.   Pulmonary:      Effort: Pulmonary effort is normal.      Breath sounds: No wheezing, rhonchi or rales.   Abdominal:      General: Bowel sounds are normal.      Palpations: Abdomen is soft.      Tenderness: There is no abdominal tenderness. There is no guarding or rebound.   Neurological:      Mental Status: He is alert.     Assessment & Plan   Diagnoses and all orders for this visit:    1. Liver cyst (Primary)    2. Aneurysm of descending thoracic aorta without rupture    3. Gastroesophageal reflux disease, unspecified whether esophagitis present  -     Discontinue: Pepcid 20 MG tablet; Take 1 tablet by mouth 2 (Two) Times a Day.  Dispense: 180 tablet; Refill: 1  -     Pepcid 20 MG tablet; Take 1 tablet by mouth 2 (Two) Times a Day.   Dispense: 180 tablet; Refill: 1    Nirav is here today for follow-up.  His liver cyst has actually diminished in size and he is asymptomatic.  Therefore I do not think there is anything to do differently.  We are just going to follow his aneurysm with scans.  It does not appear to have enlarged much.

## 2025-01-08 DIAGNOSIS — K21.9 GASTROESOPHAGEAL REFLUX DISEASE, UNSPECIFIED WHETHER ESOPHAGITIS PRESENT: ICD-10-CM

## 2025-01-08 RX ORDER — CALCIUM CARBONATE 300MG(750)
400 TABLET,CHEWABLE ORAL DAILY
OUTPATIENT
Start: 2025-01-08

## 2025-01-08 RX ORDER — FAMOTIDINE 20 MG
20 TABLET ORAL 2 TIMES DAILY
Qty: 60 TABLET | Refills: 0 | Status: SHIPPED | OUTPATIENT
Start: 2025-01-08

## 2025-01-08 NOTE — TELEPHONE ENCOUNTER
Caller: Stephon Nirav KIMMIE    Relationship: Self    Best call back number: [phone]     Requested Prescriptions:   Requested Prescriptions     Pending Prescriptions Disp Refills    Magnesium 400 MG tablet       Sig: Take 400 mg by mouth Daily. HELD FOR OR  Indications: supplement        Pharmacy where request should be sent: [facility]/PHARMACY #[id number] [place] - [address] AT [facility] [phone]  - [phone] FX     Last office visit with prescribing clinician: 10/11/2024   Last telemedicine visit with prescribing clinician: Visit date not found   Next office visit with prescribing clinician: Visit date not found     Additional details provided by patient: PATIENT HAS ONE OR TWO DAYS OF THIS MEDICATION LEFT.     PLEASE MAKE SURE THIS IS FOR 90 DAY SUPPLY    Does the patient have less than a 3 day supply:  [x] Yes  [] No    Would you like a call back once the refill request has been completed: [] Yes [x] No    If the office needs to give you a call back, can they leave a voicemail: [] Yes [x] No    Shad Caraballo Rep   01/08/25 13:23 EST

## 2025-01-09 DIAGNOSIS — E83.42 HYPOMAGNESEMIA: Primary | ICD-10-CM

## 2025-01-09 DIAGNOSIS — E83.51 HYPOCALCEMIA: ICD-10-CM

## 2025-01-23 ENCOUNTER — TELEPHONE (OUTPATIENT)
Dept: FAMILY MEDICINE CLINIC | Facility: CLINIC | Age: 75
End: 2025-01-23

## 2025-01-23 NOTE — TELEPHONE ENCOUNTER
Caller: CARRIER    Relationship: Other/[facility]    Best call back number: [phone] OPT 2    REFERENCE NUMBER IS [id number]        What was the call regarding:       PATIENT'S MAIL ORDER SHIPMENT HAS NOT MADE IT TO THE PATIENT AND THE PHARMACY IS NEEDING VERBAL AUTHORIZATION TO REPLACE THE ORDER.  THE MEDICATION IS PEPCID 20 MG.    THEY WOULD LIKE A CALL BACK PLEASE

## 2025-02-18 DIAGNOSIS — K21.9 GASTROESOPHAGEAL REFLUX DISEASE, UNSPECIFIED WHETHER ESOPHAGITIS PRESENT: ICD-10-CM

## 2025-02-18 NOTE — TELEPHONE ENCOUNTER
Patient wants a 90 day of this medication sent mail order so that he dont have to keep paying for 30 days it cheaper for 90 days

## 2025-02-19 ENCOUNTER — TELEPHONE (OUTPATIENT)
Dept: CARDIAC SURGERY | Facility: CLINIC | Age: 75
End: 2025-02-19
Payer: MEDICARE

## 2025-02-19 RX ORDER — FAMOTIDINE 20 MG/1
20 TABLET, FILM COATED ORAL 2 TIMES DAILY
Qty: 60 TABLET | Refills: 0 | Status: SHIPPED | OUTPATIENT
Start: 2025-02-19

## 2025-02-21 ENCOUNTER — TELEPHONE (OUTPATIENT)
Dept: CARDIAC SURGERY | Facility: CLINIC | Age: 75
End: 2025-02-21
Payer: MEDICARE

## 2025-02-21 NOTE — TELEPHONE ENCOUNTER
Caller: Nirav Szymanski    Relationship: Self    Best call back number: [phone]    What is the best time to reach you: ANY    Who are you requesting to speak with (clinical staff, provider,  specific staff member): CLINICAL    Do you know the name of the person who called: PT    What was the call regarding: PT IS WANTING TO TALK TO A NURSE ABOUT HIS UPCOMING CT SCAN. PLEASE GIVE HIM A CALL TO DISCUSS. THANK YOU    Is it okay if the provider responds through [employer]: NO

## 2025-03-07 DIAGNOSIS — K21.9 GASTROESOPHAGEAL REFLUX DISEASE, UNSPECIFIED WHETHER ESOPHAGITIS PRESENT: ICD-10-CM

## 2025-03-07 RX ORDER — FAMOTIDINE 20 MG
20 TABLET ORAL 2 TIMES DAILY
Qty: 60 TABLET | Refills: 1 | Status: SHIPPED | OUTPATIENT
Start: 2025-03-07

## 2025-03-12 ENCOUNTER — HOSPITAL ENCOUNTER (OUTPATIENT)
Facility: HOSPITAL | Age: 75
Discharge: HOME | End: 2025-03-12
Admitting: NURSE PRACTITIONER
Payer: MEDICARE

## 2025-03-12 DIAGNOSIS — Z86.79 S/P ASCENDING AORTIC ANEURYSM REPAIR: ICD-10-CM

## 2025-03-12 DIAGNOSIS — Z98.890 S/P ASCENDING AORTIC ANEURYSM REPAIR: ICD-10-CM

## 2025-03-12 PROCEDURE — 71250 CT THORAX DX C-: CPT

## 2025-03-18 ENCOUNTER — OFFICE VISIT (OUTPATIENT)
Dept: CARDIAC SURGERY | Facility: CLINIC | Age: 75
End: 2025-03-18
Payer: MEDICARE

## 2025-03-18 VITALS
WEIGHT: 212 LBS | DIASTOLIC BLOOD PRESSURE: 72 MMHG | RESPIRATION RATE: 20 BRPM | SYSTOLIC BLOOD PRESSURE: 138 MMHG | OXYGEN SATURATION: 98 % | HEIGHT: 68 IN | TEMPERATURE: 97.5 F | BODY MASS INDEX: 32.13 KG/M2 | HEART RATE: 69 BPM

## 2025-03-18 DIAGNOSIS — Z98.890 S/P ASCENDING AORTIC ANEURYSM REPAIR: ICD-10-CM

## 2025-03-18 DIAGNOSIS — I71.21 ANEURYSM OF ASCENDING AORTA WITHOUT RUPTURE: Primary | ICD-10-CM

## 2025-03-18 DIAGNOSIS — Z86.79 S/P ASCENDING AORTIC ANEURYSM REPAIR: ICD-10-CM

## 2025-03-18 PROCEDURE — 99214 OFFICE O/P EST MOD 30 MIN: CPT | Performed by: NURSE PRACTITIONER

## 2025-03-18 PROCEDURE — 1159F MED LIST DOCD IN RCRD: CPT | Performed by: NURSE PRACTITIONER

## 2025-03-18 PROCEDURE — 1160F RVW MEDS BY RX/DR IN RCRD: CPT | Performed by: NURSE PRACTITIONER

## 2025-03-18 NOTE — PROGRESS NOTES
3/18/2025      Subjective:      Hai Bender MD    Chief Complaint: follow-up ascending aortic aneurysm--s/p repair    History of Present Illness:       Dear Hai Gonzales MD and Colleagues,    It was nice to see Nirav Szymanski in [facility] for follow-up. He is a 75 y.o. male with 5.3 ascending aortic aneurysm, proximal aortic arch dilatation, liver cyst, and hx of temporal arteritis.  His aortic aneurysm was discovered on workup for an abnormal EKG when he was living in [place]. He was initially seen by Dr. Akhtar in 2024. He subsequently underwent ascending aortic aneurysm repair with a 32 mm Terumo Gelweave Dacron interposition graft, proximal aortic arch replacement with the same graft and a hemiarch anastomosis configuration, left atrial appendage obliteration with a 40 mm atrial clip device, and comprehensive neuro monitoring by Dr. Akhtar on 2024.  He denies any change to his health history since last being seen except for his liver cyst is smaller.  He comes in today for routine follow-up for aneurysm surveillence.  The CT of chest without contrast on 3/12/2025 was reviewed.  The aortic root measures 4.2 cm, ascending aorta measures 3.9 cm, posterior arch 4 cm, and DTA measures up to 4.1 cm. These measurements are stable. Incidental findings of coronary artery calcifications and calcified granulomatous disease of left lung noted. These were d/w he and his wife. He denies shortness of breath, chest/back pain, numbness/tingling/pain of extremities. No vascular deficiencies or hyperextensible or hypermobile joints are noted on exam.  The patient's family history is positive for aneurysms. He reports his paternal grandfather  of thoracic aneurysm in the 1970s. Family history is negative for aortic dissections, negative for connective tissue disease, and negative for coronary disease in first degree family members. He reports he has started walking approximately 2 miles a day  with his neighbor again.  He reports mild dyspnea with their walks. His BP today is 138/72. He is with his wife for the appt today.        Patient Active Problem List   Diagnosis    Right flank pain    Vitamin D deficiency    Vasomotor rhinitis    Spinal stenosis of lumbar region    Liver cyst    History of temporal arteritis    GERD (gastroesophageal reflux disease)    DDD (degenerative disc disease), lumbar    Aneurysm of ascending aorta    Allergic rhinitis    Adenomatous polyp of colon    Hernia of abdominal wall    Abnormal findings on diagnostic imaging of other specified body structures    Ascending aortic aneurysm    S/P ascending aortic aneurysm repair    Paroxysmal atrial fibrillation       Past Medical History:   Diagnosis Date    Ascending aortic aneurysm     5.3 PER CARDIOLOGY NOTE    Colon polyps     GERD (gastroesophageal reflux disease)     History of prostate cancer 2003    PONV (postoperative nausea and vomiting)        Past Surgical History:   Procedure Laterality Date    ASCENDING AORTIC ANEURYSM REPAIR W/ MECHANICAL AORTIC VALVE REPLACEMENT N/A 2/28/2024    Procedure: CELESTINE, STERNOTOMY, THORACIC AORTIC ANEURYSM REPAIR, CIRCULATORY ARREST, NEURO MONITORING, PRP;  Surgeon: John Akhtar MD;  Location: [facility] CV[facility];  Service: Cardiothoracic;  Laterality: N/A;    CARDIAC CATHETERIZATION N/A 02/05/2024    Procedure: Left Heart Cath;  Surgeon: Suhail Bojorquez MD;  Location: [facility] CATH INVASIVE LOCATION;  Service: Cardiology;  Laterality: N/A;    CARDIAC CATHETERIZATION N/A 02/05/2024    Procedure: Coronary angiography;  Surgeon: Suhail Bojorquez MD;  Location: [facility] CATH INVASIVE LOCATION;  Service: Cardiology;  Laterality: N/A;    COLONOSCOPY      COLONOSCOPY N/A 02/20/2024    Procedure: COLONOSCOPY to cecum and into the terminal ileum with cold snare polypectomies;  Surgeon: Lexx Hill MD;  Location: [facility] ENDOSCOPY;  Service: Gastroenterology;  Laterality: N/A;  Pre: screening, hx  polyps  Post: external hemorrhoids, polyps, tattoo, diverticulosis    INGUINAL HERNIA REPAIR Right     NASAL POLYP EXCISION      PROSTATECTOMY  2004    ROTATOR CUFF REPAIR Left     2005    SINUS SURGERY      TESTICLE SURGERY      CHILDHOOD       Allergies   Allergen Reactions    Famotidine Swelling and Rash    Ranitidine Hcl Swelling         Current Outpatient Medications:     aspirin 81 MG EC tablet, Take 1 tablet by mouth Daily. HELD FOR OR  Indications: Disease involving Lipid Deposits in the Arteries, Disp: , Rfl:     B Complex Vitamins (VITAMIN-B COMPLEX PO), Take  by mouth Daily., Disp: , Rfl:     docusate sodium (COLACE) 250 MG capsule, Take 1 capsule by mouth Daily. Indications: Constipation, Disp: , Rfl:     fexofenadine (ALLEGRA) 180 MG tablet, Take 1 tablet by mouth Every Night. Indications: Hayfever, Disp: , Rfl:     Pepcid 20 MG tablet, TAKE 1 TABLET TWICE A DAY., Disp: 60 tablet, Rfl: 1    TURMERIC PO, Take  by mouth Daily., Disp: , Rfl:     VITAMIN D, CHOLECALCIFEROL, PO, Take  by mouth Daily., Disp: , Rfl:     ipratropium (ATROVENT) 0.03 % nasal spray, Administer 2 sprays into the nostril(s) as directed by provider As Needed for Rhinitis. Indications: Runny Nose (Patient not taking: Reported on 3/18/2025), Disp: , Rfl:     Magnesium 400 MG tablet, Take 400 mg by mouth Daily. HELD FOR OR  Indications: supplement  (Patient not taking: Reported on 3/18/2025), Disp: , Rfl:     Social History     Socioeconomic History    Marital status:    Tobacco Use    Smoking status: Never    Smokeless tobacco: Never   Vaping Use    Vaping status: Never Used   Substance and Sexual Activity    Alcohol use: Not Currently    Drug use: Never    Sexual activity: Defer       Family History   Problem Relation Age of Onset    Pancreatic cancer Mother     Aneurysm Maternal Grandfather     Malig Hyperthermia Neg Hx            Review of Systems:  Review of Systems   Respiratory:  Negative for shortness of breath.     Cardiovascular:  Negative for chest pain, palpitations and leg swelling.   Neurological:  Negative for dizziness and light-headedness.       Physical Exam:    Vital Signs:  Weight: 96.2 kg (212 lb)   Body mass index is 32.71 kg/m².  Temp: 97.5 °F (36.4 °C)   Heart Rate: 69   BP: 138/72     Physical Exam  Vitals and nursing note reviewed.   Constitutional:       General: He is awake.      Appearance: Normal appearance. He is well-developed and well-groomed.   HENT:      Head: Normocephalic and atraumatic.      Nose: Nose normal.      Mouth/Throat:      Lips: Pink.      Mouth: Mucous membranes are moist.      Pharynx: Uvula midline.   Eyes:      General: Lids are normal. No scleral icterus.     Extraocular Movements: Extraocular movements intact.      Conjunctiva/sclera: Conjunctivae normal.      Pupils: Pupils are equal, round, and reactive to light.      Comments: Wearing glasses   Neck:      Thyroid: No thyroid mass or thyromegaly.      Vascular: Normal carotid pulses. No carotid bruit, hepatojugular reflux or JVD.      Trachea: Trachea normal.   Cardiovascular:      Rate and Rhythm: Normal rate and regular rhythm.      Pulses:           Carotid pulses are 2+ on the right side and 2+ on the left side.       Radial pulses are 2+ on the right side and 2+ on the left side.        Femoral pulses are 2+ on the right side and 2+ on the left side.       Popliteal pulses are 2+ on the right side and 2+ on the left side.        Dorsalis pedis pulses are 2+ on the right side and 2+ on the left side.        Posterior tibial pulses are 2+ on the right side and 2+ on the left side.      Heart sounds: Normal heart sounds. No murmur heard.  Pulmonary:      Effort: Pulmonary effort is normal.      Breath sounds: Normal breath sounds.   Abdominal:      General: Abdomen is protuberant. Bowel sounds are normal. There is no distension.      Palpations: Abdomen is soft.      Tenderness: There is no abdominal tenderness.    Musculoskeletal:      Cervical back: Neck supple.      Right lower leg: No edema.      Left lower leg: No edema.      Comments: Gait steady and strong without use of assistive devices   Lymphadenopathy:      Cervical: No cervical adenopathy.      Upper Body:      Right upper body: No supraclavicular adenopathy.      Left upper body: No supraclavicular adenopathy.   Skin:     General: Skin is warm and dry.      Capillary Refill: Capillary refill takes less than 2 seconds.      Findings: No erythema or rash.      Nails: There is no clubbing.             Comments: Sternotomy well healed.   Neurological:      Mental Status: He is alert and oriented to person, place, and time.      GCS: GCS eye subscore is 4. GCS verbal subscore is 5. GCS motor subscore is 6.   Psychiatric:         Attention and Perception: Attention and perception normal.         Mood and Affect: Mood and affect normal.         Speech: Speech normal.         Behavior: Behavior normal. Behavior is cooperative.         Thought Content: Thought content normal.         Cognition and Memory: Cognition and memory normal.         Judgment: Judgment normal.          Assessment:     Ascending aortic aneurysm, 5.3 cm with proximal aortic arch dilatation--s/p ascending aortic aneurysm repair with a 32 mm Terumo Gelweave Dacron interposition graft, proximal aortic arch replacement with the same graft and a hemiarch anastomosis configuration, left atrial appendage obliteration with a 40 mm atrial clip device (Giovana, Feb 2024)--stable  DTA dilatation, 4.1 cm  Liver cyst, decreasing in size--followed by PCP/ GI  Family hx of thoracic aortic disease--paternal grandfather    Recommendation/Plan:       Continued risk factor modification of hypertension with a goal blood pressure less than 130/80, hyperlipidemia optimization, and continued avoidance of tobacco/nicotine products.    We discussed the importance of avoidance of over the counter decongestants and  stimulants, specifically pseudoephedrine, for hypertension and aneurysm management.    Initiation of low aerobic exercise is indicated to help reduce body habitus, assist with blood pressure and cholesterol control.       Although risk of rupture is low, emergency department presentation is warranted for acute chest, back, or abdominal pain, syncope, or stroke like symptoms.    Follow up in [facility] in 1 year(s) with CT scan of chest without contrast. Aortic measurements are stable without postrepair dilatation or pseudoaneurysm.  He and his wife are concerned about his descending thoracic aorta dilatation.  We discussed that surgical intervention would not be considered until he was over 6 cm. We discussed the natural history of aortic aneurysmal disease and general guidelines for surgical intervention. Encouragement given for continued exercise as well.    I spent approximately one minutes total in evaluating the data, examining the patient, discussing the options and counseling.  Independent interpretation of imaging.  Imaging shown to pt/wife.     Thank you for allowing us to participate in his care.    Regards,    Greta Florez, ADELINA      **All problems and history are new to this examiner.  Extensive review of records prior to and during patient visit

## 2025-04-10 ENCOUNTER — OFFICE VISIT (OUTPATIENT)
Dept: CARDIOLOGY | Age: 75
End: 2025-04-10
Payer: MEDICARE

## 2025-04-10 VITALS
HEART RATE: 72 BPM | BODY MASS INDEX: 33.27 KG/M2 | DIASTOLIC BLOOD PRESSURE: 68 MMHG | WEIGHT: 212 LBS | HEIGHT: 67 IN | SYSTOLIC BLOOD PRESSURE: 126 MMHG

## 2025-04-10 DIAGNOSIS — I71.21 ANEURYSM OF ASCENDING AORTA WITHOUT RUPTURE: Primary | ICD-10-CM

## 2025-04-10 DIAGNOSIS — Z98.890 S/P ASCENDING AORTIC ANEURYSM REPAIR: ICD-10-CM

## 2025-04-10 DIAGNOSIS — Z86.79 S/P ASCENDING AORTIC ANEURYSM REPAIR: ICD-10-CM

## 2025-04-10 DIAGNOSIS — I45.10 RIGHT BUNDLE BRANCH BLOCK: ICD-10-CM

## 2025-04-10 PROCEDURE — 1159F MED LIST DOCD IN RCRD: CPT | Performed by: NURSE PRACTITIONER

## 2025-04-10 PROCEDURE — 1160F RVW MEDS BY RX/DR IN RCRD: CPT | Performed by: NURSE PRACTITIONER

## 2025-04-10 PROCEDURE — 93000 ELECTROCARDIOGRAM COMPLETE: CPT | Performed by: NURSE PRACTITIONER

## 2025-04-10 PROCEDURE — 99213 OFFICE O/P EST LOW 20 MIN: CPT | Performed by: NURSE PRACTITIONER

## 2025-04-10 NOTE — PROGRESS NOTES
Date of Office Visit: 04/10/2025  Encounter Provider: ADELINA Shankar  Place of Service: [facility] CARDIOLOGY  Patient Name: Nirav Szymanski  :1950    Chief Complaint   Patient presents with    Atrial Fibrillation   :     HPI: Nirav Szymanski is a 75 y.o. male patient of Dr. Bojorquez's with a history of an ascending aortic aneurysm.  In 2024, he underwent ascending aortic aneurysm repair, proximal aortic arch replacement, and left atrial appendage obliteration.  Postoperatively he developed atrial fibrillation RVR.  He converted to sinus rhythm with amiodarone.  Anticoagulation was deferred.  Of note, his preoperative catheterization demonstrated normal coronaries.    He was last seen in the office by Dr. Bojorquez in 2024 at which time he was doing well.  His blood pressure and heart rates were low and Dr. Bojorquez discontinued the atenolol.  He was advised to follow-up in 1 year.    He has been doing well.  He denies any chest pain, palpitations, edema, dizziness, or syncope.  He has chronic dyspnea on exertion that is unchanged.  He walks 2 miles every day.  He was incidentally found to have a mild descending aortic aneurysm.  Surgery is aware and has recommended continued monitoring.    Past Medical History:   Diagnosis Date    Ascending aortic aneurysm     5.3 PER CARDIOLOGY NOTE    Colon polyps     GERD (gastroesophageal reflux disease)     History of prostate cancer     PONV (postoperative nausea and vomiting)        Past Surgical History:   Procedure Laterality Date    ASCENDING AORTIC ANEURYSM REPAIR W/ MECHANICAL AORTIC VALVE REPLACEMENT N/A 2024    Procedure: CELESTINE, STERNOTOMY, THORACIC AORTIC ANEURYSM REPAIR, CIRCULATORY ARREST, NEURO MONITORING, PRP;  Surgeon: John Akhtar MD;  Location: [facility];  Service: Cardiothoracic;  Laterality: N/A;    CARDIAC CATHETERIZATION N/A 2024    Procedure: Left Heart Cath;  Surgeon: Suhail Bojorquez  MD;  Location:  [facility] CATH INVASIVE LOCATION;  Service: Cardiology;  Laterality: N/A;    CARDIAC CATHETERIZATION N/A 02/05/2024    Procedure: Coronary angiography;  Surgeon: Suhail Bojorquez MD;  Location:  [facility] CATH INVASIVE LOCATION;  Service: Cardiology;  Laterality: N/A;    COLONOSCOPY      COLONOSCOPY N/A 02/20/2024    Procedure: COLONOSCOPY to cecum and into the terminal ileum with cold snare polypectomies;  Surgeon: Lexx Hill MD;  Location:  [facility] ENDOSCOPY;  Service: Gastroenterology;  Laterality: N/A;  Pre: screening, hx polyps  Post: external hemorrhoids, polyps, tattoo, diverticulosis    INGUINAL HERNIA REPAIR Right     NASAL POLYP EXCISION      PROSTATECTOMY  2004    ROTATOR CUFF REPAIR Left     2005    SINUS SURGERY      TESTICLE SURGERY      CHILDHOOD       Social History     Socioeconomic History    Marital status:    Tobacco Use    Smoking status: Never    Smokeless tobacco: Never   Vaping Use    Vaping status: Never Used   Substance and Sexual Activity    Alcohol use: Not Currently    Drug use: Never    Sexual activity: Defer       Family History   Problem Relation Age of Onset    Pancreatic cancer Mother     Aneurysm Maternal Grandfather     Malig Hyperthermia Neg Hx        Review of Systems   Constitutional: Negative.   Cardiovascular:  Positive for dyspnea on exertion. Negative for chest pain, leg swelling, orthopnea, paroxysmal nocturnal dyspnea and syncope.   Respiratory: Negative.     Hematologic/Lymphatic: Negative for bleeding problem.   Musculoskeletal:  Negative for falls.   Gastrointestinal:  Negative for melena.   Neurological:  Negative for dizziness and light-headedness.       Allergies   Allergen Reactions    Famotidine Swelling and Rash    Ranitidine Hcl Swelling         Current Outpatient Medications:     aspirin 81 MG EC tablet, Take 1 tablet by mouth Daily. HELD FOR OR  Indications: Disease involving Lipid Deposits in the Arteries, Disp: , Rfl:     B Complex  "Vitamins (VITAMIN-B COMPLEX PO), Take  by mouth Daily., Disp: , Rfl:     docusate sodium (COLACE) 250 MG capsule, Take 1 capsule by mouth Daily. Indications: Constipation, Disp: , Rfl:     fexofenadine (ALLEGRA) 180 MG tablet, Take 1 tablet by mouth Every Night. Indications: Hayfever, Disp: , Rfl:     ipratropium (ATROVENT) 0.03 % nasal spray, Administer 2 sprays into the nostril(s) as directed by provider As Needed for Rhinitis., Disp: , Rfl:     Pepcid 20 MG tablet, TAKE 1 TABLET TWICE A DAY., Disp: 60 tablet, Rfl: 1    TURMERIC PO, Take  by mouth Daily., Disp: , Rfl:     VITAMIN D, CHOLECALCIFEROL, PO, Take  by mouth Daily., Disp: , Rfl:       Objective:     Vitals:    04/10/25 1330   BP: 126/68   Pulse: 72   Weight: 96.2 kg (212 lb)   Height: 170.2 cm (67\")     Body mass index is 33.2 kg/m².    PHYSICAL EXAM:    Neck:      Vascular: No JVD.   Pulmonary:      Effort: Pulmonary effort is normal.      Breath sounds: Normal breath sounds.   Cardiovascular:      Normal rate. Regular rhythm.      Murmurs: There is no murmur.      No gallop.  No click. No rub.   Pulses:     Intact distal pulses.           ECG 12 Lead    Date/Time: 4/10/2025 1:35 PM  Performed by: Migdalia Grijalva APRN    Authorized by: Migdalia Grijalva APRN  Comparison: compared with previous ECG from 4/9/2024  Similar to previous ECG  Rhythm: sinus rhythm  Rate: normal  BPM: 72  Conduction: right bundle branch block            Assessment:       Diagnosis Plan   1. Aneurysm of ascending aorta without rupture  ECG 12 Lead      2. S/P ascending aortic aneurysm repair        3. Right bundle branch block          Orders Placed This Encounter   Procedures    ECG 12 Lead     This order was created via procedure documentation     Release to patient:   Routine Release [[id number]]          Plan:       1.  Ascending aortic aneurysm.  Status post repair.  Also has a descending thoracic aneurysm for which monitoring has been recommended.  He follows with " CT surgery.      2.  Right bundle branch block.  This is stable and unchanged.      I think he is doing well.  I am not making any changes, and he will follow-up with Dr. Bojorquez in 1 year.      As always, it has been a pleasure to participate in your patient's care.      Sincerely,         ADELINA Mendoza

## 2025-04-25 ENCOUNTER — TELEPHONE (OUTPATIENT)
Dept: FAMILY MEDICINE CLINIC | Facility: CLINIC | Age: 75
End: 2025-04-25
Payer: MEDICARE

## 2025-04-25 DIAGNOSIS — K21.9 GASTROESOPHAGEAL REFLUX DISEASE, UNSPECIFIED WHETHER ESOPHAGITIS PRESENT: ICD-10-CM

## 2025-04-25 RX ORDER — FAMOTIDINE 20 MG/1
20 TABLET, FILM COATED ORAL 2 TIMES DAILY
Qty: 180 TABLET | Refills: 1 | Status: SHIPPED | OUTPATIENT
Start: 2025-04-25

## 2025-04-25 NOTE — TELEPHONE ENCOUNTER
The following pt came into the office in regards of the pepcid prescription  20mg, the patient states that they typically a monthly prescription refill , but the patient is requesting that this prescription be changed to a 90 day for insurance purposes, can you please assist with this.

## 2025-05-13 DIAGNOSIS — K21.9 GASTROESOPHAGEAL REFLUX DISEASE, UNSPECIFIED WHETHER ESOPHAGITIS PRESENT: ICD-10-CM

## 2025-05-13 NOTE — TELEPHONE ENCOUNTER
Caller: [facility]  [facility]IE Moralez - [address] to [facility] - [phone]  - [phone] FX    Relationship: Pharmacy    Best call back number: [phone]    Requested Prescriptions:   Requested Prescriptions     Pending Prescriptions Disp Refills    famotidine (Pepcid) 20 MG tablet 180 tablet 1     Sig: Take 1 tablet by mouth 2 (Two) Times a Day.        Pharmacy where request should be sent: [facility] - [address] [facility] AT [address] TO [facility] - [phone]  - [phone] FX     Last office visit with prescribing clinician: 10/11/2024   Last telemedicine visit with prescribing clinician: Visit date not found   Next office visit with prescribing clinician: Visit date not found     Additional details provided by patient: PATIENT NEEDS REFILL     [facility] REF# [id number]    PHONE NUMBER  OPTION 2         Would you like a call back once the refill request has been completed: [] Yes [x] No    If the office needs to give you a call back, can they leave a voicemail: [] Yes [x] No    Shad Bullock Rep   05/13/25 11:28 EDT

## 2025-05-14 RX ORDER — FAMOTIDINE 20 MG/1
20 TABLET, FILM COATED ORAL 2 TIMES DAILY
Qty: 180 TABLET | Refills: 1 | Status: SHIPPED | OUTPATIENT
Start: 2025-05-14 | End: 2025-05-15 | Stop reason: SDUPTHER

## 2025-05-15 ENCOUNTER — OFFICE VISIT (OUTPATIENT)
Dept: FAMILY MEDICINE CLINIC | Facility: CLINIC | Age: 75
End: 2025-05-15
Payer: MEDICARE

## 2025-05-15 VITALS
RESPIRATION RATE: 16 BRPM | HEIGHT: 67 IN | DIASTOLIC BLOOD PRESSURE: 72 MMHG | BODY MASS INDEX: 32.65 KG/M2 | HEART RATE: 69 BPM | OXYGEN SATURATION: 96 % | WEIGHT: 208 LBS | SYSTOLIC BLOOD PRESSURE: 130 MMHG

## 2025-05-15 DIAGNOSIS — K21.9 GASTROESOPHAGEAL REFLUX DISEASE, UNSPECIFIED WHETHER ESOPHAGITIS PRESENT: ICD-10-CM

## 2025-05-15 PROCEDURE — 99213 OFFICE O/P EST LOW 20 MIN: CPT | Performed by: INTERNAL MEDICINE

## 2025-05-15 PROCEDURE — 1126F AMNT PAIN NOTED NONE PRSNT: CPT | Performed by: INTERNAL MEDICINE

## 2025-05-15 RX ORDER — FAMOTIDINE 20 MG
20 TABLET ORAL 2 TIMES DAILY
Qty: 180 TABLET | Refills: 1 | Status: SHIPPED | OUTPATIENT
Start: 2025-05-15

## 2025-05-15 NOTE — PROGRESS NOTES
Subjective complaint is to discuss his heartburn medication  Nirav Szymanski is a 75 y.o. male.     Heartburn  He complains of heartburn.  Aram is here today to discuss his heartburn medication.  He reports that recently his Pepcid was filled as generic famotidine.  The patient reports he has had an allergy in the past with facial swelling to the generic famotidine but not brand-name Pepcid.  He has been taking brand-name Pepcid for 30 years.  He does note that if he misses a few days that he has worsening indigestion and reflux.    The following portions of the patient's history were reviewed and updated as appropriate: He  has a past medical history of Ascending aortic aneurysm, Colon polyps, GERD (gastroesophageal reflux disease), History of prostate cancer (2003), and PONV (postoperative nausea and vomiting).  He does not have any pertinent problems on file.  Current Outpatient Medications   Medication Sig Dispense Refill    aspirin 81 MG EC tablet Take 1 tablet by mouth Daily. HELD FOR OR  Indications: Disease involving Lipid Deposits in the Arteries      B Complex Vitamins (VITAMIN-B COMPLEX PO) Take  by mouth Daily.      docusate sodium (COLACE) 250 MG capsule Take 1 capsule by mouth Daily. Indications: Constipation      fexofenadine (ALLEGRA) 180 MG tablet Take 1 tablet by mouth Every Night. Indications: Hayfever      Pepcid 20 MG tablet Take 1 tablet by mouth 2 (Two) Times a Day. 180 tablet 1    TURMERIC PO Take  by mouth Daily.      VITAMIN D, CHOLECALCIFEROL, PO Take  by mouth Daily.      ipratropium (ATROVENT) 0.03 % nasal spray Administer 2 sprays into the nostril(s) as directed by provider As Needed for Rhinitis. (Patient not taking: Reported on 5/15/2025)       No current facility-administered medications for this visit.     He is allergic to famotidine and ranitidine hcl..    Review of Systems   Cardiovascular:         He recently saw his thoracic surgeon.  His CT of the chest showed stability and  actually may be improvement in his descending thoracic aorta diameter.   Gastrointestinal:  Positive for heartburn.     Objective   Physical Exam  Constitutional:       Appearance: Normal appearance.   Neck:      Vascular: No carotid bruit.   Cardiovascular:      Rate and Rhythm: Normal rate and regular rhythm.   Pulmonary:      Effort: Pulmonary effort is normal.      Breath sounds: No wheezing, rhonchi or rales.   Abdominal:      General: Bowel sounds are normal.      Palpations: Abdomen is soft.      Tenderness: There is no abdominal tenderness. There is no guarding or rebound.   Neurological:      Mental Status: He is alert.     Assessment & Plan   Diagnoses and all orders for this visit:    1. Gastroesophageal reflux disease, unspecified whether esophagitis present  -     Pepcid 20 MG tablet; Take 1 tablet by mouth 2 (Two) Times a Day.  Dispense: 180 tablet; Refill: 1    Nirav is here today for discussion regarding his reflux medication.  He does report that he has tolerated brand-name Pepcid for 30+ years but did have a reaction to the generic Pepcid.  I have made a notation in his allergies to address that.  I have changed the prescription in our system to brand-name only

## (undated) DEVICE — DRSNG SURESITE WNDW 4X4.5

## (undated) DEVICE — 28 FR RIGHT ANGLE – SOFT PVC CATHETER: Brand: PVC THORACIC CATHETERS

## (undated) DEVICE — SYS PERFUS SEP PLATLT W TIPS CUST

## (undated) DEVICE — SOL ISO/ALC 70PCT 4OZ

## (undated) DEVICE — SUT SILK 2 SUTUPAK TIE 60IN SA8H 2STRAND

## (undated) DEVICE — CANN RETRGR STYLET RSCP 15F

## (undated) DEVICE — Device

## (undated) DEVICE — ST. SORBAVIEW ULTIMATE IJ SYSTEM A,C: Brand: CENTURION

## (undated) DEVICE — CANN O2 ETCO2 FITS ALL CONN CO2 SMPL A/ 7IN DISP LF

## (undated) DEVICE — ADAPT ANTEGRADE RETRGR

## (undated) DEVICE — SUT PROLN 4/0 V5 36IN 8935H

## (undated) DEVICE — SUT SILK 0 CT1 CR8 18IN C021D

## (undated) DEVICE — DRP SLUSH WARMR MACH RECTG 66X44IN

## (undated) DEVICE — GLV SURG BIOGEL LTX PF 7 1/2

## (undated) DEVICE — GW EMR FIX EXCHG J STD .035 3MM 260CM

## (undated) DEVICE — CANN VESL CORNRY STR W/4MM BALN

## (undated) DEVICE — ACUSNARE POLYPECTOMY SNARE: Brand: ACUSNARE

## (undated) DEVICE — DRSNG SURESITE WNDW 2.38X2.75

## (undated) DEVICE — CATH DIAG IMPULSE FR4 5F 100CM

## (undated) DEVICE — KT ORCA ORCAPOD DISP STRL

## (undated) DEVICE — BIOPATCH™ ANTIMICROBIAL DRESSING WITH CHLORHEXIDINE GLUCONATE IS A HYDROPHILLIC POLYURETHANE ABSORPTIVE FOAM WITH CHLORHEXIDINE GLUCONATE (CHG) WHICH INHIBITS BACTERIAL GROWTH UNDER THE DRESSING. THE DRESSING IS INTENDED TO BE USED TO ABSORB EXUDATE, COVER A WOUND CAUSED BY VASCULAR AND NONVASCULAR PERCUTANEOUS MEDICAL DEVICES DURING SURGERY, AS WELL AS REDUCE LOCAL INFECTION AND COLONIZATION OF MICROORGANISMS.: Brand: BIOPATCH

## (undated) DEVICE — SUT SILK 2/0 SH CR8 18IN CR8 C012D

## (undated) DEVICE — PK PERFUS W/TB CUST ADLT

## (undated) DEVICE — HEMOCONCENTRATOR PERFUS LPS06

## (undated) DEVICE — LINER SUCT CANSTR 1500CC SEMI RIG W/ POR HYDROPHOBIC SHUT

## (undated) DEVICE — TUBING, SUCTION, 3/16" X 6', STRAIGHT: Brand: MEDLINE

## (undated) DEVICE — SUT PROLN 3/0 V7 D/A 36IN 8976H

## (undated) DEVICE — SUT PROLN 6/0 RB2 D/A 30IN 8711H

## (undated) DEVICE — CLAMP INSERT: Brand: STEALTH® CLAMP INSERT

## (undated) DEVICE — ORGANIZER SUT SHELIGH 3T 213013

## (undated) DEVICE — LINE SAMP O2 6.5FT W/FEMALE CONN F/ADULT CAPNOLINE PLUS

## (undated) DEVICE — Z INACTIVE CONVERTED USE 2418596 CONTAINER SPEC 40ML 10% NEUT BUFF FRMLN CLR POLYPR PREFIL

## (undated) DEVICE — PK ATS CUST W CARDIOTOMY RESEVOIR

## (undated) DEVICE — PK HEART OPN 40

## (undated) DEVICE — KENDALL 500 SERIES DIAPHORETIC FOAM MONITORING ELECTRODE - TEAR DROP SHAPE ( 30/PK): Brand: KENDALL

## (undated) DEVICE — JELLY LUBRICATING 3 GM BACTERIOSTATIC

## (undated) DEVICE — BND COMPR/HEMO RADL VASCBAND REG 24CM 1P/U

## (undated) DEVICE — SUT SILK 2/0 TIES 18IN A185H

## (undated) DEVICE — INSTINCT ENDOSCOPIC HEMOCLIP: Brand: INSTINCT

## (undated) DEVICE — TRAP POLYP ETRAP 2PK

## (undated) DEVICE — NEEDLE SCLERO 23GA L240CM OD064MM ID032MM CLR INTERJECT

## (undated) DEVICE — SNAR POLYP CAPTIVATOR RND STFF 2.4 240CM 10MM 1P/U

## (undated) DEVICE — PK CATH CARD 40

## (undated) DEVICE — PREP SOL POVIDONE/IODINE BT 4OZ

## (undated) DEVICE — 28 FR STRAIGHT – SOFT PVC CATHETER: Brand: PVC THORACIC CATHETERS

## (undated) DEVICE — MARKER,SKIN,WI/RULER AND LABELS: Brand: MEDLINE

## (undated) DEVICE — SPNG GZ WOVN 4X4IN 12PLY 10/BX STRL

## (undated) DEVICE — LABEL SHEET CUSTOM 2X2 YELLOW: Brand: MEDLINE INDUSTRIES, INC.

## (undated) DEVICE — LN SMPL CO2 SHTRM SD STREAM W/M LUER

## (undated) DEVICE — ST TOURNI COMPL A/ 7IN

## (undated) DEVICE — LP VESL MAXI 2.5X1MM RED 2PK

## (undated) DEVICE — SENSR O2 OXIMAX FNGR A/ 18IN NONSTR

## (undated) DEVICE — OPTIFOAM GENTLE SA, POSTOP, 4X12: Brand: MEDLINE

## (undated) DEVICE — SPONGE,DISSECTOR,K,XRAY,9/16"X1/4",STRL: Brand: MEDLINE

## (undated) DEVICE — ADAPT CLN BIOGUARD AIR/H2O DISP

## (undated) DEVICE — CATH DIAG IMPULSE FL4 5F 100CM

## (undated) DEVICE — DRSNG WND GZ PAD BORDERED 4X8IN STRL

## (undated) DEVICE — SENSR CERBRL O2 PK/2

## (undated) DEVICE — THE TORRENT IRRIGATION SCOPE CONNECTOR IS USED WITH THE TORRENT IRRIGATION TUBING TO PROVIDE IRRIGATION FLUIDS SUCH AS STERILE WATER DURING GASTROINTESTINAL ENDOSCOPIC PROCEDURES WHEN USED IN CONJUNCTION WITH AN IRRIGATION PUMP (OR ELECTROSURGICAL UNIT).: Brand: TORRENT

## (undated) DEVICE — SUT PROLN 4/0 BB D/A 36IN 8581H

## (undated) DEVICE — SUT PROLN 5/0 V5 36IN 8934H

## (undated) DEVICE — TBG SXN CONN/F UNIV 1/4IN 10FT LF STRL

## (undated) DEVICE — DECANTER BAG 9": Brand: MEDLINE INDUSTRIES, INC.

## (undated) DEVICE — BG TRANSF W/COUPLER SPK 600ML

## (undated) DEVICE — PK PERFUS CUST W/CARDIOPLEGIA

## (undated) DEVICE — CONTAINER,SPECIMEN,OR STERILE,4OZ: Brand: MEDLINE

## (undated) DEVICE — INTRO GLIDESHEATH .021 5F100MM

## (undated) DEVICE — BW-412T DISP COMBO CLEANING BRUSH: Brand: SINGLE USE COMBINATION CLEANING BRUSH

## (undated) DEVICE — CANN ART DLP 1PC EDPA A/ 20F

## (undated) DEVICE — GUIDE SELECT ATRICLIP

## (undated) DEVICE — KT MANIFLD CARDIAC

## (undated) DEVICE — 8 FOOT DISPOSABLE EXTENSION CABLE WITH SAFE CONNECT / ALLIGATOR CLIP

## (undated) DEVICE — TBG ART PRESS 60 IN

## (undated) DEVICE — DRN WND CH RND FUL/FLUT NO/TROC 3/8IN 28F

## (undated) DEVICE — CANN AORT ROOT DLP VNT/8IN 14G 7F